# Patient Record
Sex: FEMALE | Race: WHITE | NOT HISPANIC OR LATINO | Employment: OTHER | URBAN - METROPOLITAN AREA
[De-identification: names, ages, dates, MRNs, and addresses within clinical notes are randomized per-mention and may not be internally consistent; named-entity substitution may affect disease eponyms.]

---

## 2017-03-23 ENCOUNTER — ALLSCRIPTS OFFICE VISIT (OUTPATIENT)
Dept: OTHER | Facility: OTHER | Age: 82
End: 2017-03-23

## 2017-04-04 ENCOUNTER — GENERIC CONVERSION - ENCOUNTER (OUTPATIENT)
Dept: OTHER | Facility: OTHER | Age: 82
End: 2017-04-04

## 2017-05-18 ENCOUNTER — ALLSCRIPTS OFFICE VISIT (OUTPATIENT)
Dept: OTHER | Facility: OTHER | Age: 82
End: 2017-05-18

## 2017-05-18 DIAGNOSIS — I50.32 CHRONIC DIASTOLIC HEART FAILURE (HCC): ICD-10-CM

## 2017-06-26 ENCOUNTER — ALLSCRIPTS OFFICE VISIT (OUTPATIENT)
Dept: OTHER | Facility: OTHER | Age: 82
End: 2017-06-26

## 2017-10-17 ENCOUNTER — GENERIC CONVERSION - ENCOUNTER (OUTPATIENT)
Dept: OTHER | Facility: OTHER | Age: 82
End: 2017-10-17

## 2017-11-13 ENCOUNTER — ALLSCRIPTS OFFICE VISIT (OUTPATIENT)
Dept: OTHER | Facility: OTHER | Age: 82
End: 2017-11-13

## 2017-11-15 NOTE — PROGRESS NOTES
Assessment  Assessed    1  Permanent atrial fibrillation (427 31) (I48 2)   2  Pacemaker Permanent Placement Dual-Chamber    Plan  Pacemaker Permanent Placement Dual-Chamber    · Follow-up visit in 6 months Evaluation and Treatment  Follow-up  Status: Complete Done: [id number]   Ordered; For: Pacemaker Permanent Placement Dual-Chamber; Ordered By: Igor Aleman Performed:  Order Comments: WAIT LIST Due: [id number]; Last Updated By: Faye Rosa; 11/13/2017 10:40:26 AM  Permanent atrial fibrillation    · EKG/ECG- POC; Status:Complete;   Done: 37XVQ5389 10:23AM   Perform: In Office; Last Updated Pepe Zarate; 11/13/2017 10:23:42 AM;Ordered;atrial fibrillation; Ordered By:Marielle Garcia; Discussion/Summary  Cardiology Discussion Summary Free Text Note Form [facility]:   1  Paroxysmal Atrial FIbrillation: She is in controlled atrial fibrillation  Tolerating anticoagulation  Check labs  s/p PPM: Stable  Continue regular interrogations  Edema/Chronic Diastolic CHF: On lasix 24JQ Daily  Her cr  is stable 1 8 - 2  Volume status is stable  Counseling Documentation With Imm: The patient was counseled regarding diagnostic results,-- instructions for management,-- risk factor reductions,-- impressions  total time of encounter was 25 minutes-- and-- 15 minutes was spent counseling  Chief Complaint  Chief Complaint Free Text Note Form: Six month follow up with EKG  Patient states she had sharp pain in side of chest under left breast a few days ago and it lasting a approx  3-4 mins  No symptoms since  History of Present Illness  Cardiology HPI Free Text Note Form [facility]: Followup for AF  episode of left sided chest discomfort that lasted for a few minutes  No recurrences  Otherwise doing okay  Atrial Fibrillation (Follow-Up): The patient presents with permanent atrial fibrillation  The treatment strategy for this patient is rate control  She states her atrial fibrillation has been stable since the last visit  Symptoms: denies palpitations,-- denies chest pain,-- stable exercise intolerance,-- stable dyspnea on exertion-- and-- denies dizziness  Associated symptoms include no syncope,-- no focal neurologic deficit,-- no tendency for easy bleeding-- and-- no tendency for easy bruising  Monitoring: The patient has regular PT/INR checks  Medications: the patient is adherent with her medication regimen  -- She denies medication side effects  Review of Systems  Cardiology Female ROS:    Cardiac: rhythm problems  Skin: No complaints of nonhealing sores or skin rash  Genitourinary: No complaints of recurrent urinary tract infections, frequent urination at night, difficult urination, blood in urine, kidney stones, loss of bladder control, kidney problems, denies any birth control or hormone replacement, is not post menopausal, not currently pregnant  Psychological: No complaints of feeling depressed, anxiety, panic attacks, or difficulty concentrating  General: No complaints of trouble sleeping, lack of energy, fatigue, appetite changes, weight changes, fever, frequent infections, or night sweats  Respiratory: shortness of breath  HEENT: No complaints of serious problems, hearing problems, nose problems, throat problems, or snoring  Gastrointestinal: No complaints of liver problems, nausea, vomiting, heartburn, constipation, bloody stools, diarrhea, problems swallowing, adbominal pain, or rectal bleeding  Hematologic: No complaints of bleeding disorders, anemia, blood clots, or excessive brusing  Neurological: No complaints of numbness, tingling, dizziness, weakness, seizures, headaches, syncope or fainting, AM fatigue, daytime sleepiness, no witnessed apnea episodes  Musculoskeletal: No complaints of arthritis, back pain, or painfull swelling  ROS Reviewed:   ROS reviewed  Active Problems  Problems    1  Abdominal pain (789 00) (R10 9)   2  Acute myocardial infarction (410 90) (I21 9)   3   Anemia (285 9) (D64 9)   4  Atypical chest pain (786 59) (R07 89)   5  AV block (426 10) (I44 30)   6  Chronic diastolic congestive heart failure (428 32,428 0) (I50 32)   7  Coronary artery disease (414 00) (I25 10)   8  Gait disturbance (781 2) (R26 9)   9  Hyperlipidemia (272 4) (E78 5)   10  Hypertension (401 9) (I10)   11  Macrocytosis (289 89) (D75 89)   12  Muscle weakness (728 87) (M62 81)   13  Osteoarthritis (715 90) (M19 90)   14  Osteoporosis (733 00) (M81 0)   15  Pacemaker Permanent Placement Dual-Chamber   16  Pericardial effusion (423 9) (I31 3)   17  Permanent atrial fibrillation (427 31) (I48 2)   18  Supraventricular tachycardia (427 89) (I47 1)   19  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)   20  Vitamin B12 deficiency (266 2) (E53 8)    Past Medical History  Problems    1  History of Choledocholithiasis (574 50) (K80 50)   2  History of Cholelithiasis with acute cholecystitis (574 00) (K80 00)   3  History of Contusion of skin with intact surface (924 9) (T14 8XXA)   4  History of nausea and vomiting (V12 79) (Z87 898)   5  History of vaginitis (V13 29) (Z87 42)   6  History of Open Wound Of The Lower Extremity (894 0)   7  History of Skin Cancer (V10 83)   8  History of Subacromial or subdeltoid bursitis, unspecified laterality   9  History of Umbilical hernia (910 4) (K42 9)  Active Problems And Past Medical History Reviewed: The active problems and past medical history were reviewed and updated today  Surgical History  Problems    1  History of Cataract Surgery   2  History of Cholecystectomy Laparoscopic   3  History of Creation Of Pericardial Window   4  History of Mohs Micrographic Surgery Nose   5  Pacemaker Permanent Placement Dual-Chamber   6  History of Pacemaker Placement   7  History of Umbilical Hernia Repair  Surgical History Reviewed: The surgical history was reviewed and updated today  Family History  Family History Reviewed: The family history was reviewed and updated today  Social History  Problems    · Being A Social Drinker   · Exercising Regularly   · Former smoker (O87 32) (U40 966)   · Marital History -    · Occupation: Retired  Social History Reviewed: The social history was reviewed and updated today  Current Meds   1  Acetaminophen 325 MG Oral Tablet; Therapy: (Recorded:18May2017) to Recorded   2  Artificial Tears 0 1-0 3 % Ophthalmic Solution; Therapy: (Recorded:18May2017) to Recorded   3  Calazime Skin Protectant External Paste; Therapy: (Recorded:18May2017) to Recorded   4  Calcium TABS; Therapy: (Recorded:02Jan2014) to Recorded   5  CeraVe CREA; Therapy: (Recorded:18May2017) to Recorded   6  Furosemide 80 MG Oral Tablet; TAKE 1 TABLET DAILY AS DIRECTED; Therapy: (Recorded:27May2014) to  Requested for: 49CMG1670 Recorded   7  Loperamide HCl - 2 MG Oral Capsule; Therapy: (Recorded:18May2017) to Recorded   8  Metoprolol Succinate ER 50 MG Oral Tablet Extended Release 24 Hour; TAKE 1 TABLET DAILY; Therapy: 80CMN2064 to (Kimberly Pemberton)  Requested for: 94UOZ4241 Recorded   9  Pantoprazole Sodium 40 MG Oral Tablet Delayed Release; TAKE 1 TABLET DAILY; Therapy: 03UUJ3513 to (Evaluate:28Mar2016) Recorded   10  Potassium Chloride Jade ER 20 MEQ Oral Tablet Extended Release; TAKE 1 TABLET  EVERY 12 HOURS; Therapy: 31DFK1241 to (Evaluate:23Dec2014)  Requested for: 66TYL9520 Recorded   11  Saline Nasal Mist SOLN;  Therapy: (Recorded:18May2017) to Recorded   12  Triple Antibiotic OINT; Therapy: (Recorded:18May2017) to Recorded   13  Vitamin B-12 500 MCG Oral Tablet; give 1 tab every 2 days; Therapy: 57VPO6342 to Recorded   14  Vitamin D TABS; Therapy: (Recorded:02Jan2014) to Recorded   15  Warfarin Sodium 2 5 MG Oral Tablet; TAKE AS DIRECTED; Therapy: 27EQY8446 to (Evaluate:64Lfg4902) Recorded  Medication List Reviewed: The medication list was reviewed and updated today  Allergies  Medication    1  Hycodan SYRP   2   Demerol SOLN    Vitals  Vital Signs Recorded: 82AHD8562 10:12AM   Heart Rate 59   Systolic 992   Diastolic 52   Height 5 ft 4 in   Weight 134 lb    BMI Calculated 23   BSA Calculated 1 65       Physical Exam   Constitutional  General appearance: No acute distress, well appearing and well nourished  Eyes  Conjunctiva and Sclera examination: Conjunctiva pink, sclera anicteric  Ears, Nose, Mouth, and Throat - Oropharynx: Clear, nares are clear, mucous membranes are moist   Neck  Neck and thyroid: Normal, supple, trachea midline, no thyromegaly  Pulmonary  Respiratory effort: No increased work of breathing or signs of respiratory distress  Auscultation of lungs: Clear to auscultation, no rales, no rhonchi, no wheezing, good air movement  Cardiovascular  Auscultation of heart: Normal rate and rhythm, normal S1 and S2, no murmurs  Carotid pulses: Normal, 2+ bilaterally  Peripheral vascular exam: Normal pulses throughout, no tenderness, erythema or swelling  Pedal pulses: Normal, 2+ bilaterally  Examination of extremities for edema and/or varicosities: Normal    Abdomen  Abdomen: Non-tender and no distention  Liver and spleen: No hepatomegaly or splenomegaly  Musculoskeletal Gait and station: Normal gait  -- Digits and nails: Normal without clubbing or cyanosis  -- Inspection/palpation of joints, bones, and muscles: Normal, ROM normal    Skin - Skin and subcutaneous tissue: Normal without rashes or lesions  Skin is warm and well perfused, normal turgor  Neurologic - Cranial nerves: II - XII intact  -- Speech: Normal    Psychiatric - Orientation to person, place, and time: Normal -- Mood and affect: Normal       Results/Data  ECG Report: Atrial Fibrillation with Ventricular Pacing        Future Appointments    Date/Time Provider Specialty Site   11/29/2017 10:00 AM Cardiology, Device Remote  [address]       Signatures   Electronically signed by : JONY Dyer ; Nov 14 2017  9:32AM EST (Author)

## 2017-11-29 ENCOUNTER — ALLSCRIPTS OFFICE VISIT (OUTPATIENT)
Dept: OTHER | Facility: OTHER | Age: 82
End: 2017-11-29

## 2018-01-02 ENCOUNTER — GENERIC CONVERSION - ENCOUNTER (OUTPATIENT)
Dept: OTHER | Facility: OTHER | Age: 83
End: 2018-01-02

## 2018-01-02 LAB — INR PPP: 2.7 (ref 0.86–1.16)

## 2018-01-11 NOTE — PROGRESS NOTES
REPORT NAME: Patient Visit Summary Report   VISIT DATE: 9/8/2016  VISIT TIME: 11:49 AM EDT  PATIENT NAME: Sulma Villalobos RECORD NUMBER: [id number]  SOCIAL SECURITY NUMBER:   YOB: 1923  AGE: 80  REFERRING PHYSICIAN: Jessica Hendrickson  SUPERVISING CLINICIAN: [address] CARE PROFESSIONAL: Ana Fabian  PATIENT HOME ADDRESS: [address] [place] [address] [address] [facility], [place] [address] PHONE: [phone]  DIAGNOSIS 1: Unspecified atrial fibrillation / I48 91  DIAGNOSIS 2:   DIAGNOSIS 3:   DIAGNOSIS 4:   INR RANGE: 2 - 3  INR GOAL: 2 5  TREATMENT START DATE:   TREATMENT END DATE:   NEXT VISIT: 9/22/2016  [facility]    VISIT RESULTS   ENCOUNTER NUMBER:   TEST LOCATION: Other - see notes  TEST TYPE: Outside Lab (Venipuncture)  VISIT TYPE: Phone Consult  CURRENT INR: 2 2 PROTIME: 24 5  SPECIMEN COL AND RPT DATE: 9/8/2016 11:49  AM EDT    VITAL SIGNS  PULSE:  B/P:  WEIGHT:  HEIGHT:  TEMP:     CURRENT ANTICOAGULANT DOSING SCHEDULE  DOSE SIZE: 5mg    ANTICOAGULANT TYPE: WARFARIN  DOSING REGIMEN  Sun       Mon       Tues      Wed       Thurs     Fri       Sat  Total/Wk  2 5       2 5       5         2 5       5         2 5       5         25    PATIENT MEDICATION INSTRUCTION: Yes  PATIENT NUTRITIONAL COUNSELING: No  PATIENT BRUISING INSTRUCTION: No      LAST EDUCATION DATE:       PREVIOUS VISIT INFORMATION  VISITDATE   INR Goal  INR   Sun     Mon     Tues    Wed     Thurs   Fri  Sat     Total/wk  9/8/2016    2 5       2 2   2 5     2 5     5       2 5     5       2 5  5       25  9/1/2016    2 5       2 2   2 5     2 5     5       2 5     5       2 5  5       25  8/24/2016   2 5       3 3   2 5     2 5     5       HOLD    5       2 5  5       22 5  8/10/2016   2 5       1 7   5       2 5     5       2 5     5       2 5  5       27 5    ADDITIONAL PREVIOUS VISIT INFORMATION  VISITDATE   PRIMARY RX               DOSE      CrCl  9/8/2016    WARFARIN                 5mg 9/1/2016    WARFARIN                 5mg                 8/24/2016   WARFARIN                 5mg                 8/10/2016   WARFARIN                 5mg                     OTHER CURRENT MEDICATIONS: WARFARIN      PROGRESS NOTES:     PATIENT INSTRUCTIONS: 9/8/16, tc to [address] [place] [address] [place] at [facility], verified  patient is taking warfarin as 5 mg t/th/sat, 2 5 mg all other days of the  week  will continue current dose  next pt/ inr due 2 weeks, 9/22/16  will fax to [facility]  [place]  TEST LOCATION: Other - see notes    Electronically signed by:  Queen Alirio Marie on 9/8/2016 at 11:49 AM EDT

## 2018-01-12 VITALS
SYSTOLIC BLOOD PRESSURE: 102 MMHG | BODY MASS INDEX: 22.88 KG/M2 | HEIGHT: 64 IN | WEIGHT: 134 LBS | DIASTOLIC BLOOD PRESSURE: 52 MMHG | HEART RATE: 59 BPM

## 2018-01-12 NOTE — PROGRESS NOTES
REPORT NAME: Progress Notes Report  VISIT DATE: 4/4/2017  VISIT TIME: 2:33 PM EDT  PATIENT NAME: Tonya Rolle   MEDICAL RECORD NUMBER: [id number]  YOB: 1923  AGE: 80  REFERRING PHYSICIAN: Millie Osorio  SUPERVISING CLINICIAN: [address] CARE PROVIDER: Elis Fabian  PATIENT HOME ADDRESS: [address] [place] [address] [address] [facility], [place], [address] [place] [address] PHONE: [phone]  SOCIAL SECURITY NUMBER:   DIAGNOSIS 1: Unspecified atrial fibrillation / I48 91  DIAGNOSIS 2:   INR RANGE: 2 - 3  INR GOAL: 2 5  TREATMENT START DATE:   TREATMENT END DATE:   NEXT VISIT: 4/11/2017  [facility]  VISIT RESULTS  ENCOUNTER NUMBER:   TEST LOCATION: Other - see notes  TEST TYPE: Outside Lab (Venipuncture)  VISIT TYPE: Phone Consult  CURRENT INR: 1 9 PROTIME: 20 2  SPECIMEN COL AND RPT DATE: 4/4/2017 2:33 PM  EDT  VITAL SIGNS  PULSE:  BP: / WEIGHT:  HEIGHT:  TEMP:   CURRENT ANTICOAGULANT DOSING SCHEDULE  DOSE SIZE: 5mg    ANTICOAGULANT TYPE: WARFARIN  DOSING REGIMEN  Sun       Mon       Tues      Wed       Thurs     Fri       Sat  Total/Wk  2 5       2 5       5         2 5       5         2 5       5         25  PATIENT MEDICATION INSTRUCTION: Yes  PATIENT NUTRITIONAL COUNSELING: No  PATIENT BRUISING INSTRUCTION: No  LAST EDUCATION DATE:   PREVIOUS VISIT INFORMATION  VISITDATE  INRGoal INR   Sun    Mon    Tues   Wed    Thurs  Fri    Sat  Total/wk  4/4/2017    2 5     1 9   2 5    2 5    5      2 5    5      2 5    5  25  3/31/2017   2 5     1 9   2 5    2 5    0      0      0      2 5    5  12 5  3/27/2017   2 5     3     2 5    1 25   2 5    2 5    2 5    0      0  11 25  3/22/2017   2 5     3 2   2 5    0      0      HOLD   2 5    2 5    2 5  10  ADDITIONAL PREVIOUS VISIT INFORMATION  VISITDATE   PRIMARY RX               DOSE      CrCl  4/4/2017    WARFARIN                 5mg  3/31/2017   WARFARIN                 5mg  3/27/2017   WARFARIN                 5mg  3/22/2017   WARFARIN 5mg  OTHER CURRENT MEDICATIONS:  WARFARIN  PROGRESS NOTES:   PATIENT INSTRUCTIONS: 4/4/17, tc to [facility], resume previous dose of  warfarin, 5 mg t/th/sat, 2 5 mg all other days of the week  next pt/ inr due in one week, 4/11  [place]  TEST LOCATION: Other - see notes, , ,   INBOUND LAB DATA:  Lab       Lab Value Col Date                 Rpt Date                 Lab  Reference Range  Electronically signed by:  Malgorzata Marie on 4/4/2017 2:33 PM EDT

## 2018-01-13 VITALS
RESPIRATION RATE: 16 BRPM | WEIGHT: 136 LBS | DIASTOLIC BLOOD PRESSURE: 60 MMHG | HEART RATE: 66 BPM | SYSTOLIC BLOOD PRESSURE: 112 MMHG | BODY MASS INDEX: 23.22 KG/M2 | HEIGHT: 64 IN

## 2018-01-13 NOTE — PROGRESS NOTES
REPORT NAME: Progress Notes Report  VISIT DATE: 10/17/2017  VISIT TIME: 11:37 AM EDT  PATIENT NAME: Keisha Burton   MEDICAL RECORD NUMBER: [id number]  YOB: 1923  AGE: 80  REFERRING PHYSICIAN: Naman Bee  SUPERVISING CLINICIAN: [address] CARE PROVIDER: Diane Fabian  PATIENT HOME ADDRESS: [address] [place] [address] [address] [facility], [address] [place] [address]  PATIENT HOME PHONE: [phone]  SOCIAL SECURITY NUMBER:   DIAGNOSIS 1: Unspecified atrial fibrillation / I48 91  DIAGNOSIS 2:   INR RANGE: 2 - 3  INR GOAL: 2 5  TREATMENT START DATE:   TREATMENT END DATE:   NEXT VISIT: 10/31/2017  [facility]  VISIT RESULTS  ENCOUNTER NUMBER:   TEST LOCATION: Other - see notes  TEST TYPE: Outside Lab (Venipuncture)  VISIT TYPE: Phone Consult  CURRENT INR: 2 PROTIME: 21 4  SPECIMEN COL AND RPT DATE: 10/17/2017 11:37  AM EDT  VITAL SIGNS  PULSE:  BP: / WEIGHT:  HEIGHT:  TEMP:   CURRENT ANTICOAGULANT DOSING SCHEDULE  DOSE SIZE: 5mg    ANTICOAGULANT TYPE: WARFARIN  DOSING REGIMEN  Sun       Mon       Tues      Wed       Thurs     Fri       Sat  Total/Wk  5         2 5       5         2 5       5         2 5       5         27 5  PATIENT MEDICATION INSTRUCTION: Yes  PATIENT NUTRITIONAL COUNSELING: No  PATIENT BRUISING INSTRUCTION: No  LAST EDUCATION DATE:   PREVIOUS VISIT INFORMATION  VISITDATE  INRGoal INR   Sun    Mon Tues Wed Thurs  Fri    Sat  Total/wk  10/17/2017  2 5     2     5      2 5    5      2 5    5      2 5    5  27 5  10/3/2017   2 5     1 8   5      2 5    5      2 5    5      2 5    5  27 5  9/5/2017    2 5     2     2 5    2 5    5      2 5    5      2 5    5  25  8/8/2017    2 5     2 1   2 5    2 5    5      2 5    5      2 5    5  25  ADDITIONAL PREVIOUS VISIT INFORMATION  VISITDATE   PRIMARY RX               DOSE      CrCl  10/17/2017  WARFARIN                 5mg  10/3/2017   WARFARIN                 5mg  9/5/2017    WARFARIN                 5mg  8/8/2017    WARFARIN 5mg  OTHER CURRENT MEDICATIONS:  WARFARIN  PROGRESS NOTES:   PATIENT INSTRUCTIONS: 10/17/17, continue current dose, of warfarin 2 5 mg  m/w/f, 5 mg other days of the week  next pt/ inr due 2 weeks, 10/31  will fax to [facility]  ---- Additional Instructions entered on 10/26/2017 10:49 AM EDT by Iris Marie :  11/26/17, TC FROM [facility], PT PASSED  BLOOD INURINE YESTAREDAY AND TODAY  COMPLETED PREDNISONE TAPERING DOSE  YESTERDAY  STAT INR TODAY  TEST LOCATION: Other - see notes, , ,   INBOUND LAB DATA:  Lab       Lab Value Col Date                 Rpt Date                 Lab  Reference Range  Electronically signed by:  Rohan Marie on 10/26/2017 10:49 AM EDT

## 2018-01-16 NOTE — CONSULTS
Chief Complaint  Six month follow up with EKG  Patient states she had sharp pain in side of chest under left breast "a few days ago" and it lasting a approx  3-4 mins  No symptoms since  History of Present Illness  Followup for AF    One episode of left sided chest discomfort that lasted for a few minutes  No recurrences  Otherwise doing okay  The patient presents with permanent atrial fibrillation  The treatment strategy for this patient is rate control  She states her atrial fibrillation has been stable since the last visit  Symptoms: denies palpitations, denies chest pain, stable exercise intolerance, stable dyspnea on exertion and denies dizziness  Associated symptoms include no syncope, no focal neurologic deficit, no tendency for easy bleeding and no tendency for easy bruising  Monitoring: The patient has regular PT/INR checks  Medications: the patient is adherent with her medication regimen  She denies medication side effects  Review of Systems      Cardiac: rhythm problems  Skin: No complaints of nonhealing sores or skin rash  Genitourinary: No complaints of recurrent urinary tract infections, frequent urination at night, difficult urination, blood in urine, kidney stones, loss of bladder control, kidney problems, denies any birth control or hormone replacement, is not post menopausal, not currently pregnant  Psychological: No complaints of feeling depressed, anxiety, panic attacks, or difficulty concentrating  General: No complaints of trouble sleeping, lack of energy, fatigue, appetite changes, weight changes, fever, frequent infections, or night sweats  Respiratory: shortness of breath  HEENT: No complaints of serious problems, hearing problems, nose problems, throat problems, or snoring  Gastrointestinal: No complaints of liver problems, nausea, vomiting, heartburn, constipation, bloody stools, diarrhea, problems swallowing, adbominal pain, or rectal bleeding  Hematologic: No complaints of bleeding disorders, anemia, blood clots, or excessive brusing  Neurological: No complaints of numbness, tingling, dizziness, weakness, seizures, headaches, syncope or fainting, AM fatigue, daytime sleepiness, no witnessed apnea episodes  Musculoskeletal: No complaints of arthritis, back pain, or painfull swelling  ROS reviewed  Active Problems    1  Abdominal pain (789 00) (R10 9)   2  Acute myocardial infarction (410 90) (I21 9)   3  Anemia (285 9) (D64 9)   4  Atypical chest pain (786 59) (R07 89)   5  AV block (426 10) (I44 30)   6  Chronic diastolic congestive heart failure (428 32,428 0) (I50 32)   7  Coronary artery disease (414 00) (I25 10)   8  Gait disturbance (781 2) (R26 9)   9  Hyperlipidemia (272 4) (E78 5)   10  Hypertension (401 9) (I10)   11  Macrocytosis (289 89) (D75 89)   12  Muscle weakness (728 87) (M62 81)   13  Osteoarthritis (715 90) (M19 90)   14  Osteoporosis (733 00) (M81 0)   15  Pacemaker Permanent Placement Dual-Chamber   16  Pericardial effusion (423 9) (I31 3)   17  Permanent atrial fibrillation (427 31) (I48 2)   18  Supraventricular tachycardia (427 89) (I47 1)   19  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)   20  Vitamin B12 deficiency (266 2) (E53 8)    Past Medical History    · History of Choledocholithiasis (574 50) (K80 50)   · History of Cholelithiasis with acute cholecystitis (574 00) (K80 00)   · History of Contusion of skin with intact surface (924 9) (T14 8XXA)   · History of nausea and vomiting (V12 79) (X52 741)   · History of vaginitis (V13 29) (Z87 42)   · History of Open Wound Of The Lower Extremity (894 0)   · History of Skin Cancer (V10 83)   · History of Subacromial or subdeltoid bursitis, unspecified laterality   · History of Umbilical hernia (013 2) (K42 9)    The active problems and past medical history were reviewed and updated today        Surgical History    · History of Cataract Surgery   · History of Cholecystectomy Laparoscopic   · History of Creation Of Pericardial Window   · History of Mohs Micrographic Surgery Nose   · Pacemaker Permanent Placement Dual-Chamber   · History of Pacemaker Placement   · History of Umbilical Hernia Repair    The surgical history was reviewed and updated today  Family History    The family history was reviewed and updated today  Social History    · Being A Social Drinker   · Exercising Regularly   · Former smoker (F09 78) (Y45 383)   · Marital History -    · Occupation: Retired  The social history was reviewed and updated today  Current Meds   1  Acetaminophen 325 MG Oral Tablet; Therapy: (Recorded:18May2017) to Recorded   2  Artificial Tears 0 1-0 3 % Ophthalmic Solution; Therapy: (Recorded:18May2017) to Recorded   3  Calazime Skin Protectant External Paste; Therapy: (Recorded:18May2017) to Recorded   4  Calcium TABS; Therapy: (Recorded:02Jan2014) to Recorded   5  CeraVe CREA; Therapy: (Recorded:18May2017) to Recorded   6  Furosemide 80 MG Oral Tablet; TAKE 1 TABLET DAILY AS DIRECTED; Therapy: (Recorded:27May2014) to  Requested for: 40IAX6008 Recorded   7  Loperamide HCl - 2 MG Oral Capsule; Therapy: (Recorded:18May2017) to Recorded   8  Metoprolol Succinate ER 50 MG Oral Tablet Extended Release 24 Hour; TAKE 1 TABLET   DAILY; Therapy: 14VUG9063 to (Melisa Willams)  Requested for: 56YYW4731 Recorded   9  Pantoprazole Sodium 40 MG Oral Tablet Delayed Release; TAKE 1 TABLET DAILY; Therapy: 23UFP8058 to (Evaluate:28Mar2016) Recorded   10  Potassium Chloride Jade ER 20 MEQ Oral Tablet Extended Release; TAKE 1 TABLET    EVERY 12 HOURS; Therapy: 69BEM5711 to (Evaluate:27Cbk7932)  Requested for: 13JWG5406 Recorded   11  Saline Nasal Mist SOLN;    Therapy: (Recorded:18May2017) to Recorded   12  Triple Antibiotic OINT; Therapy: (Recorded:18May2017) to Recorded   13  Vitamin B-12 500 MCG Oral Tablet; give 1 tab every 2 days;     Therapy: 22BJD3685 to Recorded   14  Vitamin D TABS; Therapy: (Recorded:02Jan2014) to Recorded   15  Warfarin Sodium 2 5 MG Oral Tablet; TAKE AS DIRECTED; Therapy: 33HUZ4436 to (Evaluate:82Uoz0201) Recorded    The medication list was reviewed and updated today  Allergies    1  Hycodan SYRP   2  Demerol SOLN    Vitals   Recorded: 16THX1400 10:12AM   Heart Rate 59   Systolic 093   Diastolic 52   Height 5 ft 4 in   Weight 134 lb    BMI Calculated 23   BSA Calculated 1 65     Physical Exam    Constitutional   General appearance: No acute distress, well appearing and well nourished  Eyes   Conjunctiva and Sclera examination: Conjunctiva pink, sclera anicteric  Ears, Nose, Mouth, and Throat - Oropharynx: Clear, nares are clear, mucous membranes are moist    Neck   Neck and thyroid: Normal, supple, trachea midline, no thyromegaly  Pulmonary   Respiratory effort: No increased work of breathing or signs of respiratory distress  Auscultation of lungs: Clear to auscultation, no rales, no rhonchi, no wheezing, good air movement  Cardiovascular   Auscultation of heart: Normal rate and rhythm, normal S1 and S2, no murmurs  Carotid pulses: Normal, 2+ bilaterally  Peripheral vascular exam: Normal pulses throughout, no tenderness, erythema or swelling  Pedal pulses: Normal, 2+ bilaterally  Examination of extremities for edema and/or varicosities: Normal     Abdomen   Abdomen: Non-tender and no distention  Liver and spleen: No hepatomegaly or splenomegaly  Musculoskeletal Gait and station: Normal gait  Digits and nails: Normal without clubbing or cyanosis  Inspection/palpation of joints, bones, and muscles: Normal, ROM normal     Skin - Skin and subcutaneous tissue: Normal without rashes or lesions  Skin is warm and well perfused, normal turgor  Neurologic - Cranial nerves: II - XII intact   Speech: Normal     Psychiatric - Orientation to person, place, and time: Normal  Mood and affect: Normal  Results/Data  Atrial Fibrillation with Ventricular Pacing  Assessment    1  Permanent atrial fibrillation (427 31) (I48 2)   2  Pacemaker Permanent Placement Dual-Chamber    Plan  Pacemaker Permanent Placement Dual-Chamber    · Follow-up visit in 6 months Evaluation and Treatment  Follow-up  Status: Complete   Done: 28CPT7270   Ordered; For: Pacemaker Permanent Placement Dual-Chamber; Ordered By: Ronald Denise Performed:  Order Comments: WAIT LIST Due: 39TNH9246; Last Updated By: Kai Mercer; 11/13/2017 10:40:26 AM  Permanent atrial fibrillation    · EKG/ECG- POC; Status:Complete;   Done: 79ELH2038 10:23AM   Perform: In Office; Last Updated Karine Scott; 11/13/2017 10:23:42 AM;Ordered; For:Permanent atrial fibrillation; Ordered By:Nory Garcia; Discussion/Summary    1  Paroxysmal Atrial FIbrillation: She is in controlled atrial fibrillation  Tolerating anticoagulation  Check labs  2  s/p PPM: Stable  Continue regular interrogations  3  Edema/Chronic Diastolic CHF: On lasix 37DW Daily  Her cr  is stable 1 8 - 2  Volume status is stable  The patient was counseled regarding diagnostic results, instructions for management, risk factor reductions, impressions  total time of encounter was 25 minutes and 15 minutes was spent counseling        Future Appointments    Signatures   Electronically signed by : JONY Gupta ; Nov 14 2017  9:32AM EST                       (Author)

## 2018-01-19 ENCOUNTER — ANTICOAG VISIT (OUTPATIENT)
Dept: CARDIOLOGY CLINIC | Facility: CLINIC | Age: 83
End: 2018-01-19

## 2018-01-23 NOTE — PROGRESS NOTES
REPORT NAME: Progress Notes Report  VISIT DATE: 1/2/2018  VISIT TIME: 2:42 PM EST  PATIENT NAME: Aung Mckeon   MEDICAL RECORD NUMBER: [id number]  YOB: 1923  AGE: 80  REFERRING PHYSICIAN: Pineda Goddard  SUPERVISING CLINICIAN: [address] CARE PROVIDER: Sona Fabian  PATIENT HOME ADDRESS: [address] [place] [address] [address], [address] [place] [address], [address] [place] [address]  PATIENT HOME PHONE: [phone]  SOCIAL SECURITY NUMBER:   DIAGNOSIS 1: Unspecified atrial fibrillation / I48 91  DIAGNOSIS 2:   INR RANGE: 2 - 3  INR GOAL: 2 5  TREATMENT START DATE:   TREATMENT END DATE:   NEXT VISIT: 1/30/2018  [facility]  VISIT RESULTS  ENCOUNTER NUMBER:   TEST LOCATION: Other - see notes  TEST TYPE: Outside Lab (Venipuncture)  VISIT TYPE: Phone Consult  CURRENT INR: 2 7 PROTIME: 29 4  SPECIMEN COL AND RPT DATE: 1/2/2018 2:42 PM  EST  VITAL SIGNS  PULSE:  BP: / WEIGHT:  HEIGHT:  TEMP:   CURRENT ANTICOAGULANT DOSING SCHEDULE  DOSE SIZE: 5mg    ANTICOAGULANT TYPE: WARFARIN  DOSING REGIMEN  Sun       Mon Tues Wed Thurs Fri       Sat  Total/Wk  5         5         2 5       5         2 5       5         2 5       27 5  PATIENT MEDICATION INSTRUCTION: Yes  PATIENT NUTRITIONAL COUNSELING: No  PATIENT BRUISING INSTRUCTION: No  LAST EDUCATION DATE:   PREVIOUS VISIT INFORMATION  VISITDATE  INRGoal INR   Sun    Mon Tues Wed Thurs  Fri    Sat  Total/wk  1/2/2018    2 5     2 7   5      5      2 5    5      2 5    5      2 5  27 5  12/11/2017  2 5     2 4   5      5      2 5    5      2 5    5      2 5  27 5  11/27/2017  2 5     2     5      5      2 5    5      2 5    5      2 5  27 5  11/20/2017  2 5     1 9   5      5      2 5    5      2 5    5      2 5  27 5  ADDITIONAL PREVIOUS VISIT INFORMATION  VISITDATE   PRIMARY RX               DOSE      CrCl  1/2/2018    WARFARIN                 5mg  12/11/2017  WARFARIN                 5mg  11/27/2017  WARFARIN                 5mg  11/20/2017 WARFARIN                 5mg  OTHER CURRENT MEDICATIONS:  WARFARIN  PROGRESS NOTES:   PATIENT INSTRUCTIONS: 1/2/18,tc to [facility] Ramirez, cont current dose, next  inr due 4 weeks, 1/30/18  will fax to Deb Ramirez  hever  ---- Additional Instructions entered on 1/8/2018 9:10 AM EST by Senthil Marie :  1/8/18, tc from [facility], pt has cold symptoms, was  offered prednisone by her md , but she refused   will cont inr 1/30 as  planned  hever  TEST LOCATION: Other - see notes, , ,   INBOUND LAB DATA:  Lab       Lab Value Col Date                 Rpt Date                 Lab  Reference Range  Electronically signed by:  Senthil Marie on 1/8/2018 9:10 AM EST

## 2018-01-30 ENCOUNTER — ANTICOAG VISIT (OUTPATIENT)
Dept: CARDIOLOGY CLINIC | Facility: CLINIC | Age: 83
End: 2018-01-30

## 2018-01-30 LAB — INR PPP: 2.5 (ref 0.86–1.16)

## 2018-02-27 ENCOUNTER — ANTICOAG VISIT (OUTPATIENT)
Dept: CARDIOLOGY CLINIC | Facility: CLINIC | Age: 83
End: 2018-02-27

## 2018-02-27 ENCOUNTER — TELEPHONE (OUTPATIENT)
Dept: CARDIOLOGY CLINIC | Facility: CLINIC | Age: 83
End: 2018-02-27

## 2018-02-27 LAB — INR PPP: 7.4 (ref 0.86–1.16)

## 2018-02-27 NOTE — TELEPHONE ENCOUNTER
Phone call from raad at [facility] living  [phone]  mavis nurse  Reporting inr today, 7 4  Patient has been taking mucinex for cols symptoms  Denies any bleeding  Will hold warfarin x 2 days, monitor for any bleeding  Call office or go to er if bleeding occurs  Eat foods high in vitamin k today  inr in 2 days, 3/1  Please advise if you agree, or if you would want any other changes

## 2018-03-01 ENCOUNTER — ANTICOAG VISIT (OUTPATIENT)
Dept: CARDIOLOGY CLINIC | Facility: CLINIC | Age: 83
End: 2018-03-01

## 2018-03-01 ENCOUNTER — TELEPHONE (OUTPATIENT)
Dept: CARDIOLOGY CLINIC | Facility: CLINIC | Age: 83
End: 2018-03-01

## 2018-03-01 LAB — INR PPP: 4.9 (ref 0.86–1.16)

## 2018-03-01 NOTE — TELEPHONE ENCOUNTER
Received pt/ inr results from 3/1/18, inr improved, 4 9 today  Called to [address] raad [address]  She reports patient was started on ceftin for URI today  Denies any bleeding  Nurse reports patient did vomit today  instructed nurse to hold warfarin thurs/friday/saturday and Sunday  inr due monday 3/5/18  instructed to monitor for any bleeding  Go to er for evaluation if bleeding  She will notify pcp or resident md concerning vomiting  Nurse reports she was seen by doctor yesterday  Will review with dr Tavia Galan  Do you want any changes to above? Please advise

## 2018-03-05 ENCOUNTER — ANTICOAG VISIT (OUTPATIENT)
Dept: CARDIOLOGY CLINIC | Facility: CLINIC | Age: 83
End: 2018-03-05

## 2018-03-05 LAB — INR PPP: 1.8 (ref 0.86–1.16)

## 2018-03-07 NOTE — PROGRESS NOTES
"  Discussion/Summary  Normal device function     Episode of NSVT  episode of RVR  Results/Data  Cardiac Device Remote [id number] 06:21AM Juan Manuel [employer]     Test Name Result Flag Reference   MISCELLANEOUS COMMENT (Report)     LATITUDE TRANSMISSION: BATTERY VOLTAGE ADEQUATE (4 YR)   98% (>40%/AVB)  ALL LEAD PARAMETERS WITHIN NORMAL LIMITS  3 NEW NonSustV EVENTS WITH 2 AVAILABLE EGMS SHOWING PROBABLE NSVT VS RVR (14 @ 183 BPM, 6 @ 203 BPM)  PT IS ON METOPROLOL AND WARFARIN  EF 60% (ECHO 06/2014)  TASKED TO DR WERNER FOR REVIEW  NORMAL DEVICE FUNCTION  RG   Cardiac Electrophysiology Report      [id number]  pdf   DEVICE TYPE Pacemaker       Cardiac Electrophysiology Report [id number] 06:21AM Juan Manuel Perdomo     Test Name Result Flag Reference   Cardiac Electrophysiology Report      [id number] pdf     Signatures   Electronically signed by : Nicanor Catalan, ; Nov 29 2017 12:05PM EST                       (Author)    Electronically signed by : JONY Martinez ; Dec  2 2017  9:06PM EST                       (Author)    "

## 2018-03-07 NOTE — PROGRESS NOTES
"  Discussion/Summary  Normal device function      Results/Data  Cardiac Device Remote [id number] 05:20AM Amberly Bull     Test Name Result Flag Reference   MISCELLANEOUS COMMENT (Report)     LATITUDE TRANSMISSION: BATTERY VOLTAGE ADEQUATE (4 5 YRS)  -92% (>40% VVIR @ 60 PPM)  ALL AVAILABLE LEAD PARAMETERS WITHIN NORMAL LIMITS  1 NSVT EPISODE FOR 8 BEATS, AVG CL~270MS  EF-60% (ECHO 6/16/14)  PT ON WARFARIN & METOPROLOL  NORMAL DEVICE FUNCTION  GV   Cardiac Electrophysiology Report      [id number]  pdf   DEVICE TYPE Pacemaker       Cardiac Electrophysiology Report [id number] 05:20AM Amberly Bull     Test Name Result Flag Reference   Cardiac Electrophysiology Report      [id number]  pdf     Signatures   Electronically signed by : Benjamin Salinas RN; Mar 29 2017  2:20PM EST                       (Author)    Electronically signed by : Nina Sanders DO;  Apr 1 2017  5:18PM EST                       (Author)    "

## 2018-03-07 NOTE — PROGRESS NOTES
"  Discussion/Summary  Normal device function     Low R waves, chronic  brief NSVT  Results/Data  Cardiac Device Remote [id number] 05:22AM Louisa Villar     Test Name Result Flag Reference   MISCELLANEOUS COMMENT (Report)     LATITUDE TRANSMISSION: [id number] VOLTAGE ADEQUATE (4 5 YRS)  -92% (>40% VVIR @ 60 PPM)  CHRONIC LOW R WV SENSING AMPLITUDE BUT STABLE  ALL OTHER AVAILABLE LEAD PARAMETERS APPEAR WITHIN NORMAL LIMITS & STABLE  1 NEW NSVT EPISODE FOR 6 BEATS, AVG CL~350 MS  EF-60% (ECHO 6/16/14)  PT ON WARFARIN & METOPROLOL  APPROPRIATELY FUNCTIONING ICD  EB   Cardiac Electrophysiology Report      [id number] [facility] 6 21 17 pdf   DEVICE TYPE Pacemaker       Cardiac Electrophysiology Report [id number] 05:22AM Louisa Villar     Test Name Result Flag Reference   Cardiac Electrophysiology Report      [id number]  pdf     Signatures   Electronically signed by : Pernell Ackerman, ; Jun 27 2017 12:29PM EST                       (Author)    Electronically signed by : JONY Watkins ; Jul 1 2017  5:57AM EST                       (Author)    "

## 2018-03-07 NOTE — PROGRESS NOTES
"  Discussion/Summary  Normal device function     Permanent A fib  on coumadin  Results/Data  Cardiac Device In Clinic [id number] 03:49PM Love Paiz     Test Name Result Flag Reference   MISCELLANEOUS COMMENT      DEVICE INTERROGATED IN THE [facility] OFFICE: BATTERY VOLTAGE ADEQUATE (3 YRS)  AP <1%  98% (>40% - DDDR/60)  MULTIPLE AHR EPISODES (AFIB BURDEN 100% X > 1 YR) - PROG VVIR  NO VHR EPISODES  PACEMAKER FUNCTIONING APPROPRIATELY  CP   Cardiac Electrophysiology Report      [id number]  pdf   DEVICE TYPE Pacemaker       Cardiac Electrophysiology Report [id number] 03:49PM Love Paiz     Test Name Result Flag Reference   Cardiac Electrophysiology Report      [id number]  pdf     Signatures   Electronically signed by : Ginger Fletcher, ; Dec 22 2016  3:36PM EST                       (Author)    Electronically signed by : Severiano Santee, M D ; Dec 25 2016  9:46AM EST                       (Author)    "

## 2018-03-07 NOTE — PROGRESS NOTES
"  Discussion/Summary  Normal device function      Results/Data  Cardiac Device Remote [id number] 05:21AM Erin Santana     Test Name Result Flag Reference   MISCELLANEOUS COMMENT (Report)     LATITUDE TRANSMISSION: BATTERY VOLTAGE ADEQUATE (3 YRS); AP = 0%,  = 98% (>40%/ RYTHMIQ OFF - DDDR 60); 100% AT/AF BURDEN; (1) NSVT EPISODES WITH 5 BEAT /AVG  MS DURATION; EF = 60% (2014 ECHO); PT TAKES WARFARIN, METOPROLOL; ALL AVAILABLE LEAD PARAMETERS APPEARS WITHIN NORMAL LIMITS/STABLE; NORMAL DEVICE FUNCTION  eb   Cardiac Electrophysiology Report      [id number]  latitude  9 26 16 pdf   DEVICE TYPE Pacemaker       Cardiac Electrophysiology Report [id number] 05:21AM Erin Santana     Test Name Result Flag Reference   Cardiac Electrophysiology Report      [id number]  pdf     Signatures   Electronically signed by : Rupa Herron, ; Sep 26 2016 10:00AM EST                       (Author)    Electronically signed by : Anitha Colunga DO; Sep 26 2016 12:54PM EST                       (Author)    "

## 2018-03-07 NOTE — PROGRESS NOTES
"  Discussion/Summary  Normal device function      Results/Data  Results   Cardiac Device Remote [id number] 05:21AM Razia Ege     Test Name Result Flag Reference   MISCELLANEOUS COMMENT (Report)     LATITUDE TRANSMISSION: BATTERY VOLTAGE ADEQUATE  ( 3 5 YRS)  AP 0%  97%  ALL AVAILABLE LEAD PARAMETERS WITHIN NORMAL LIMITS  1 VHR EPISODE DETECTED 4 BEATS @ 313ms  ATR EPISODES DETECTED  PATIENT IS CURRENTLY IN AFIB  PATIENT IS ON METOPROLOL AND WARFARIN  NORMAL DEVICE FUNCTION  ---LADD   Cardiac Electrophysiology Report      [id number]  pdf   DEVICE TYPE Pacemaker       Cardiac Electrophysiology Report [id number] 05:21AM Razia Ege     Test Name Result Flag Reference   Cardiac Electrophysiology Report      [id number]  pdf     Signatures   Electronically signed by : Maddy Venegas, ; Jun 23 2016  8:49AM EST                       (Author)    Electronically signed by : Harris Jim DO; Jun 23 2016  6:22PM EST                       (Author)    "

## 2018-03-07 NOTE — PROGRESS NOTES
"  Discussion/Summary  Normal device function      Results/Data  Results   Cardiac Device Remote [id number] 05:21AM Galilea Medrano     Test Name Result Flag Reference   MISCELLANEOUS COMMENT      LATITUDE TRANSMISSION: BATTERY STATUS "OK"   96% AP 0%  2 NSVT NOTED, LONGEST 9 BEATS ~ 3 SECS  % OF TIME IN AT/AF  PT ON WARFARIN & METOPROLOL  EF 60% (2014)  ALL AVAILABLE LEAD PARAMETERS WITHIN NORMAL LIMITS  NORMAL DEVICE FUNCTION  NC   Cardiac Electrophysiology Report      [id number]  pdf   DEVICE TYPE Pacemaker       Cardiac Electrophysiology Report [id number] 05:21AM Galilea Medrano     Test Name Result Flag Reference   Cardiac Electrophysiology Report      ey[id number]  pdf     Signatures   Electronically signed by : Casey Gamboa, ; Mar 21 2016  1:05PM EST                       (Author)    Electronically signed by : Leon Cm DO; Mar 22 2016  9:49AM EST                       (Author)    "

## 2018-03-08 ENCOUNTER — ANTICOAG VISIT (OUTPATIENT)
Dept: CARDIOLOGY CLINIC | Facility: CLINIC | Age: 83
End: 2018-03-08

## 2018-03-08 LAB — INR PPP: 1.4 (ref 0.86–1.16)

## 2018-03-12 ENCOUNTER — ANTICOAG VISIT (OUTPATIENT)
Dept: CARDIOLOGY CLINIC | Facility: CLINIC | Age: 83
End: 2018-03-12

## 2018-03-12 LAB — INR PPP: 2.1 (ref 0.86–1.16)

## 2018-03-14 ENCOUNTER — CLINICAL SUPPORT (OUTPATIENT)
Dept: CARDIOLOGY CLINIC | Facility: CLINIC | Age: 83
End: 2018-03-14
Payer: MEDICARE

## 2018-03-14 DIAGNOSIS — I48.21 PERMANENT ATRIAL FIBRILLATION (HCC): ICD-10-CM

## 2018-03-14 DIAGNOSIS — Z95.0 CARDIAC PACEMAKER: ICD-10-CM

## 2018-03-14 DIAGNOSIS — I44.2 AV BLOCK, COMPLETE (HCC): Primary | ICD-10-CM

## 2018-03-14 PROCEDURE — 93279 PRGRMG DEV EVAL PM/LDLS PM: CPT | Performed by: INTERNAL MEDICINE

## 2018-03-14 NOTE — PROGRESS NOTES
PPM INTERROGATED IN THE [place] OFFICE: BATTERY VOLTAGE ADEQUATE (3 5 YRS)  -92% (>40% AVB/VVIR @ 60 PPM)  ALL LEAD PARAMETERS TEST WITHIN NORMAL LIMITS & STABLE  1 NEW VHR EPISODE FOR PROBABLE NSVT vs RVR @ 4 BEATS, AVG CL~350MS  EF-60% (ECHO 6/16/14)  CHRONIC AF - PT ON WARFARIN, METOPROLOL  NO PROGRAMMING CHANGES MADE TO DEVICE PARAMETERS   NORMAL DEVICE FUNCTION   eb

## 2018-03-16 ENCOUNTER — ANTICOAG VISIT (OUTPATIENT)
Dept: CARDIOLOGY CLINIC | Facility: CLINIC | Age: 83
End: 2018-03-16

## 2018-03-16 LAB — INR PPP: 3.4 (ref 0.86–1.16)

## 2018-03-21 LAB — INR PPP: 3.1 (ref 0.86–1.16)

## 2018-03-22 ENCOUNTER — ANTICOAG VISIT (OUTPATIENT)
Dept: CARDIOLOGY CLINIC | Facility: CLINIC | Age: 83
End: 2018-03-22

## 2018-03-28 LAB — INR PPP: 2.5 (ref 0.86–1.16)

## 2018-03-29 ENCOUNTER — ANTICOAG VISIT (OUTPATIENT)
Dept: CARDIOLOGY CLINIC | Facility: CLINIC | Age: 83
End: 2018-03-29

## 2018-04-09 ENCOUNTER — ANTICOAG VISIT (OUTPATIENT)
Dept: CARDIOLOGY CLINIC | Facility: CLINIC | Age: 83
End: 2018-04-09

## 2018-04-09 LAB — INR PPP: 1.6 (ref 0.86–1.16)

## 2018-04-16 ENCOUNTER — ANTICOAG VISIT (OUTPATIENT)
Dept: CARDIOLOGY CLINIC | Facility: CLINIC | Age: 83
End: 2018-04-16

## 2018-04-16 LAB — INR PPP: 1.6 (ref 0.86–1.16)

## 2018-04-23 ENCOUNTER — ANTICOAG VISIT (OUTPATIENT)
Dept: CARDIOLOGY CLINIC | Facility: CLINIC | Age: 83
End: 2018-04-23

## 2018-04-23 LAB — INR PPP: 2.2 (ref 0.86–1.16)

## 2018-05-02 ENCOUNTER — ANTICOAG VISIT (OUTPATIENT)
Dept: CARDIOLOGY CLINIC | Facility: CLINIC | Age: 83
End: 2018-05-02

## 2018-05-02 LAB — INR PPP: 1.8 (ref 0.86–1.16)

## 2018-05-09 ENCOUNTER — ANTICOAG VISIT (OUTPATIENT)
Dept: CARDIOLOGY CLINIC | Facility: CLINIC | Age: 83
End: 2018-05-09

## 2018-05-09 LAB — INR PPP: 2.3 (ref 0.86–1.16)

## 2018-05-21 ENCOUNTER — ANTICOAG VISIT (OUTPATIENT)
Dept: CARDIOLOGY CLINIC | Facility: CLINIC | Age: 83
End: 2018-05-21

## 2018-05-21 LAB — INR PPP: 2.6 (ref 0.86–1.17)

## 2018-06-11 LAB — INR PPP: 2.7 (ref 0.86–1.17)

## 2018-06-12 ENCOUNTER — ANTICOAG VISIT (OUTPATIENT)
Dept: CARDIOLOGY CLINIC | Facility: CLINIC | Age: 83
End: 2018-06-12

## 2018-06-14 ENCOUNTER — OFFICE VISIT (OUTPATIENT)
Dept: CARDIOLOGY CLINIC | Facility: CLINIC | Age: 83
End: 2018-06-14
Payer: MEDICARE

## 2018-06-14 VITALS
HEIGHT: 60 IN | BODY MASS INDEX: 25.91 KG/M2 | WEIGHT: 132 LBS | HEART RATE: 72 BPM | SYSTOLIC BLOOD PRESSURE: 114 MMHG | DIASTOLIC BLOOD PRESSURE: 50 MMHG

## 2018-06-14 DIAGNOSIS — I48.91 ATRIAL FIBRILLATION, UNSPECIFIED TYPE (HCC): Primary | ICD-10-CM

## 2018-06-14 PROCEDURE — 99214 OFFICE O/P EST MOD 30 MIN: CPT | Performed by: INTERNAL MEDICINE

## 2018-06-14 PROCEDURE — 93000 ELECTROCARDIOGRAM COMPLETE: CPT | Performed by: INTERNAL MEDICINE

## 2018-06-14 RX ORDER — METOPROLOL SUCCINATE 50 MG/1
1 TABLET, EXTENDED RELEASE ORAL DAILY
Status: ON HOLD | COMMUNITY
Start: 2012-01-30 | End: 2022-05-13 | Stop reason: SDUPTHER

## 2018-06-14 RX ORDER — CALAMINE, MENTHOL, WHITE PETROLATUM, ZINC OXIDE .5; .2; 69; 19.5 G/100G; G/100G; G/100G; G/100G
PASTE TOPICAL
COMMUNITY

## 2018-06-14 RX ORDER — PANTOPRAZOLE SODIUM 40 MG/1
1 TABLET, DELAYED RELEASE ORAL DAILY
COMMUNITY
Start: 2015-09-30

## 2018-06-14 RX ORDER — WARFARIN SODIUM 2.5 MG/1
2.5 TABLET ORAL
Status: ON HOLD | COMMUNITY
Start: 2014-05-27 | End: 2022-05-16 | Stop reason: SDUPTHER

## 2018-06-14 RX ORDER — FUROSEMIDE 80 MG
40 TABLET ORAL DAILY
COMMUNITY
End: 2020-01-23 | Stop reason: ALTCHOICE

## 2018-06-14 RX ORDER — LOPERAMIDE HYDROCHLORIDE 2 MG/1
2 CAPSULE ORAL 3 TIMES DAILY PRN
COMMUNITY

## 2018-06-14 RX ORDER — ACETAMINOPHEN 325 MG/1
TABLET ORAL EVERY 6 HOURS PRN
COMMUNITY

## 2018-06-14 RX ORDER — CERAMIDES 1,3,6-II
CREAM (GRAM) TOPICAL
COMMUNITY

## 2018-06-14 RX ORDER — POTASSIUM CHLORIDE 20 MEQ/1
1 TABLET, EXTENDED RELEASE ORAL EVERY 12 HOURS
COMMUNITY
Start: 2014-02-12

## 2018-06-14 RX ORDER — FERROUS SULFATE 325(65) MG
325 TABLET ORAL
COMMUNITY

## 2018-06-14 RX ORDER — CHOLECALCIFEROL (VITAMIN D3) 125 MCG
CAPSULE ORAL
COMMUNITY
Start: 2016-03-23

## 2018-06-14 NOTE — PROGRESS NOTES
Cardiology Follow Up    Mayra Erazo  2/28/1923  [id number]  HEART & VASCULAR  [facility] Kenia Samano  [address] [place] [address]    1  Atrial fibrillation, unspecified type ([facility] [address] )  POCT ECG       Interval History: Followup for AF    Doing well  No chest pain, no dyspnea, and no palpitations  No past medical history on file  Social History     Social History    Marital status:      Spouse name: N/A    Number of children: N/A    Years of education: N/A     Occupational History    Not on file  Social History Main Topics    Smoking status: Not on file    Smokeless tobacco: Not on file    Alcohol use Not on file    Drug use: Unknown    Sexual activity: Not on file     Other Topics Concern    Not on file     Social History Narrative    No narrative on file      No family history on file  No past surgical history on file      Current Outpatient Prescriptions:     acetaminophen (TYLENOL) 325 mg tablet, Take by mouth, Disp: , Rfl:     cyanocobalamin (VITAMIN B-12) 500 mcg tablet, Take by mouth, Disp: , Rfl:     Dextran 70-Hypromellose (ARTIFICIAL TEARS) 0 1-0 3 % SOLN, Apply to eye, Disp: , Rfl:     Emollient (CERAVE) CREA, Apply topically, Disp: , Rfl:     ferrous sulfate 325 (65 Fe) mg tablet, Take 325 mg by mouth daily with breakfast, Disp: , Rfl:     furosemide (LASIX) 80 mg tablet, Take 1 tablet by mouth daily, Disp: , Rfl:     guaiFENesin (ROBITUSSIN) 100 MG/5ML oral liquid, Take 200 mg by mouth 3 (three) times a day as needed for cough, Disp: , Rfl:     loperamide (IMODIUM) 2 mg capsule, Take by mouth, Disp: , Rfl:     metoprolol succinate (TOPROL-XL) 50 mg 24 hr tablet, Take 1 tablet by mouth daily, Disp: , Rfl:     Zcysh-Gique-Hntwpnc-Pramoxine (TRIPLE ANTIBIOTIC PLUS) 1 % OINT, Apply topically, Disp: , Rfl:     pantoprazole (PROTONIX) 40 mg tablet, Take 1 tablet by mouth daily, Disp: , Rfl:     potassium chloride (K-DUR,KLOR-CON) 20 mEq tablet, Take 1 tablet by mouth every 12 (twelve) hours, Disp: , Rfl:     Saline 0 65 % SOLN, into each nostril, Disp: , Rfl:     Skin Protectants, Misc  (CALAZIME SKIN PROTECTANT) PSTE, Apply topically, Disp: , Rfl:     warfarin (COUMADIN) 2 5 mg tablet, Take by mouth, Disp: , Rfl:   Allergies   Allergen Reactions    Meperidine        Labs:     Chemistry        Component Value Date/Time     04/26/2014 0636    K 3 5 04/26/2014 0636     (H) 04/26/2014 0636    CO2 25 04/26/2014 0636    BUN 42 (H) 04/26/2014 0636    CREATININE 1 04 04/26/2014 0636        Component Value Date/Time    CALCIUM 8 4 04/26/2014 0636    ALKPHOS 103 04/21/2014 0510    AST 61 (H) 04/21/2014 0510    ALT 40 04/21/2014 0510    BILITOT 0 78 04/21/2014 0510            Lab Results   Component Value Date    CHOL 192 11/15/2013     Lab Results   Component Value Date    HDL 70 11/15/2013     Lab Results   Component Value Date    LDLCALC 108 11/15/2013     Lab Results   Component Value Date    TRIG 69 11/15/2013     No components found for: CHOLHDL    Imaging: No results found  EKG: Atrial Fibrillation with Ventricular Pacing  Review of Systems   Constitution: Negative  HENT: Negative  Eyes: Negative  Cardiovascular: Negative  Respiratory: Negative  Endocrine: Negative  Hematologic/Lymphatic: Negative  Skin: Negative  Musculoskeletal: Negative  Gastrointestinal: Negative  Genitourinary: Negative  Neurological: Negative  Psychiatric/Behavioral: Negative  Allergic/Immunologic: Negative  Vitals:    06/14/18 0930   BP: 114/50   Pulse: 72           Physical Exam   Constitutional: She is oriented to person, place, and time  No distress  Eyes: No scleral icterus  Neck: No JVD present  Cardiovascular: Normal rate and regular rhythm  No murmur heard  Pulmonary/Chest: Effort normal and breath sounds normal  No respiratory distress  She has no wheezes   She has no rales  Abdominal: Soft  Bowel sounds are normal  She exhibits no distension  There is no tenderness  There is no rebound  Musculoskeletal: She exhibits no edema  Neurological: She is alert and oriented to person, place, and time  Skin: Skin is warm and dry  She is not diaphoretic  Discussion/Summary:    Persistent AF: Continue Metoprolol and anticoagulation with coumadin    Continue pacemaker checks  Continue Furosemide for LE edema  The patient was counseled regarding diagnostic results, instructions for management, risk factor reductions, impressions  total time of encounter was 25 minutes and 15 minutes was spent counseling

## 2018-06-15 ENCOUNTER — IN-CLINIC DEVICE VISIT (OUTPATIENT)
Dept: CARDIOLOGY CLINIC | Facility: CLINIC | Age: 83
End: 2018-06-15
Payer: MEDICARE

## 2018-06-15 DIAGNOSIS — I44.2 CHB (COMPLETE HEART BLOCK) (HCC): Primary | ICD-10-CM

## 2018-06-15 DIAGNOSIS — Z95.0 PRESENCE OF PERMANENT CARDIAC PACEMAKER: ICD-10-CM

## 2018-06-15 PROCEDURE — 93296 REM INTERROG EVL PM/IDS: CPT | Performed by: INTERNAL MEDICINE

## 2018-06-15 PROCEDURE — 93294 REM INTERROG EVL PM/LDLS PM: CPT | Performed by: INTERNAL MEDICINE

## 2018-06-19 NOTE — PROGRESS NOTES
BSC DUAL PPM (VVIR)  [facility] TRANSMISSION:  BATTERY VOLTAGE ADEQUATE (4 YR)     98% (>40%/CHB)   ALL LEAD PARAMETERS WITHIN NORMAL LIMITS   NO SIGNIFICANT HIGH RATE EPISODES   NORMAL DEVICE FUNCTION   RG

## 2018-07-09 LAB — INR PPP: 2.8 (ref 0.86–1.17)

## 2018-07-10 ENCOUNTER — ANTICOAG VISIT (OUTPATIENT)
Dept: CARDIOLOGY CLINIC | Facility: CLINIC | Age: 83
End: 2018-07-10

## 2018-08-06 ENCOUNTER — ANTICOAG VISIT (OUTPATIENT)
Dept: CARDIOLOGY CLINIC | Facility: CLINIC | Age: 83
End: 2018-08-06

## 2018-08-06 LAB — INR PPP: 2.5 (ref 0.86–1.17)

## 2018-09-04 ENCOUNTER — ANTICOAG VISIT (OUTPATIENT)
Dept: CARDIOLOGY CLINIC | Facility: CLINIC | Age: 83
End: 2018-09-04

## 2018-09-04 LAB — INR PPP: 2.3 (ref 0.86–1.17)

## 2018-09-17 ENCOUNTER — REMOTE DEVICE CLINIC VISIT (OUTPATIENT)
Dept: CARDIOLOGY CLINIC | Facility: CLINIC | Age: 83
End: 2018-09-17
Payer: MEDICARE

## 2018-09-17 DIAGNOSIS — I44.2 CHB (COMPLETE HEART BLOCK) (HCC): Primary | ICD-10-CM

## 2018-09-17 DIAGNOSIS — Z95.0 CARDIAC PACEMAKER IN SITU: ICD-10-CM

## 2018-09-17 PROCEDURE — 93294 REM INTERROG EVL PM/LDLS PM: CPT | Performed by: INTERNAL MEDICINE

## 2018-09-17 PROCEDURE — 93296 REM INTERROG EVL PM/IDS: CPT | Performed by: INTERNAL MEDICINE

## 2018-09-17 NOTE — PROGRESS NOTES
Results for orders placed or performed in visit on 09/17/18   Cardiac EP device report    Narrative    BSC DUAL PPM (VVIR)  LATITUDE TRANSMISSION: BATTERY STATUS "OK"   99%  ALL AVAILABLE LEAD PARAMETERS WITHIN NORMAL LIMITS  NO SIGNIFICANT HIGH RATE EPISODES  NORMAL DEVICE FUNCTION   NC

## 2018-10-02 ENCOUNTER — ANTICOAG VISIT (OUTPATIENT)
Dept: CARDIOLOGY CLINIC | Facility: CLINIC | Age: 83
End: 2018-10-02

## 2018-10-02 LAB — INR PPP: 2.8 (ref 0.86–1.17)

## 2018-10-30 ENCOUNTER — ANTICOAG VISIT (OUTPATIENT)
Dept: CARDIOLOGY CLINIC | Facility: CLINIC | Age: 83
End: 2018-10-30

## 2018-10-30 LAB — INR PPP: 3.5 (ref 0.86–1.17)

## 2018-11-06 ENCOUNTER — ANTICOAG VISIT (OUTPATIENT)
Dept: CARDIOLOGY CLINIC | Facility: CLINIC | Age: 83
End: 2018-11-06

## 2018-11-06 LAB — INR PPP: 2.3 (ref 0.86–1.17)

## 2018-11-06 NOTE — PROGRESS NOTES
10/30/18, , tc to raad at [facility], hold x 1 day, then decrease dose, 5 mg mon/th/sat, 2 5 mg other days of the week  inr due 1 week  Faxed to Deb Ramirez at [phone]  hever   11/6/18, above description was copied today  11/6/18, tc to pt   Will contact Deb Ramirez  Called to Deb Ramirez, left m,essager  To glenys current dose, inr due 2 weeks, faxed to Deb Ramirez   [place]

## 2018-11-20 ENCOUNTER — ANTICOAG VISIT (OUTPATIENT)
Dept: CARDIOLOGY CLINIC | Facility: CLINIC | Age: 83
End: 2018-11-20

## 2018-11-20 LAB — INR PPP: 2.4 (ref 0.86–1.17)

## 2018-11-20 NOTE — PROGRESS NOTES
11/20/18, tc to Deb Ramirez, raad  Will continue current dose, inr due 3 weeks  Faxed to Deb Ramirez

## 2018-12-11 ENCOUNTER — ANTICOAG VISIT (OUTPATIENT)
Dept: CARDIOLOGY CLINIC | Facility: CLINIC | Age: 83
End: 2018-12-11

## 2018-12-11 LAB — INR PPP: 2 (ref 0.86–1.17)

## 2018-12-19 ENCOUNTER — REMOTE DEVICE CLINIC VISIT (OUTPATIENT)
Dept: CARDIOLOGY CLINIC | Facility: CLINIC | Age: 83
End: 2018-12-19
Payer: MEDICARE

## 2018-12-19 DIAGNOSIS — I44.30 AVB (ATRIOVENTRICULAR BLOCK): ICD-10-CM

## 2018-12-19 DIAGNOSIS — I48.19 PERSISTENT ATRIAL FIBRILLATION (HCC): Primary | ICD-10-CM

## 2018-12-19 DIAGNOSIS — Z95.0 PRESENCE OF CARDIAC PACEMAKER: ICD-10-CM

## 2018-12-19 PROCEDURE — 93296 REM INTERROG EVL PM/IDS: CPT | Performed by: INTERNAL MEDICINE

## 2018-12-19 PROCEDURE — 93294 REM INTERROG EVL PM/LDLS PM: CPT | Performed by: INTERNAL MEDICINE

## 2018-12-19 NOTE — PROGRESS NOTES
Results for orders placed or performed in visit on 12/19/18   Cardiac EP device report    Narrative    BSC DUAL PPM (VVIR)  LATITUDE TRANSMISSION:  BATTERY VOLTAGE ADEQUATE (3 5 YRS)  : 92% (>40% DEPENDENT)  ALL AVAILABLE LEAD PARAMETERS WITHIN NORMAL LIMITS   1 NONSUSTV EPISODE W/ EGRAM SHOWING NSVT 6 BEATS @ 170 BPM  NO OTHER SIGNIFICANT HIGH RATE EPISODES  PT TAKES WARFARIN, METOPROLOL SUCC  EF: 60% (ECHO 06/16/14)  PACEMAKER FUNCTIONING APPROPRIATELY    [address] [place] [address]

## 2019-01-04 ENCOUNTER — TELEPHONE (OUTPATIENT)
Dept: CARDIOLOGY CLINIC | Facility: CLINIC | Age: 84
End: 2019-01-04

## 2019-01-04 NOTE — TELEPHONE ENCOUNTER
reviewed message  I called to [address], spoke with [facility] nurse, June, [phone],  reviewed message from joya rico  Nurse June replied, patient was not referring to warfarin  patient was referring to laxis  Nurse reports patient was seen by resident nurse PA, on 12/28  At that time PA noted that last bun/cr were 36/2 1  Based on lab, she decreased lasix to 60 mg daily    Also ordered daily weight and b/p monitoring as well as BMP to be done 1/24  Per nurse, June, patient was concerned that her weight was up and was requesting ov with dr Cheyanne Douglass  Called to , Shania Justin, left message with her to call our  office as to review above and  schedule ov with dr Cheyanne Douglass as per  request    Will notify dr Cheyanne Douglass of call

## 2019-01-04 NOTE — TELEPHONE ENCOUNTER
Pt resides at [facility] and stated Warfarin dose was recently (within the last 7 days) cut in half by resident physician Dr Nina BOLTON due to Kidney function  Pt stated she wanted to make sure that Dr Jamshid Wilson was aware and get his advise, ("make sure he knows what is going on at [address] [place] [address] when dose was changed (within past 7 days) pt weighed 127lbs now pt weighs 130lbs,      5 mg Mon, Thurs, Sats  2 5 mg all other days         Please advise

## 2019-01-08 ENCOUNTER — ANTICOAG VISIT (OUTPATIENT)
Dept: CARDIOLOGY CLINIC | Facility: CLINIC | Age: 84
End: 2019-01-08

## 2019-01-08 LAB — INR PPP: 2.2 (ref 0.86–1.17)

## 2019-01-08 NOTE — PROGRESS NOTES
1/8/19, tc to Deb Ramirez, will continue current dose, inr due 4 weeks  Will fax to [facility] nurse   hever

## 2019-01-24 ENCOUNTER — OFFICE VISIT (OUTPATIENT)
Dept: CARDIOLOGY CLINIC | Facility: CLINIC | Age: 84
End: 2019-01-24
Payer: MEDICARE

## 2019-01-24 VITALS
SYSTOLIC BLOOD PRESSURE: 118 MMHG | WEIGHT: 133 LBS | BODY MASS INDEX: 26.11 KG/M2 | HEART RATE: 60 BPM | DIASTOLIC BLOOD PRESSURE: 64 MMHG | HEIGHT: 60 IN

## 2019-01-24 DIAGNOSIS — I48.0 PAROXYSMAL ATRIAL FIBRILLATION (HCC): Primary | ICD-10-CM

## 2019-01-24 PROCEDURE — 99214 OFFICE O/P EST MOD 30 MIN: CPT | Performed by: INTERNAL MEDICINE

## 2019-01-24 PROCEDURE — 93000 ELECTROCARDIOGRAM COMPLETE: CPT | Performed by: INTERNAL MEDICINE

## 2019-01-24 NOTE — PROGRESS NOTES
Cardiology Follow Up    Britni Huerta  2/28/1923  [address] [facility] [phone]    1  Paroxysmal atrial fibrillation (HCC)  POCT ECG       Interval History: Followup for afib and pacemaker implant    Has some fatigue  Lasix is 60mg daily  Some LE edema that is stable  No chest pain  No past medical history on file  Social History     Social History    Marital status:      Spouse name: N/A    Number of children: N/A    Years of education: N/A     Occupational History    Not on file  Social History Main Topics    Smoking status: Not on file    Smokeless tobacco: Not on file    Alcohol use Not on file    Drug use: Unknown    Sexual activity: Not on file     Other Topics Concern    Not on file     Social History Narrative    No narrative on file      No family history on file  No past surgical history on file      Current Outpatient Prescriptions:     acetaminophen (TYLENOL) 325 mg tablet, Take by mouth every 6 (six) hours as needed  , Disp: , Rfl:     cyanocobalamin (VITAMIN B-12) 500 mcg tablet, Take by mouth, Disp: , Rfl:     Dextran 70-Hypromellose (ARTIFICIAL TEARS) 0 1-0 3 % SOLN, Apply to eye, Disp: , Rfl:     ferrous sulfate 325 (65 Fe) mg tablet, Take 325 mg by mouth daily with breakfast, Disp: , Rfl:     furosemide (LASIX) 80 mg tablet, Take 40 mg by mouth daily  , Disp: , Rfl:     guaiFENesin (ROBITUSSIN) 100 MG/5ML oral liquid, Take 200 mg by mouth 3 (three) times a day as needed for cough, Disp: , Rfl:     loperamide (IMODIUM) 2 mg capsule, Take by mouth, Disp: , Rfl:     metoprolol succinate (TOPROL-XL) 50 mg 24 hr tablet, Take 1 tablet by mouth daily, Disp: , Rfl:     Nyixr-Tgnqo-Nllgspo-Pramoxine (TRIPLE ANTIBIOTIC PLUS) 1 % OINT, Apply topically, Disp: , Rfl:     pantoprazole (PROTONIX) 40 mg tablet, Take 1 tablet by mouth daily, Disp: , Rfl:     potassium chloride (K-DUR,KLOR-CON) 20 mEq tablet, Take 1 tablet by mouth every 12 (twelve) hours, Disp: , Rfl:     Saline 0 65 % SOLN, into each nostril, Disp: , Rfl:     Skin Protectants, Misc  (CALAZIME SKIN PROTECTANT) PSTE, Apply topically, Disp: , Rfl:     warfarin (COUMADIN) 2 5 mg tablet, Take by mouth, Disp: , Rfl:     Emollient (CERAVE) CREA, Apply topically, Disp: , Rfl:   Allergies   Allergen Reactions    Meperidine      Demerol     Xarelto [Rivaroxaban]      Internal bleeding         Labs:     Chemistry        Component Value Date/Time     04/26/2014 0636    K 3 5 04/26/2014 0636     (H) 04/26/2014 0636    CO2 25 04/26/2014 0636    BUN 42 (H) 04/26/2014 0636    CREATININE 1 04 04/26/2014 0636        Component Value Date/Time    CALCIUM 8 4 04/26/2014 0636    ALKPHOS 103 04/21/2014 0510    AST 61 (H) 04/21/2014 0510    ALT 40 04/21/2014 0510    BILITOT 0 78 04/21/2014 0510            Lab Results   Component Value Date    CHOL 192 11/15/2013     Lab Results   Component Value Date    HDL 70 11/15/2013     Lab Results   Component Value Date    LDLCALC 108 11/15/2013     Lab Results   Component Value Date    TRIG 69 11/15/2013     No results found for: CHOLHDL    Imaging: No results found  EKG: Atrial Fibrillation with ventricular pacing  Review of Systems   Constitution: Positive for malaise/fatigue  HENT: Negative  Eyes: Negative  Cardiovascular: Positive for leg swelling  Respiratory: Negative  Endocrine: Negative  Hematologic/Lymphatic: Negative  Skin: Negative  Musculoskeletal: Negative  Gastrointestinal: Negative  Genitourinary: Negative  Neurological: Negative  Psychiatric/Behavioral: Negative  Allergic/Immunologic: Negative  Vitals:    01/24/19 1408   BP: 118/64   Pulse: 60           Physical Exam   Constitutional: She is oriented to person, place, and time  No distress     HENT:   Mouth/Throat: No oropharyngeal exudate  Eyes: No scleral icterus  Neck: No JVD present  Cardiovascular: Normal rate  An irregularly irregular rhythm present  Pulmonary/Chest: Effort normal and breath sounds normal  No respiratory distress  She has no wheezes  She has no rales  Abdominal: Soft  Bowel sounds are normal  She exhibits no distension  There is no tenderness  There is no rebound  Musculoskeletal: She exhibits edema  Neurological: She is alert and oriented to person, place, and time  Skin: Skin is warm and dry  She is not diaphoretic  Psychiatric: She has a normal mood and affect  Her behavior is normal        Discussion/Summary:    Persistent AF: Continue Metoprolol and anticoagulation with coumadin     Continue pacemaker checks       Continue Furosemide for LE edema  The patient was counseled regarding diagnostic results, instructions for management, risk factor reductions, impressions  total time of encounter was 25 minutes and 15 minutes was spent counseling

## 2019-02-05 ENCOUNTER — ANTICOAG VISIT (OUTPATIENT)
Dept: CARDIOLOGY CLINIC | Facility: CLINIC | Age: 84
End: 2019-02-05

## 2019-02-05 LAB — INR PPP: 2.4 (ref 0.86–1.17)

## 2019-02-05 NOTE — PROGRESS NOTES
2/5/19, tc to [address] [facility], June  Continue current dose, inr due 4 weeks  Faxed same to [employer]   hever

## 2019-03-04 ENCOUNTER — TELEPHONE (OUTPATIENT)
Dept: CARDIOLOGY CLINIC | Facility: CLINIC | Age: 84
End: 2019-03-04

## 2019-03-04 NOTE — TELEPHONE ENCOUNTER
Pt called to cancel and reschedule her appt for her Device check due to no transportation  Message was given to  to return call when she arrives

## 2019-03-05 ENCOUNTER — ANTICOAG VISIT (OUTPATIENT)
Dept: CARDIOLOGY CLINIC | Facility: CLINIC | Age: 84
End: 2019-03-05

## 2019-03-05 LAB — INR PPP: 2.4 (ref 0.86–1.17)

## 2019-03-05 NOTE — PROGRESS NOTES
3/5/19, tc to [address] [facility], spoke with Edilson Naranjo nurse, will continue current dose, inr due 4 weeks  Faxed to alphonso  Reminded to call if any changes in health, medications, diet or bleeding   hever

## 2019-03-27 ENCOUNTER — IN-CLINIC DEVICE VISIT (OUTPATIENT)
Dept: CARDIOLOGY CLINIC | Facility: CLINIC | Age: 84
End: 2019-03-27
Payer: MEDICARE

## 2019-03-27 DIAGNOSIS — I48.20 CHRONIC ATRIAL FIBRILLATION (HCC): Primary | ICD-10-CM

## 2019-03-27 DIAGNOSIS — I44.2 AV BLOCK, COMPLETE (HCC): ICD-10-CM

## 2019-03-27 DIAGNOSIS — Z95.0 CARDIAC PACEMAKER: ICD-10-CM

## 2019-03-27 PROCEDURE — 93279 PRGRMG DEV EVAL PM/LDLS PM: CPT | Performed by: INTERNAL MEDICINE

## 2019-03-27 NOTE — PROGRESS NOTES
Results for orders placed or performed in visit on 03/27/19   Cardiac EP device report    Narrative    [facility]UAL [facility] (VVIR)  DEVICE INTERROGATED IN THE [facility] OFFICE:BATTERY VOLTAGE ADEQUATE (3 YRS)   - 99% (>40%/DEPENDENT)  ALL LEAD PARAMETERS WITHIN NORMAL LIMITS  ALL OTHER TESTING WITHIN NORMAL LIMITS  1 NEW VHR EPISODE FOR PROBABLE NSVT vs RVR @ 11 BEATS, AVG CL~330MS  EF-60% (ECHO 6/16/14)  CHRONIC AF - PT ON WARFARIN, METOPROLOL  NO PROGRAMMING CHANGES MADE TO DEVICE PARAMETERS  PACEMAKER FUNCTIONING APPROPRIATELY     EB

## 2019-04-02 ENCOUNTER — ANTICOAG VISIT (OUTPATIENT)
Dept: CARDIOLOGY CLINIC | Facility: CLINIC | Age: 84
End: 2019-04-02

## 2019-04-02 LAB — INR PPP: 2 (ref 0.86–1.17)

## 2019-04-30 ENCOUNTER — ANTICOAG VISIT (OUTPATIENT)
Dept: CARDIOLOGY CLINIC | Facility: CLINIC | Age: 84
End: 2019-04-30

## 2019-04-30 LAB — INR PPP: 1.7 (ref 0.86–1.17)

## 2019-05-07 ENCOUNTER — ANTICOAG VISIT (OUTPATIENT)
Dept: CARDIOLOGY CLINIC | Facility: CLINIC | Age: 84
End: 2019-05-07

## 2019-05-07 LAB — INR PPP: 2.3 (ref 0.86–1.17)

## 2019-05-21 ENCOUNTER — ANTICOAG VISIT (OUTPATIENT)
Dept: CARDIOLOGY CLINIC | Facility: CLINIC | Age: 84
End: 2019-05-21

## 2019-05-21 LAB — INR PPP: 2.6 (ref 0.86–1.17)

## 2019-06-10 ENCOUNTER — ANTICOAG VISIT (OUTPATIENT)
Dept: CARDIOLOGY CLINIC | Facility: CLINIC | Age: 84
End: 2019-06-10

## 2019-06-10 LAB — INR PPP: 3.1 (ref 0.86–1.17)

## 2019-06-17 ENCOUNTER — ANTICOAG VISIT (OUTPATIENT)
Dept: CARDIOLOGY CLINIC | Facility: CLINIC | Age: 84
End: 2019-06-17

## 2019-06-17 LAB — INR PPP: 2.3 (ref 0.86–1.17)

## 2019-07-01 ENCOUNTER — ANTICOAG VISIT (OUTPATIENT)
Dept: CARDIOLOGY CLINIC | Facility: CLINIC | Age: 84
End: 2019-07-01

## 2019-07-01 LAB — INR PPP: 2.6 (ref 0.84–1.19)

## 2019-07-01 NOTE — PROGRESS NOTES
7/1/19, tc to Deb Ramirez, left message on nurse line, will fax instructions to continue current dose, inr due 3 weeks  Faxed to Deb kathleen

## 2019-07-03 ENCOUNTER — REMOTE DEVICE CLINIC VISIT (OUTPATIENT)
Dept: CARDIOLOGY CLINIC | Facility: CLINIC | Age: 84
End: 2019-07-03
Payer: MEDICARE

## 2019-07-03 DIAGNOSIS — Z95.0 CARDIAC PACEMAKER: Primary | ICD-10-CM

## 2019-07-03 PROCEDURE — 93296 REM INTERROG EVL PM/IDS: CPT | Performed by: INTERNAL MEDICINE

## 2019-07-03 PROCEDURE — 93294 REM INTERROG EVL PM/LDLS PM: CPT | Performed by: INTERNAL MEDICINE

## 2019-07-03 NOTE — PROGRESS NOTES
Results for orders placed or performed in visit on 07/03/19   Cardiac EP device report    Narrative    BSC DUAL PPM (VVIR)  LATITUDE TRANSMISSION: BATTERY VOLTAGE ADEQUATE (3 YRS)   - 100% (>40%/DEPENDENT)  ALL AVAILABLE LEAD PARAMETERS WITHIN NORMAL LIMITS  1 NEW VHR EPISODE FOR NSVT vs RVR @ 6 BEATS, AVG CL~340MS  EF-60% (ECHO 6/16/14)  CHRONIC AF - PT ON WARFARIN, METOPROLOL  TASK TO DR WERNER  NORMAL DEVICE FUNCTION     EB

## 2019-07-15 LAB — INR PPP: 2.4 (ref 0.84–1.19)

## 2019-07-22 ENCOUNTER — ANTICOAG VISIT (OUTPATIENT)
Dept: CARDIOLOGY CLINIC | Facility: CLINIC | Age: 84
End: 2019-07-22

## 2019-07-22 NOTE — PROGRESS NOTES
7/22/19, tc to [address] Ramirez at [facility], spoke with frank, continue current dose, inr due 4 weeks  Nurse to call if any questions or concerns or bleeding  faxed same to [address] [facility]

## 2019-07-23 ENCOUNTER — OFFICE VISIT (OUTPATIENT)
Dept: CARDIOLOGY CLINIC | Facility: CLINIC | Age: 84
End: 2019-07-23
Payer: MEDICARE

## 2019-07-23 VITALS
BODY MASS INDEX: 25.68 KG/M2 | SYSTOLIC BLOOD PRESSURE: 114 MMHG | HEART RATE: 60 BPM | DIASTOLIC BLOOD PRESSURE: 60 MMHG | HEIGHT: 60 IN | WEIGHT: 130.8 LBS

## 2019-07-23 DIAGNOSIS — I48.20 CHRONIC ATRIAL FIBRILLATION (HCC): Primary | ICD-10-CM

## 2019-07-23 PROCEDURE — 99214 OFFICE O/P EST MOD 30 MIN: CPT | Performed by: INTERNAL MEDICINE

## 2019-07-23 RX ORDER — FLUTICASONE PROPIONATE 50 MCG
1 SPRAY, SUSPENSION (ML) NASAL DAILY
COMMUNITY

## 2019-07-23 RX ORDER — WARFARIN SODIUM 5 MG/1
5 TABLET ORAL
COMMUNITY
Start: 2019-06-01

## 2019-07-23 NOTE — PROGRESS NOTES
Cardiology Follow Up    Jessica Marquez  2/28/1923  [address] [facility] [address] [phone]    1  Chronic atrial fibrillation (HCC)  POCT ECG       Interval History: Followup AF    Doing well  Minor dyspnea  No chest pain  No past medical history on file  Social History     Socioeconomic History    Marital status:      Spouse name: Not on file    Number of children: Not on file    Years of education: Not on file    Highest education level: Not on file   Occupational History    Not on file   Social Needs    Financial resource strain: Not on file    Food insecurity:     Worry: Not on file     Inability: Not on file    Transportation needs:     Medical: Not on file     Non-medical: Not on file   Tobacco Use    Smoking status: Not on file   Substance and Sexual Activity    Alcohol use: Not on file    Drug use: Not on file    Sexual activity: Not on file   Lifestyle    Physical activity:     Days per week: Not on file     Minutes per session: Not on file    Stress: Not on file   Relationships    Social connections:     Talks on phone: Not on file     Gets together: Not on file     Attends [employer] service: Not on file     Active member of club or organization: Not on file     Attends meetings of clubs or organizations: Not on file     Relationship status: Not on file    Intimate partner violence:     Fear of current or ex partner: Not on file     Emotionally abused: Not on file     Physically abused: Not on file     Forced sexual activity: Not on file   Other Topics Concern    Not on file   Social History Narrative    Not on file      No family history on file  No past surgical history on file      Current Outpatient Medications:     acetaminophen (TYLENOL) 325 mg tablet, Take by mouth every 6 (six) hours as needed  , Disp: , Rfl:     cyanocobalamin (VITAMIN B-12) 500 mcg tablet, Take by mouth, Disp: , Rfl:     Dextran 70-Hypromellose (ARTIFICIAL TEARS) 0 1-0 3 % SOLN, Apply to eye, Disp: , Rfl:     ferrous sulfate 325 (65 Fe) mg tablet, Take 325 mg by mouth daily with breakfast, Disp: , Rfl:     fluticasone (FLONASE) 50 mcg/act nasal spray, 1 spray into each nostril daily, Disp: , Rfl:     furosemide (LASIX) 80 mg tablet, Take 40 mg by mouth daily  , Disp: , Rfl:     guaiFENesin (ROBITUSSIN) 100 MG/5ML oral liquid, Take 200 mg by mouth 3 (three) times a day as needed for cough, Disp: , Rfl:     loperamide (IMODIUM) 2 mg capsule, Take by mouth, Disp: , Rfl:     metoprolol succinate (TOPROL-XL) 50 mg 24 hr tablet, Take 1 tablet by mouth daily, Disp: , Rfl:     Omdhb-Epfyf-Yglkxuk-Pramoxine (TRIPLE ANTIBIOTIC PLUS) 1 % OINT, Apply topically, Disp: , Rfl:     pantoprazole (PROTONIX) 40 mg tablet, Take 1 tablet by mouth daily, Disp: , Rfl:     potassium chloride (K-DUR,KLOR-CON) 20 mEq tablet, Take 1 tablet by mouth every 12 (twelve) hours, Disp: , Rfl:     Saline 0 65 % SOLN, into each nostril, Disp: , Rfl:     warfarin (COUMADIN) 2 5 mg tablet, Take by mouth, Disp: , Rfl:     warfarin (COUMADIN) 5 mg tablet, , Disp: , Rfl:     Emollient (CERAVE) CREA, Apply topically, Disp: , Rfl:     Skin Protectants, Misc   (CALAZIME SKIN PROTECTANT) PSTE, Apply topically, Disp: , Rfl:   Allergies   Allergen Reactions    Meperidine      Demerol     Xarelto [Rivaroxaban]      Internal bleeding         Labs:     Chemistry        Component Value Date/Time     04/26/2014 0636    K 3 5 04/26/2014 0636     (H) 04/26/2014 0636    CO2 25 04/26/2014 0636    BUN 42 (H) 04/26/2014 0636    CREATININE 1 04 04/26/2014 0636        Component Value Date/Time    CALCIUM 8 4 04/26/2014 0636    ALKPHOS 103 04/21/2014 0510    AST 61 (H) 04/21/2014 0510    ALT 40 04/21/2014 0510    BILITOT 0 78 04/21/2014 0510            Lab Results   Component Value Date    CHOL 192 11/15/2013 Lab Results   Component Value Date    HDL 70 11/15/2013     Lab Results   Component Value Date    LDLCALC 108 11/15/2013     Lab Results   Component Value Date    TRIG 69 11/15/2013     No results found for: CHOLHDL    Imaging: No results found       Review of Systems   Constitution: Negative  HENT: Negative  Eyes: Negative  Cardiovascular: Positive for dyspnea on exertion  Respiratory: Positive for shortness of breath  Endocrine: Negative  Hematologic/Lymphatic: Negative  Skin: Negative  Musculoskeletal: Negative  Gastrointestinal: Negative  Genitourinary: Negative  Neurological: Negative  Psychiatric/Behavioral: Negative  Allergic/Immunologic: Negative  Vitals:    07/23/19 1144   BP: 114/60   Pulse: 60           Physical Exam   Constitutional: She is oriented to person, place, and time  No distress  HENT:   Mouth/Throat: No oropharyngeal exudate  Neck: No JVD present  Cardiovascular: Normal rate and regular rhythm  Pulmonary/Chest: Effort normal and breath sounds normal  No stridor  No respiratory distress  She has no wheezes  Abdominal: Soft  Bowel sounds are normal  She exhibits no distension  There is no tenderness  There is no guarding  Musculoskeletal: She exhibits no edema  Neurological: She is alert and oriented to person, place, and time  Skin: Skin is warm and dry  She is not diaphoretic  Psychiatric: She has a normal mood and affect  Her behavior is normal        Discussion/Summary:    Persistent AF: Continue Metoprolol and anticoagulation with coumadin  Her last pacemaker check was fine  She is predominately Ventricular paced  INRs r stable       Continue pacemaker checks       Continue Furosemide for LE edema  The patient was counseled regarding diagnostic results, instructions for management, risk factor reductions, impressions  total time of encounter was 25 minutes and 15 minutes was spent counseling

## 2019-08-19 ENCOUNTER — ANTICOAG VISIT (OUTPATIENT)
Dept: CARDIOLOGY CLINIC | Facility: CLINIC | Age: 84
End: 2019-08-19

## 2019-08-19 LAB — INR PPP: 2.4 (ref 0.84–1.19)

## 2019-08-19 NOTE — PROGRESS NOTES
8/19/19, tc to [address] [facility], spoke with nurse,laisha, will continue current dose, inr due 4 weeks   Faxed to  Shelby Ramirez

## 2019-09-16 ENCOUNTER — ANTICOAG VISIT (OUTPATIENT)
Dept: CARDIOLOGY CLINIC | Facility: CLINIC | Age: 84
End: 2019-09-16

## 2019-09-16 LAB — INR PPP: 2 (ref 0.84–1.19)

## 2019-09-16 NOTE — PROGRESS NOTES
9/16/19, tc to [address] [facility], spoke with chandan, will fax to [facility] nurse, continue current dose, inr due 4 weeks  If any questions,nurse to call office  hever

## 2019-10-07 ENCOUNTER — REMOTE DEVICE CLINIC VISIT (OUTPATIENT)
Dept: CARDIOLOGY CLINIC | Facility: CLINIC | Age: 84
End: 2019-10-07
Payer: MEDICARE

## 2019-10-07 DIAGNOSIS — Z95.0 PRESENCE OF CARDIAC PACEMAKER: Primary | ICD-10-CM

## 2019-10-07 PROCEDURE — 93296 REM INTERROG EVL PM/IDS: CPT | Performed by: INTERNAL MEDICINE

## 2019-10-07 PROCEDURE — 93294 REM INTERROG EVL PM/LDLS PM: CPT | Performed by: INTERNAL MEDICINE

## 2019-10-07 NOTE — PROGRESS NOTES
Results for orders placed or performed in visit on 10/07/19   Cardiac EP device report    Narrative    BSC DUAL PPM (VVIR)  LATITUDE TRANSMISSION: BATTERY VOLTAGE ADEQUATE (2 5 YRS)  : 100% (>40%~DEPENDENT)  ALL AVAILABLE LEAD PARAMETERS WITHIN NORMAL LIMITS  NO SIGNIFICANT HIGH RATE EPISODES  PACEMAKER FUNCTIONING APPROPRIATELY    [facility]

## 2019-10-11 ENCOUNTER — ANTICOAG VISIT (OUTPATIENT)
Dept: CARDIOLOGY CLINIC | Facility: CLINIC | Age: 84
End: 2019-10-11

## 2019-10-11 LAB — INR PPP: 2.1 (ref 0.84–1.19)

## 2019-10-11 NOTE — PROGRESS NOTES
Phone call from raad at [facility], pt was started on keflex x 7 days for cellulitis  Started 2 days ago  Faxed to continue current dose, inr  due lizeth kathleen

## 2019-10-15 ENCOUNTER — ANTICOAG VISIT (OUTPATIENT)
Dept: CARDIOLOGY CLINIC | Facility: CLINIC | Age: 84
End: 2019-10-15

## 2019-10-15 LAB — INR PPP: 2 (ref 0.84–1.19)

## 2019-10-15 NOTE — PROGRESS NOTES
10/15/19, tc to [address], will continue current dose, inr due 1 week  Faxed same to [facility] [facility]

## 2019-10-21 LAB — INR PPP: 2.1 (ref 0.84–1.19)

## 2019-10-22 ENCOUNTER — ANTICOAG VISIT (OUTPATIENT)
Dept: CARDIOLOGY CLINIC | Facility: CLINIC | Age: 84
End: 2019-10-22

## 2019-10-22 NOTE — PROGRESS NOTES
10/22/19, tc to [address] [facility], spoke with raad Faria  Pt taking keflex x 5 days, last dose, 10/23  For cellulitis  Will continue current dose, inr due 1 week  Faxed to Deb kathleen

## 2019-10-28 ENCOUNTER — ANTICOAG VISIT (OUTPATIENT)
Dept: CARDIOLOGY CLINIC | Facility: CLINIC | Age: 84
End: 2019-10-28

## 2019-10-28 LAB — INR PPP: 2.6 (ref 0.84–1.19)

## 2019-10-28 NOTE — PROGRESS NOTES
10/28/19, tc to [facility] nurse, keflex completed  Will continue current dose, inr due 1 week  Faxed to Deb kathleen

## 2019-11-04 ENCOUNTER — ANTICOAG VISIT (OUTPATIENT)
Dept: CARDIOLOGY CLINIC | Facility: CLINIC | Age: 84
End: 2019-11-04

## 2019-11-04 LAB — INR PPP: 2.4 (ref 0.84–1.19)

## 2019-11-04 NOTE — PROGRESS NOTES
11/4/19, tc to [address] [facility], spoke with nurse, Nichole Jain, will fax to continue current dose, inr due 2 weeks   hever

## 2019-11-18 ENCOUNTER — ANTICOAG VISIT (OUTPATIENT)
Dept: CARDIOLOGY CLINIC | Facility: CLINIC | Age: 84
End: 2019-11-18

## 2019-11-18 LAB — INR PPP: 2.5 (ref 0.84–1.19)

## 2019-11-18 NOTE — PROGRESS NOTES
11/18/19, tc to [address] [facility], spoke with [facility] nurse, will continue current dose, inr due 3 weeks  Faxed same to nurse   hever

## 2019-12-09 ENCOUNTER — ANTICOAG VISIT (OUTPATIENT)
Dept: CARDIOLOGY CLINIC | Facility: CLINIC | Age: 84
End: 2019-12-09

## 2019-12-09 LAB — INR PPP: 2.5 (ref 0.84–1.19)

## 2019-12-09 NOTE — PROGRESS NOTES
12/9/19, tc to mavis shankar nurse, denies any changes in health, medication or bleeding  Will continue current dose, inr due 4 weeks  Faxed same to nurse   hever

## 2020-01-06 ENCOUNTER — ANTICOAG VISIT (OUTPATIENT)
Dept: CARDIOLOGY CLINIC | Facility: CLINIC | Age: 85
End: 2020-01-06

## 2020-01-06 LAB — INR PPP: 2.3 (ref 0.84–1.19)

## 2020-01-06 NOTE — PROGRESS NOTES
1/6/20, tc to [address] [facility], spoke with nurse, Chasidy Solis, will continue current dose, inr due 4 weeks  Faxed to Angelina Walker nurse   hever

## 2020-01-09 ENCOUNTER — REMOTE DEVICE CLINIC VISIT (OUTPATIENT)
Dept: CARDIOLOGY CLINIC | Facility: CLINIC | Age: 85
End: 2020-01-09
Payer: MEDICARE

## 2020-01-09 DIAGNOSIS — Z95.0 CARDIAC PACEMAKER IN SITU: Primary | ICD-10-CM

## 2020-01-09 PROCEDURE — 93294 REM INTERROG EVL PM/LDLS PM: CPT | Performed by: INTERNAL MEDICINE

## 2020-01-09 PROCEDURE — 93296 REM INTERROG EVL PM/IDS: CPT | Performed by: INTERNAL MEDICINE

## 2020-01-09 NOTE — PROGRESS NOTES
Results for orders placed or performed in visit on 01/09/20   Cardiac EP device report    Narrative    BSC DUAL PPM (VVIR)/ NOT MRI CONDITIONAL  LATITUDE TRANSMISSION: BATTERY VOLTAGE ADEQUATE (2 5 YRS)  -100% (DEPENDENT)  ALL AVAILABLE LEAD PARAMETERS WITHIN NORMAL LIMITS  2 NEW NSVT EPISODES- 13 BEATS, AVG CL~355MS & 4 BEATS, AVG CL~325MS  EF-60% (ECHO 6/16/14)  PT ON WARFARIN & METOPROLOL  TASKED DR Umair Johnson  NORMAL DEVICE FUNCTION   GV

## 2020-01-23 ENCOUNTER — OFFICE VISIT (OUTPATIENT)
Dept: CARDIOLOGY CLINIC | Facility: CLINIC | Age: 85
End: 2020-01-23
Payer: MEDICARE

## 2020-01-23 VITALS
WEIGHT: 136 LBS | SYSTOLIC BLOOD PRESSURE: 128 MMHG | DIASTOLIC BLOOD PRESSURE: 60 MMHG | HEIGHT: 60 IN | HEART RATE: 62 BPM | BODY MASS INDEX: 26.7 KG/M2

## 2020-01-23 DIAGNOSIS — I48.20 CHRONIC ATRIAL FIBRILLATION (HCC): Primary | ICD-10-CM

## 2020-01-23 DIAGNOSIS — I50.32 CHRONIC DIASTOLIC CHF (CONGESTIVE HEART FAILURE) (HCC): ICD-10-CM

## 2020-01-23 PROCEDURE — 93000 ELECTROCARDIOGRAM COMPLETE: CPT | Performed by: INTERNAL MEDICINE

## 2020-01-23 PROCEDURE — 99214 OFFICE O/P EST MOD 30 MIN: CPT | Performed by: INTERNAL MEDICINE

## 2020-01-23 RX ORDER — FUROSEMIDE 40 MG/1
40 TABLET ORAL DAILY
COMMUNITY
Start: 2019-12-01 | End: 2020-01-23 | Stop reason: SDUPTHER

## 2020-01-23 RX ORDER — FUROSEMIDE 80 MG
80 TABLET ORAL DAILY
Qty: 90 TABLET | Refills: 3 | Status: SHIPPED | OUTPATIENT
Start: 2020-01-23 | End: 2022-03-25 | Stop reason: ALTCHOICE

## 2020-01-23 NOTE — PROGRESS NOTES
Cardiology Follow Up    Carlita Arce  2/28/1923  [address] [facility] Charito Estevez [facility] [address] [phone]    1  Chronic atrial fibrillation  POCT ECG       Interval History: Followup for atrial fibrillation    She has an increase in LE edema  No chest pain ,dyspnea or palpitations  INRs stable  No past medical history on file  Social History     Socioeconomic History    Marital status:      Spouse name: Not on file    Number of children: Not on file    Years of education: Not on file    Highest education level: Not on file   Occupational History    Not on file   Social Needs    Financial resource strain: Not on file    Food insecurity:     Worry: Not on file     Inability: Not on file    Transportation needs:     Medical: Not on file     Non-medical: Not on file   Tobacco Use    Smoking status: Not on file   Substance and Sexual Activity    Alcohol use: Not on file    Drug use: Not on file    Sexual activity: Not on file   Lifestyle    Physical activity:     Days per week: Not on file     Minutes per session: Not on file    Stress: Not on file   Relationships    Social connections:     Talks on phone: Not on file     Gets together: Not on file     Attends Zoroastrianism service: Not on file     Active member of club or organization: Not on file     Attends meetings of clubs or organizations: Not on file     Relationship status: Not on file    Intimate partner violence:     Fear of current or ex partner: Not on file     Emotionally abused: Not on file     Physically abused: Not on file     Forced sexual activity: Not on file   Other Topics Concern    Not on file   Social History Narrative    Not on file      No family history on file  No past surgical history on file      Current Outpatient Medications:     acetaminophen (TYLENOL) 325 mg tablet, Take by mouth every 6 (six) hours as needed  , Disp: , Rfl:     cyanocobalamin (VITAMIN B-12) 500 mcg tablet, Take by mouth, Disp: , Rfl:     Dextran 70-Hypromellose (ARTIFICIAL TEARS) 0 1-0 3 % SOLN, Apply to eye, Disp: , Rfl:     Emollient (CERAVE) CREA, Apply topically, Disp: , Rfl:     ferrous sulfate 325 (65 Fe) mg tablet, Take 325 mg by mouth daily with breakfast, Disp: , Rfl:     fluticasone (FLONASE) 50 mcg/act nasal spray, 1 spray into each nostril daily, Disp: , Rfl:     furosemide (LASIX) 40 mg tablet, Take 40 mg by mouth daily Give 1 5 tablet orally one time a day, Disp: , Rfl:     loperamide (IMODIUM) 2 mg capsule, Take by mouth, Disp: , Rfl:     metoprolol succinate (TOPROL-XL) 50 mg 24 hr tablet, Take 1 tablet by mouth daily, Disp: , Rfl:     pantoprazole (PROTONIX) 40 mg tablet, Take 1 tablet by mouth daily, Disp: , Rfl:     Saline 0 65 % SOLN, into each nostril, Disp: , Rfl:     Skin Protectants, Misc   (CALAZIME SKIN PROTECTANT) PSTE, Apply topically, Disp: , Rfl:     warfarin (COUMADIN) 2 5 mg tablet, Take by mouth, Disp: , Rfl:     warfarin (COUMADIN) 5 mg tablet, , Disp: , Rfl:     guaiFENesin (ROBITUSSIN) 100 MG/5ML oral liquid, Take 200 mg by mouth 3 (three) times a day as needed for cough, Disp: , Rfl:     Zkqcx-Haxso-Bcuqahb-Pramoxine (TRIPLE ANTIBIOTIC PLUS) 1 % OINT, Apply topically, Disp: , Rfl:     potassium chloride (K-DUR,KLOR-CON) 20 mEq tablet, Take 1 tablet by mouth every 12 (twelve) hours, Disp: , Rfl:   Allergies   Allergen Reactions    Meperidine      Demerol     Xarelto [Rivaroxaban]      Internal bleeding         Labs:     Chemistry        Component Value Date/Time     04/26/2014 0636    K 3 5 04/26/2014 0636     (H) 04/26/2014 0636    CO2 25 04/26/2014 0636    BUN 42 (H) 04/26/2014 0636    CREATININE 1 04 04/26/2014 0636        Component Value Date/Time    CALCIUM 8 4 04/26/2014 0636    ALKPHOS 103 04/21/2014 0510    AST 61 (H) 04/21/2014 0510    ALT 40 04/21/2014 0510 BILITOT 0 78 04/21/2014 0510            Lab Results   Component Value Date    CHOL 192 11/15/2013     Lab Results   Component Value Date    HDL 70 11/15/2013     Lab Results   Component Value Date    LDLCALC 108 11/15/2013     Lab Results   Component Value Date    TRIG 69 11/15/2013     No results found for: CHOLHDL    Imaging: No results found  EKG: Atrial Fibrillation with Ventricular Pacing  Review of Systems   Constitution: Negative  HENT: Negative  Eyes: Negative  Cardiovascular: Positive for leg swelling  Respiratory: Negative  Endocrine: Negative  Hematologic/Lymphatic: Negative  Skin: Negative  Musculoskeletal: Negative  Gastrointestinal: Positive for change in bowel habit  Genitourinary: Negative  Neurological: Negative  Psychiatric/Behavioral: Negative  Allergic/Immunologic: Negative  Vitals:    01/23/20 1057   BP: 128/60   Pulse: 62           Physical Exam   Constitutional: She is oriented to person, place, and time  No distress  HENT:   Mouth/Throat: No oropharyngeal exudate  Eyes: No scleral icterus  Neck: No JVD present  Cardiovascular: Normal rate  An irregularly irregular rhythm present  Pulmonary/Chest: Effort normal and breath sounds normal  No stridor  No respiratory distress  She has no wheezes  Abdominal: Soft  Bowel sounds are normal  She exhibits no distension  There is no tenderness  There is no guarding  Musculoskeletal: She exhibits edema  Neurological: She is alert and oriented to person, place, and time  Skin: Skin is warm and dry  She is not diaphoretic  Discussion/Summary:    Persistent AF: Continue Metoprolol and anticoagulation with coumadin  Her last pacemaker check was fine  She is predominately Ventricular paced  INRs r stable       Continue pacemaker checks  100% V paced       Continue Furosemide for LE edema  I increased to 80mg daily and check BMP two weeks         The patient was counseled regarding diagnostic results, instructions for management, risk factor reductions, impressions  total time of encounter was 25 minutes and 15 minutes was spent counseling

## 2020-02-03 NOTE — PROGRESS NOTES
2/3/20, tc to pt, inr not kdrawn today, will be done 2/4  Faxed to [facility] nurse, reese current diose, inr due tomorrow   hever

## 2020-02-04 ENCOUNTER — ANTICOAG VISIT (OUTPATIENT)
Dept: CARDIOLOGY CLINIC | Facility: CLINIC | Age: 85
End: 2020-02-04

## 2020-02-04 LAB — INR PPP: 2.2 (ref 0.84–1.19)

## 2020-03-03 ENCOUNTER — ANTICOAG VISIT (OUTPATIENT)
Dept: CARDIOLOGY CLINIC | Facility: CLINIC | Age: 85
End: 2020-03-03

## 2020-03-03 LAB — INR PPP: 2.2 (ref 0.84–1.19)

## 2020-03-03 NOTE — PROGRESS NOTES
3/3/20, tc to alphonso at [facility], will continue current dose, inr due 4 weeks  Faxed to [address] [facility]   hever

## 2020-03-18 ENCOUNTER — IN-CLINIC DEVICE VISIT (OUTPATIENT)
Dept: CARDIOLOGY CLINIC | Facility: CLINIC | Age: 85
End: 2020-03-18
Payer: MEDICARE

## 2020-03-18 DIAGNOSIS — Z95.0 PRESENCE OF CARDIAC PACEMAKER: Primary | ICD-10-CM

## 2020-03-18 PROCEDURE — 93279 PRGRMG DEV EVAL PM/LDLS PM: CPT | Performed by: INTERNAL MEDICINE

## 2020-03-18 NOTE — PROGRESS NOTES
Results for orders placed or performed in visit on 03/18/20   Cardiac EP device report    Narrative    BSC DUAL PPM (VVIR)/ NOT MRI CONDITIONAL  [facility]: BATTERY VOLTAGE ADEQUATE (2 YRS)  : 100% (>40%~CHB)  ALL LEAD PARAMETERS WITHIN NORMAL LIMITS  1 NONSUSTV EPISODE W/ EGRAM SHOWING 8 BEATS @ 166 BPM   PT TAKES WARFARIN, METOPROLOL SUCC  EF: 60% (ECHO 6/16/14)  NO PROGRAMMING CHANGES MADE TO DEVICE PARAMETERS  PACEMAKER FUNCTIONING APPROPRIATELY    [address] [place] [address] Street

## 2020-03-31 ENCOUNTER — ANTICOAG VISIT (OUTPATIENT)
Dept: CARDIOLOGY CLINIC | Facility: CLINIC | Age: 85
End: 2020-03-31

## 2020-03-31 LAB — INR PPP: 1.6 (ref 0.84–1.19)

## 2020-03-31 NOTE — PROGRESS NOTES
3/31/20, tc to [address], will increase 5 mg t/th/sat/sun, 2 5 mg other days of the week, inr due 1 week  Faxed to nurse

## 2020-04-07 ENCOUNTER — ANTICOAG VISIT (OUTPATIENT)
Dept: CARDIOLOGY CLINIC | Facility: CLINIC | Age: 85
End: 2020-04-07

## 2020-04-07 LAB — INR PPP: 2.3 (ref 0.84–1.19)

## 2020-04-15 ENCOUNTER — ANTICOAG VISIT (OUTPATIENT)
Dept: CARDIOLOGY CLINIC | Facility: CLINIC | Age: 85
End: 2020-04-15

## 2020-04-15 LAB — INR PPP: 2.2 (ref 0.84–1.19)

## 2020-04-28 ENCOUNTER — ANTICOAG VISIT (OUTPATIENT)
Dept: CARDIOLOGY CLINIC | Facility: CLINIC | Age: 85
End: 2020-04-28

## 2020-04-28 LAB — INR PPP: 2.5 (ref 0.84–1.19)

## 2020-05-12 ENCOUNTER — ANTICOAG VISIT (OUTPATIENT)
Dept: CARDIOLOGY CLINIC | Facility: CLINIC | Age: 85
End: 2020-05-12

## 2020-05-12 LAB — INR PPP: 2.8 (ref 0.84–1.19)

## 2020-05-26 ENCOUNTER — ANTICOAG VISIT (OUTPATIENT)
Dept: CARDIOLOGY CLINIC | Facility: CLINIC | Age: 85
End: 2020-05-26

## 2020-05-26 LAB — INR PPP: 2 (ref 0.84–1.19)

## 2020-06-16 ENCOUNTER — ANTICOAG VISIT (OUTPATIENT)
Dept: CARDIOLOGY CLINIC | Facility: CLINIC | Age: 85
End: 2020-06-16

## 2020-06-16 LAB — INR PPP: 2.2 (ref 0.84–1.19)

## 2020-06-30 ENCOUNTER — REMOTE DEVICE CLINIC VISIT (OUTPATIENT)
Dept: CARDIOLOGY CLINIC | Facility: CLINIC | Age: 85
End: 2020-06-30
Payer: MEDICARE

## 2020-06-30 DIAGNOSIS — Z95.0 PRESENCE OF PERMANENT CARDIAC PACEMAKER: Primary | ICD-10-CM

## 2020-06-30 PROCEDURE — 93294 REM INTERROG EVL PM/LDLS PM: CPT | Performed by: INTERNAL MEDICINE

## 2020-06-30 PROCEDURE — 93296 REM INTERROG EVL PM/IDS: CPT | Performed by: INTERNAL MEDICINE

## 2020-07-14 ENCOUNTER — ANTICOAG VISIT (OUTPATIENT)
Dept: CARDIOLOGY CLINIC | Facility: CLINIC | Age: 85
End: 2020-07-14

## 2020-07-14 LAB — INR PPP: 1.7 (ref 0.84–1.19)

## 2020-07-14 NOTE — PROGRESS NOTES
Called LM and faxed to [facility]  5 mg today they normal dosing recheck in 2 weeks

## 2020-07-28 ENCOUNTER — ANTICOAG VISIT (OUTPATIENT)
Dept: CARDIOLOGY CLINIC | Facility: CLINIC | Age: 85
End: 2020-07-28

## 2020-07-28 LAB — INR PPP: 1.9 (ref 0.84–1.19)

## 2020-07-28 NOTE — PROGRESS NOTES
Tc to [facility] nurse, will increase dose, 2 5 mg sun/t/th, 5 mg other days of the week, inr due 1 week   Faxed same to nurse

## 2020-08-05 ENCOUNTER — ANTICOAG VISIT (OUTPATIENT)
Dept: CARDIOLOGY CLINIC | Facility: CLINIC | Age: 85
End: 2020-08-05

## 2020-08-05 LAB — INR PPP: 2.1 (ref 0.84–1.19)

## 2020-08-05 NOTE — PROGRESS NOTES
Tc to [address] at [employer], lm nurses line, will fax instruction to continue current dose, inr due 2 weeks 
no

## 2020-09-02 ENCOUNTER — TELEPHONE (OUTPATIENT)
Dept: CARDIOLOGY CLINIC | Facility: CLINIC | Age: 85
End: 2020-09-02

## 2020-09-02 NOTE — TELEPHONE ENCOUNTER
Tc to [address] [facility], spoke with nurse, Hernandez Landis  Reminded pt is over due for pt/inr testing, they will have pt/inr done tomorrow

## 2020-09-03 ENCOUNTER — ANTICOAG VISIT (OUTPATIENT)
Dept: CARDIOLOGY CLINIC | Facility: CLINIC | Age: 85
End: 2020-09-03

## 2020-09-03 LAB — INR PPP: 2.5 (ref 0.84–1.19)

## 2020-09-03 NOTE — PROGRESS NOTES
Tc to naima valdes, spoke with [facility] nurse, will fax to continue current dose, inr due 4 weeks

## 2020-10-01 ENCOUNTER — ANTICOAG VISIT (OUTPATIENT)
Dept: CARDIOLOGY CLINIC | Facility: CLINIC | Age: 85
End: 2020-10-01

## 2020-10-01 LAB — INR PPP: 2.3 (ref 0.84–1.19)

## 2020-10-29 ENCOUNTER — ANTICOAG VISIT (OUTPATIENT)
Dept: CARDIOLOGY CLINIC | Facility: CLINIC | Age: 85
End: 2020-10-29

## 2020-10-29 LAB — INR PPP: 2.1 (ref 0.84–1.19)

## 2020-11-18 LAB — INR PPP: 2.4 (ref 0.84–1.19)

## 2020-11-25 ENCOUNTER — ANTICOAG VISIT (OUTPATIENT)
Dept: CARDIOLOGY CLINIC | Facility: CLINIC | Age: 85
End: 2020-11-25

## 2020-11-30 ENCOUNTER — REMOTE DEVICE CLINIC VISIT (OUTPATIENT)
Dept: CARDIOLOGY CLINIC | Facility: CLINIC | Age: 85
End: 2020-11-30
Payer: MEDICARE

## 2020-11-30 DIAGNOSIS — Z95.0 PRESENCE OF PERMANENT CARDIAC PACEMAKER: Primary | ICD-10-CM

## 2020-11-30 PROCEDURE — 93294 REM INTERROG EVL PM/LDLS PM: CPT | Performed by: INTERNAL MEDICINE

## 2020-11-30 PROCEDURE — 93296 REM INTERROG EVL PM/IDS: CPT | Performed by: INTERNAL MEDICINE

## 2020-12-23 ENCOUNTER — ANTICOAG VISIT (OUTPATIENT)
Dept: CARDIOLOGY CLINIC | Facility: CLINIC | Age: 85
End: 2020-12-23

## 2020-12-23 LAB — INR PPP: 2.1 (ref 0.84–1.19)

## 2021-01-20 ENCOUNTER — ANTICOAG VISIT (OUTPATIENT)
Dept: CARDIOLOGY CLINIC | Facility: CLINIC | Age: 86
End: 2021-01-20

## 2021-01-20 LAB — INR PPP: 2.1 (ref 0.84–1.19)

## 2021-01-20 NOTE — PROGRESS NOTES
Tc to [facility] to [facility] nurse, left message, will fax to continue current dose, inr due 4 weeks

## 2021-02-17 ENCOUNTER — ANTICOAG VISIT (OUTPATIENT)
Dept: CARDIOLOGY CLINIC | Facility: CLINIC | Age: 86
End: 2021-02-17

## 2021-02-17 LAB — INR PPP: 2.5 (ref 0.84–1.19)

## 2021-02-17 NOTE — PROGRESS NOTES
Tc to [facility], left message for [facility] nurse answering machine, will fax to continue current dose, inr due 4 weeks  Faxed same

## 2021-03-15 ENCOUNTER — REMOTE DEVICE CLINIC VISIT (OUTPATIENT)
Dept: CARDIOLOGY CLINIC | Facility: CLINIC | Age: 86
End: 2021-03-15
Payer: MEDICARE

## 2021-03-15 DIAGNOSIS — Z95.0 CARDIAC PACEMAKER IN SITU: Primary | ICD-10-CM

## 2021-03-15 PROCEDURE — 93296 REM INTERROG EVL PM/IDS: CPT | Performed by: INTERNAL MEDICINE

## 2021-03-15 PROCEDURE — 93294 REM INTERROG EVL PM/LDLS PM: CPT | Performed by: INTERNAL MEDICINE

## 2021-03-16 NOTE — PROGRESS NOTES
Results for orders placed or performed in visit on 03/15/21   Cardiac EP device report    Narrative    BSC DUAL PPM (VVIR)/ NOT MRI CONDITIONAL  LATITUDE TRANSMISSION: BATTERY VOLTAGE NEARING IZZY 10 MONS  WILL SCHEDULE MONTHLY BATTERY CHECKS   100%  ALL AVAILABLE LEAD PARAMETERS WITHIN NORMAL LIMITS  2 VHRS NOTED; AVAIL EGRAMS PRESENT AS NSVT  PT ON METO SUCC & WARFARIN  EF 60% (2014)  NORMAL DEVICE FUNCTION  NC     Current Outpatient Medications   Medication Sig Dispense Refill    acetaminophen (TYLENOL) 325 mg tablet Take by mouth every 6 (six) hours as needed        cyanocobalamin (VITAMIN B-12) 500 mcg tablet Take by mouth      Dextran 70-Hypromellose (ARTIFICIAL TEARS) 0 1-0 3 % SOLN Apply to eye      Emollient (CERAVE) CREA Apply topically      ferrous sulfate 325 (65 Fe) mg tablet Take 325 mg by mouth daily with breakfast      fluticasone (FLONASE) 50 mcg/act nasal spray 1 spray into each nostril daily      furosemide (LASIX) 80 mg tablet Take 1 tablet (80 mg total) by mouth daily Give 1 5 tablet orally one time a day 90 tablet 3    guaiFENesin (ROBITUSSIN) 100 MG/5ML oral liquid Take 200 mg by mouth 3 (three) times a day as needed for cough      loperamide (IMODIUM) 2 mg capsule Take by mouth      metoprolol succinate (TOPROL-XL) 50 mg 24 hr tablet Take 1 tablet by mouth daily      Jptlz-Cjqjs-Urgujll-Pramoxine (TRIPLE ANTIBIOTIC PLUS) 1 % OINT Apply topically      pantoprazole (PROTONIX) 40 mg tablet Take 1 tablet by mouth daily      potassium chloride (K-DUR,KLOR-CON) 20 mEq tablet Take 1 tablet by mouth every 12 (twelve) hours      Saline 0 65 % SOLN into each nostril      Skin Protectants, Misc  (CALAZIME SKIN PROTECTANT) PSTE Apply topically      warfarin (COUMADIN) 2 5 mg tablet Take by mouth      warfarin (COUMADIN) 5 mg tablet        No current facility-administered medications for this visit

## 2021-03-17 ENCOUNTER — ANTICOAG VISIT (OUTPATIENT)
Dept: CARDIOLOGY CLINIC | Facility: CLINIC | Age: 86
End: 2021-03-17

## 2021-03-17 LAB — INR PPP: 2.5 (ref 0.84–1.19)

## 2021-03-17 NOTE — PROGRESS NOTES
Tc to [facility] nurse, denies any medication changes or diet changes, denies bleeding  Will continue current dose, inr due 4 weeks  Faxed same to nurse

## 2021-04-13 ENCOUNTER — ANTICOAG VISIT (OUTPATIENT)
Dept: CARDIOLOGY CLINIC | Facility: CLINIC | Age: 86
End: 2021-04-13

## 2021-04-13 LAB — INR PPP: 2.1 (ref 0.84–1.19)

## 2021-05-11 ENCOUNTER — ANTICOAG VISIT (OUTPATIENT)
Dept: CARDIOLOGY CLINIC | Facility: CLINIC | Age: 86
End: 2021-05-11

## 2021-05-11 LAB — INR PPP: 2.5 (ref 0.84–1.19)

## 2021-05-11 NOTE — PROGRESS NOTES
PC to nurse at [facility]  Patient INR is 2 5    Advised to continue same dose of warfarin and have another INR in one month   migue

## 2021-06-10 LAB — INR PPP: 2.3 (ref 0.84–1.19)

## 2021-06-11 ENCOUNTER — ANTICOAG VISIT (OUTPATIENT)
Dept: CARDIOLOGY CLINIC | Facility: CLINIC | Age: 86
End: 2021-06-11

## 2021-06-11 NOTE — PROGRESS NOTES
Tc to [address], spoke with Allyson Armstrong, will fax to continue current dose, inr due 4 weeks  Faxed same to [address] [facility]

## 2021-06-15 ENCOUNTER — REMOTE DEVICE CLINIC VISIT (OUTPATIENT)
Dept: CARDIOLOGY CLINIC | Facility: CLINIC | Age: 86
End: 2021-06-15
Payer: MEDICARE

## 2021-06-15 DIAGNOSIS — Z95.0 CARDIAC PACEMAKER IN SITU: Primary | ICD-10-CM

## 2021-06-15 PROCEDURE — 93296 REM INTERROG EVL PM/IDS: CPT | Performed by: INTERNAL MEDICINE

## 2021-06-15 PROCEDURE — 93294 REM INTERROG EVL PM/LDLS PM: CPT | Performed by: INTERNAL MEDICINE

## 2021-06-15 NOTE — PROGRESS NOTES
Results for orders placed or performed in visit on 06/15/21   Cardiac EP device report    Narrative    BSC DUAL PPM (VVIR)/ NOT MRI CONDITIONAL  LATITUDE TRANSMISSION: BATTERY VOLTAGE NEARING IZZY (~ 7 MOS)  WILL SCHEDULE MONTHLY BATTERY CHECKS   100% (VVI6 60)  ALL AVAILABLE LEAD PARAMETERS WITHIN NORMAL LIMITS  NO NEW SIGNIFICANT HIGH RATE EPISODES  HX: CHRONIC AF & PT ON WARFARIN, METOPROLOL SUCC  NORMAL DEVICE FUNCTION     ES

## 2021-07-08 LAB — INR PPP: 2.4 (ref 0.84–1.19)

## 2021-07-12 ENCOUNTER — ANTICOAG VISIT (OUTPATIENT)
Dept: CARDIOLOGY CLINIC | Facility: CLINIC | Age: 86
End: 2021-07-12

## 2021-07-12 NOTE — PROGRESS NOTES
Tc to [place] at [facility] living  Faxed to continue current dose, inr due 4 weeks

## 2021-07-16 ENCOUNTER — REMOTE DEVICE CLINIC VISIT (OUTPATIENT)
Dept: CARDIOLOGY CLINIC | Facility: CLINIC | Age: 86
End: 2021-07-16

## 2021-07-16 ENCOUNTER — TELEPHONE (OUTPATIENT)
Dept: CARDIOLOGY CLINIC | Facility: CLINIC | Age: 86
End: 2021-07-16

## 2021-07-16 DIAGNOSIS — Z95.0 CARDIAC PACEMAKER IN SITU: Primary | ICD-10-CM

## 2021-07-16 PROCEDURE — RECHECK: Performed by: INTERNAL MEDICINE

## 2021-07-16 NOTE — PROGRESS NOTES
Results for orders placed or performed in visit on 07/16/21   Cardiac EP device report    Narrative    BSC DUAL PPM (VVIR)/ NOT MRI CONDITIONAL  LATITUDE TRANSMISSION TO CHECK BATTERY STATUS- NB: BATTERY VOLTAGE NEARING IZZY (5 MOS)  WILL SCHEDULE MONTHLY BATTERY CHECKS  -100% (DEPENDENT/CHB)  ALL AVAILABLE LEAD PARAMETERS WITHIN NORMAL LIMITS  NO NEW SIGNIFICANT HIGH RATE EPISODES  NORMAL DEVICE FUNCTION   GV

## 2021-07-16 NOTE — TELEPHONE ENCOUNTER
Pt called with concerns re: nearing end of battery life and PM     Pt states she is fatigued and SOB w/ exertion which is new for her  She states she feels this is related to her PM function  Pt does have appt w/ Dr Vora Rising on Monday currently - wanted to make device aware as she has check scheduled for today

## 2021-07-19 ENCOUNTER — OFFICE VISIT (OUTPATIENT)
Dept: CARDIOLOGY CLINIC | Facility: CLINIC | Age: 86
End: 2021-07-19
Payer: MEDICARE

## 2021-07-19 VITALS
BODY MASS INDEX: 24.74 KG/M2 | WEIGHT: 126 LBS | HEART RATE: 60 BPM | HEIGHT: 60 IN | DIASTOLIC BLOOD PRESSURE: 62 MMHG | SYSTOLIC BLOOD PRESSURE: 124 MMHG

## 2021-07-19 DIAGNOSIS — I50.32 CHRONIC DIASTOLIC CHF (CONGESTIVE HEART FAILURE) (HCC): ICD-10-CM

## 2021-07-19 DIAGNOSIS — I48.20 CHRONIC ATRIAL FIBRILLATION (HCC): Primary | ICD-10-CM

## 2021-07-19 PROCEDURE — 99214 OFFICE O/P EST MOD 30 MIN: CPT | Performed by: INTERNAL MEDICINE

## 2021-07-19 NOTE — PROGRESS NOTES
Cardiology Follow Up    Riccardo Lindsay  2/28/1923  [address] [facility] Rizwan Mccollum [facility] [address] [phone]    1  Chronic atrial fibrillation (HCC)  POCT ECG       Interval History: Followup for afib    Doing well  No chest pain or dyspnea  INRs stable  Battery low on ppm  On monthly checks now  Medical Problems         No past medical history on file  Social History     Socioeconomic History    Marital status:      Spouse name: Not on file    Number of children: Not on file    Years of education: Not on file    Highest education level: Not on file   Occupational History    Not on file   Tobacco Use    Smoking status: Not on file   Substance and Sexual Activity    Alcohol use: Not on file    Drug use: Not on file    Sexual activity: Not on file   Other Topics Concern    Not on file   Social History Narrative    Not on file     Social Determinants of Health     Financial Resource Strain:     Difficulty of Paying Living Expenses:    Food Insecurity:     Worried About Running Out of Food in the Last Year:     [address] in the Last Year:    Transportation Needs:     Lack of Transportation (Medical):  Lack of Transportation (Non-Medical):    Physical Activity:     Days of Exercise per Week:     Minutes of Exercise per Session:    Stress:     Feeling of Stress :    Social Connections:     Frequency of Communication with Friends and Family:     Frequency of Social Gatherings with Friends and Family:     Attends Mu-ism Services:     Active Member of Clubs or Organizations:     Attends Club or Organization Meetings:     Marital Status:    Intimate Partner Violence:     Fear of Current or Ex-Partner:     Emotionally Abused:     Physically Abused:     Sexually Abused:       No family history on file  No past surgical history on file      Current Outpatient Medications:     acetaminophen (TYLENOL) 325 mg tablet, Take by mouth every 6 (six) hours as needed  , Disp: , Rfl:     cyanocobalamin (VITAMIN B-12) 500 mcg tablet, Take by mouth, Disp: , Rfl:     Dextran 70-Hypromellose (ARTIFICIAL TEARS) 0 1-0 3 % SOLN, Apply to eye, Disp: , Rfl:     Emollient (CERAVE) CREA, Apply topically, Disp: , Rfl:     ferrous sulfate 325 (65 Fe) mg tablet, Take 325 mg by mouth daily with breakfast, Disp: , Rfl:     fluticasone (FLONASE) 50 mcg/act nasal spray, 1 spray into each nostril daily, Disp: , Rfl:     furosemide (LASIX) 80 mg tablet, Take 1 tablet (80 mg total) by mouth daily Give 1 5 tablet orally one time a day, Disp: 90 tablet, Rfl: 3    guaiFENesin (ROBITUSSIN) 100 MG/5ML oral liquid, Take 200 mg by mouth 3 (three) times a day as needed for cough, Disp: , Rfl:     loperamide (IMODIUM) 2 mg capsule, Take by mouth, Disp: , Rfl:     metoprolol succinate (TOPROL-XL) 50 mg 24 hr tablet, Take 1 tablet by mouth daily, Disp: , Rfl:     Vhitk-Tguao-Jspyhif-Pramoxine (TRIPLE ANTIBIOTIC PLUS) 1 % OINT, Apply topically, Disp: , Rfl:     pantoprazole (PROTONIX) 40 mg tablet, Take 1 tablet by mouth daily, Disp: , Rfl:     potassium chloride (K-DUR,KLOR-CON) 20 mEq tablet, Take 1 tablet by mouth every 12 (twelve) hours, Disp: , Rfl:     Saline 0 65 % SOLN, into each nostril, Disp: , Rfl:     Skin Protectants, Misc   (CALAZIME SKIN PROTECTANT) PSTE, Apply topically, Disp: , Rfl:     warfarin (COUMADIN) 2 5 mg tablet, Take by mouth, Disp: , Rfl:     warfarin (COUMADIN) 5 mg tablet, , Disp: , Rfl:   Allergies   Allergen Reactions    Meperidine      Demerol     Xarelto [Rivaroxaban]      Internal bleeding         Labs:     Chemistry        Component Value Date/Time     04/26/2014 0636    K 3 5 04/26/2014 0636     (H) 04/26/2014 0636    CO2 25 04/26/2014 0636    BUN 42 (H) 04/26/2014 0636    CREATININE 1 04 04/26/2014 0636        Component Value Date/Time    CALCIUM 8 4 04/26/2014 0636    ALKPHOS 103 04/21/2014 0510    AST 61 (H) 04/21/2014 0510    ALT 40 04/21/2014 0510    BILITOT 0 78 04/21/2014 0510            Lab Results   Component Value Date    CHOL 192 11/15/2013     Lab Results   Component Value Date    HDL 70 11/15/2013     Lab Results   Component Value Date    LDLCALC 108 11/15/2013     Lab Results   Component Value Date    TRIG 69 11/15/2013     No results found for: CHOLHDL    Imaging: Cardiac EP device report    Result Date: 7/16/2021  Narrative: BSC DUAL PPM (VVIR)/ NOT MRI CONDITIONAL LATITUDE TRANSMISSION TO CHECK BATTERY STATUS- NB: BATTERY VOLTAGE NEARING IZZY (5 MOS)  WILL SCHEDULE MONTHLY BATTERY CHECKS  -100% (DEPENDENT/CHB)  ALL AVAILABLE LEAD PARAMETERS WITHIN NORMAL LIMITS  NO NEW SIGNIFICANT HIGH RATE EPISODES  NORMAL DEVICE FUNCTION  GV       EKG: Atrial Fibrillation with V pacing     Review of Systems   Constitutional: Negative  HENT: Negative  Eyes: Negative  Cardiovascular: Negative  Respiratory: Negative  Endocrine: Negative  Hematologic/Lymphatic: Negative  Skin: Negative  Musculoskeletal: Negative  Gastrointestinal: Negative  Genitourinary: Negative  Neurological: Negative  Psychiatric/Behavioral: Negative  Allergic/Immunologic: Negative  Vitals:    07/19/21 1002   BP: 124/62   Pulse: 60           Physical Exam  Vitals and nursing note reviewed  Constitutional:       Appearance: Normal appearance  HENT:      Head: Normocephalic  Nose: Nose normal       Mouth/Throat:      Mouth: Mucous membranes are moist    Eyes:      General: No scleral icterus  Conjunctiva/sclera: Conjunctivae normal    Cardiovascular:      Rate and Rhythm: Normal rate and regular rhythm  Heart sounds: No murmur heard  No gallop  Pulmonary:      Effort: Pulmonary effort is normal  No respiratory distress  Breath sounds: Normal breath sounds  No wheezing or rales     Abdominal:      General: Abdomen is flat  Bowel sounds are normal  There is no distension  Palpations: Abdomen is soft  Tenderness: There is no abdominal tenderness  There is no guarding  Musculoskeletal:      Cervical back: Normal range of motion and neck supple  Right lower leg: No edema  Left lower leg: No edema  Skin:     General: Skin is warm and dry  Neurological:      General: No focal deficit present  Mental Status: She is alert and oriented to person, place, and time  Psychiatric:         Mood and Affect: Mood normal          Behavior: Behavior normal          Discussion/Summary:    Persistent AF: Continue Metoprolol and anticoagulation with coumadin  Her last pacemaker check was fine  She is predominately Ventricular paced  INRs r stable  Continue ppm checks monthly re: lower battery  And gen change       Continue pacemaker checks  100% V paced  Chronic Diastolic CHF: Continue lasix as ordered  Check bmp and cbc       The patient was counseled regarding diagnostic results, instructions for management, risk factor reductions, impressions  total time of encounter was 25 minutes and 15 minutes was spent counseling

## 2021-07-20 PROCEDURE — 93000 ELECTROCARDIOGRAM COMPLETE: CPT | Performed by: INTERNAL MEDICINE

## 2021-08-05 ENCOUNTER — ANTICOAG VISIT (OUTPATIENT)
Dept: CARDIOLOGY CLINIC | Facility: CLINIC | Age: 86
End: 2021-08-05

## 2021-08-05 LAB — INR PPP: 2.3 (ref 0.84–1.19)

## 2021-08-05 NOTE — PROGRESS NOTES
Tc to [address], spoke with Francene Hamman, will fax to continue current dose , ir due 4 weeks  Faxed to [facility] [facility]

## 2021-08-16 ENCOUNTER — REMOTE DEVICE CLINIC VISIT (OUTPATIENT)
Dept: CARDIOLOGY CLINIC | Facility: CLINIC | Age: 86
End: 2021-08-16

## 2021-08-16 DIAGNOSIS — Z95.0 CARDIAC PACEMAKER IN SITU: Primary | ICD-10-CM

## 2021-08-16 PROCEDURE — RECHECK: Performed by: INTERNAL MEDICINE

## 2021-08-16 NOTE — PROGRESS NOTES
Results for orders placed or performed in visit on 08/16/21   Cardiac EP device report    Narrative    BSC DUAL PPM (VVIR)/ NOT MRI CONDITIONAL  LATITUDE TRANSMISSION TO CHECK BATTERY STATUS- NB: BATTERY VOLTAGE NEARING IZZY (5 MOS)  WILL SCHEDULE MONTHLY BATTERY CHECKS  -100% (DEPENDENT/CHB)  ALL AVAILABLE LEAD PARAMETERS WITHIN NORMAL LIMITS  1 NEW NSVT EPISODE FOR 5 BEATS (1 BEAT UNDERSENSED), AVG CL~330MS  EF-60% (ECHO 2014)  PT ON WARFARIN & METOPROLOL  NORMAL DEVICE FUNCTION   GV

## 2021-09-04 LAB — INR PPP: 2.2 (ref 0.84–1.19)

## 2021-09-08 ENCOUNTER — ANTICOAG VISIT (OUTPATIENT)
Dept: CARDIOLOGY CLINIC | Facility: CLINIC | Age: 86
End: 2021-09-08

## 2021-09-08 NOTE — PROGRESS NOTES
Tc to [facility], spoke with [facility] nurse, Francene Hamman, will continue current dose, inr due 4 weeks  Faxed to [address] [facility]

## 2021-09-15 ENCOUNTER — REMOTE DEVICE CLINIC VISIT (OUTPATIENT)
Dept: CARDIOLOGY CLINIC | Facility: CLINIC | Age: 86
End: 2021-09-15
Payer: MEDICARE

## 2021-09-15 DIAGNOSIS — Z95.0 CARDIAC PACEMAKER IN SITU: Primary | ICD-10-CM

## 2021-09-15 PROCEDURE — 93294 REM INTERROG EVL PM/LDLS PM: CPT | Performed by: INTERNAL MEDICINE

## 2021-09-15 PROCEDURE — 93296 REM INTERROG EVL PM/IDS: CPT | Performed by: INTERNAL MEDICINE

## 2021-09-15 NOTE — PROGRESS NOTES
Results for orders placed or performed in visit on 09/15/21   Cardiac EP device report    Narrative    BSC DUAL PPM (VVIR)/ NOT MRI CONDITIONAL  LATITUDE TRANSMISSION: BATTERY VOLTAGE NEARING IZZY-5 MONS  WILL SCHEDULE MONTHLY BATTERY CHECKS  AP 0%  100%  1 BRIEF NSVT NOTED  PT ON METO SUCC & EF 60% (2014)  NORMAL DEVICE FUNCTION  NC         Current Outpatient Medications:     acetaminophen (TYLENOL) 325 mg tablet, Take by mouth every 6 (six) hours as needed  , Disp: , Rfl:     cyanocobalamin (VITAMIN B-12) 500 mcg tablet, Take by mouth, Disp: , Rfl:     Dextran 70-Hypromellose (ARTIFICIAL TEARS) 0 1-0 3 % SOLN, Apply to eye, Disp: , Rfl:     Emollient (CERAVE) CREA, Apply topically, Disp: , Rfl:     ferrous sulfate 325 (65 Fe) mg tablet, Take 325 mg by mouth daily with breakfast, Disp: , Rfl:     fluticasone (FLONASE) 50 mcg/act nasal spray, 1 spray into each nostril daily, Disp: , Rfl:     furosemide (LASIX) 80 mg tablet, Take 1 tablet (80 mg total) by mouth daily Give 1 5 tablet orally one time a day (Patient taking differently: Take 80 mg by mouth daily Give 1 tablet orally one time a day), Disp: 90 tablet, Rfl: 3    guaiFENesin (ROBITUSSIN) 100 MG/5ML oral liquid, Take 200 mg by mouth 3 (three) times a day as needed for cough (Patient not taking: Reported on 7/19/2021), Disp: , Rfl:     loperamide (IMODIUM) 2 mg capsule, Take by mouth, Disp: , Rfl:     metoprolol succinate (TOPROL-XL) 50 mg 24 hr tablet, Take 1 tablet by mouth daily, Disp: , Rfl:     Wxaki-Igrba-Zvhaytf-Pramoxine (TRIPLE ANTIBIOTIC PLUS) 1 % OINT, Apply topically, Disp: , Rfl:     pantoprazole (PROTONIX) 40 mg tablet, Take 1 tablet by mouth daily, Disp: , Rfl:     potassium chloride (K-DUR,KLOR-CON) 20 mEq tablet, Take 1 tablet by mouth every 12 (twelve) hours, Disp: , Rfl:     Saline 0 65 % SOLN, into each nostril, Disp: , Rfl:     Skin Protectants, Misc   (CALAZIME SKIN PROTECTANT) PSTE, Apply topically, Disp: , Rfl:    warfarin (COUMADIN) 2 5 mg tablet, Take by mouth, Disp: , Rfl:     warfarin (COUMADIN) 5 mg tablet, , Disp: , Rfl:

## 2021-09-30 LAB — INR PPP: 3.9 (ref 0.84–1.19)

## 2021-10-01 ENCOUNTER — ANTICOAG VISIT (OUTPATIENT)
Dept: CARDIOLOGY CLINIC | Facility: CLINIC | Age: 86
End: 2021-10-01

## 2021-10-04 ENCOUNTER — ANTICOAG VISIT (OUTPATIENT)
Dept: CARDIOLOGY CLINIC | Facility: CLINIC | Age: 86
End: 2021-10-04

## 2021-10-04 LAB — INR PPP: 2.2 (ref 0.84–1.19)

## 2021-10-07 ENCOUNTER — ANTICOAG VISIT (OUTPATIENT)
Dept: CARDIOLOGY CLINIC | Facility: CLINIC | Age: 86
End: 2021-10-07

## 2021-10-07 LAB — INR PPP: 1.4 (ref 0.84–1.19)

## 2021-10-11 ENCOUNTER — ANTICOAG VISIT (OUTPATIENT)
Dept: CARDIOLOGY CLINIC | Facility: CLINIC | Age: 86
End: 2021-10-11

## 2021-10-11 ENCOUNTER — TELEPHONE (OUTPATIENT)
Dept: CARDIOLOGY CLINIC | Facility: CLINIC | Age: 86
End: 2021-10-11

## 2021-10-11 LAB — INR PPP: 1.3 (ref 0.84–1.19)

## 2021-10-14 ENCOUNTER — ANTICOAG VISIT (OUTPATIENT)
Dept: CARDIOLOGY CLINIC | Facility: CLINIC | Age: 86
End: 2021-10-14

## 2021-10-14 LAB — INR PPP: 1.8 (ref 0.84–1.19)

## 2021-10-25 LAB — INR PPP: 2.1 (ref 0.84–1.19)

## 2021-10-26 ENCOUNTER — ANTICOAG VISIT (OUTPATIENT)
Dept: CARDIOLOGY CLINIC | Facility: CLINIC | Age: 86
End: 2021-10-26

## 2021-11-09 ENCOUNTER — ANTICOAG VISIT (OUTPATIENT)
Dept: CARDIOLOGY CLINIC | Facility: CLINIC | Age: 86
End: 2021-11-09

## 2021-11-09 LAB — INR PPP: 2 (ref 0.84–1.19)

## 2021-11-30 ENCOUNTER — ANTICOAG VISIT (OUTPATIENT)
Dept: CARDIOLOGY CLINIC | Facility: CLINIC | Age: 86
End: 2021-11-30

## 2021-11-30 LAB — INR PPP: 2.8 (ref 0.84–1.19)

## 2021-12-14 ENCOUNTER — ANTICOAG VISIT (OUTPATIENT)
Dept: CARDIOLOGY CLINIC | Facility: CLINIC | Age: 86
End: 2021-12-14

## 2021-12-14 LAB — INR PPP: 1.1 (ref 0.84–1.19)

## 2021-12-15 ENCOUNTER — REMOTE DEVICE CLINIC VISIT (OUTPATIENT)
Dept: CARDIOLOGY CLINIC | Facility: CLINIC | Age: 86
End: 2021-12-15

## 2021-12-15 DIAGNOSIS — Z95.0 CARDIAC PACEMAKER IN SITU: Primary | ICD-10-CM

## 2021-12-15 PROCEDURE — RECHECK: Performed by: INTERNAL MEDICINE

## 2021-12-21 ENCOUNTER — ANTICOAG VISIT (OUTPATIENT)
Dept: CARDIOLOGY CLINIC | Facility: CLINIC | Age: 86
End: 2021-12-21

## 2021-12-21 LAB — INR PPP: 3 (ref 0.84–1.19)

## 2021-12-28 ENCOUNTER — ANTICOAG VISIT (OUTPATIENT)
Dept: CARDIOLOGY CLINIC | Facility: CLINIC | Age: 86
End: 2021-12-28

## 2021-12-28 LAB — INR PPP: 3.6 (ref 0.84–1.19)

## 2022-01-03 ENCOUNTER — TELEPHONE (OUTPATIENT)
Dept: CARDIOLOGY CLINIC | Facility: CLINIC | Age: 87
End: 2022-01-03

## 2022-01-03 NOTE — TELEPHONE ENCOUNTER
Pt left mgs - states she was told by nursing at [facility] to call re: fluid in legs  Please call pt

## 2022-01-05 NOTE — TELEPHONE ENCOUNTER
Called, spoke to pt  Message relayed as given  Called [facility], spoke to pt's nurse  Message relayed as given  Telephone encounter faxed to Wandy Ruiz @ [phone]

## 2022-01-05 NOTE — TELEPHONE ENCOUNTER
I would have her take a second dose this afternoon  If too late, then just a second dose tomorrow afternoon

## 2022-01-05 NOTE — TELEPHONE ENCOUNTER
Called, spoke to pt  Pt states she has some increased swelling in her legs R > L to her mid-shins  She states she is not SOB and her weights have not been increasing  Pt takes Lasix 80 mg daily  Please advise with any recommendations, thank you

## 2022-01-06 ENCOUNTER — ANTICOAG VISIT (OUTPATIENT)
Dept: CARDIOLOGY CLINIC | Facility: CLINIC | Age: 87
End: 2022-01-06

## 2022-01-06 LAB — INR PPP: 2.4 (ref 0.84–1.19)

## 2022-01-17 ENCOUNTER — REMOTE DEVICE CLINIC VISIT (OUTPATIENT)
Dept: CARDIOLOGY CLINIC | Facility: CLINIC | Age: 87
End: 2022-01-17

## 2022-01-17 DIAGNOSIS — Z95.0 PRESENCE OF CARDIAC PACEMAKER: Primary | ICD-10-CM

## 2022-01-17 PROCEDURE — RECHECK: Performed by: INTERNAL MEDICINE

## 2022-01-17 NOTE — PROGRESS NOTES
Results for orders placed or performed in visit on 01/17/22   Cardiac EP device report    Narrative    BSC DUAL PPM (VVIR)/ NOT MRI CONDITIONAL  NON-BILLABLE LATITUDE TRANSMISSION BATTERY VOLTAGE NEARING IZZY (<3 MOS~MR 90 PPM)  WILL SCHEDULE MONTHLY BATTERY CHECKS  : 99% (>40%~CHB)  ALL AVAILABLE LEAD PARAMETERS WITHIN NORMAL LIMITS  NO SIGNIFICANT HIGH RATE EPISODES  PACEMAKER FUNCTIONING APPROPRIATELY    [address] [place] [address]

## 2022-01-20 ENCOUNTER — ANTICOAG VISIT (OUTPATIENT)
Dept: CARDIOLOGY CLINIC | Facility: CLINIC | Age: 87
End: 2022-01-20

## 2022-01-20 LAB — INR PPP: 2.2 (ref 0.84–1.19)

## 2022-02-09 ENCOUNTER — ANTICOAG VISIT (OUTPATIENT)
Dept: CARDIOLOGY CLINIC | Facility: CLINIC | Age: 87
End: 2022-02-09

## 2022-02-09 LAB — INR PPP: 2.9 (ref 0.84–1.19)

## 2022-02-11 ENCOUNTER — CONSULT (OUTPATIENT)
Dept: CARDIOLOGY CLINIC | Facility: CLINIC | Age: 87
End: 2022-02-11
Payer: MEDICARE

## 2022-02-11 VITALS
HEART RATE: 60 BPM | BODY MASS INDEX: 23.2 KG/M2 | SYSTOLIC BLOOD PRESSURE: 108 MMHG | WEIGHT: 118.2 LBS | HEIGHT: 60 IN | DIASTOLIC BLOOD PRESSURE: 58 MMHG

## 2022-02-11 DIAGNOSIS — I48.20 CHRONIC ATRIAL FIBRILLATION (HCC): Primary | ICD-10-CM

## 2022-02-11 PROCEDURE — 93000 ELECTROCARDIOGRAM COMPLETE: CPT | Performed by: INTERNAL MEDICINE

## 2022-02-11 PROCEDURE — 99204 OFFICE O/P NEW MOD 45 MIN: CPT | Performed by: INTERNAL MEDICINE

## 2022-02-11 RX ORDER — SACCHAROMYCES BOULARDII 250 MG
250 CAPSULE ORAL 2 TIMES DAILY
COMMUNITY

## 2022-02-11 NOTE — PATIENT INSTRUCTIONS
Our office will be in touch with you to schedule the generator change  Please do not eat or drink after midnight on the day of the procedure  Pacemaker Generator Change   WHAT YOU NEED TO KNOW:   What do I need to know about a pacemaker generator change? Your healthcare provider will replace your generator before the battery runs out  The generator may be replaced earlier if it stops working correctly  How do I prepare for a pacemaker generator change? · Your healthcare provider will tell you how to prepare for the procedure  He or she may tell you not to eat or drink anything after midnight on the day of your procedure  Arrange for someone to drive you home and stay with you after the procedure  · Tell your provider about all your current medicines  He or she will tell you if you need to stop any medicine for the procedure, and when to stop  He or she will tell you which medicines to take or not take on the day of the procedure  · Make arrangements at home and work, if needed  You will not be able to lift anything heavy for several days after the procedure  Someone may need to help you around the house during this time  Ask your provider when you can return to work after the procedure  You may need to arrange for time off  What will happen during a pacemaker generator change? · You may be given IV sedation to make you feel calm and relaxed during the procedure  You may also be given local anesthesia to numb the procedure area  With local anesthesia, you may still feel pressure or pushing, but you should not feel pain  · Your healthcare provider will make an incision in your chest  He or she will remove the old generator  He or she will unplug the leads from the generator and inspect them for damage  Your provider will connect the leads to the new generator  He or she will insert the new generator through your incision      · Your incision will be closed with stitches, medical glue, or medical tape  It will be covered with a bandage  What should I expect after a pacemaker generator change? Healthcare providers will monitor your heartbeat  They will also check your pacemaker with a machine to make sure it is working correctly  You may have bruising or pain near your incision  These should get better in a few days  What are the risks of a pacemaker generator change? You may bleed more than expected or get an infection  The leads may move and damage your veins, nerves, wall of your heart, or lungs  You may need to have another procedure to correct the damage or to replace the generator or leads  CARE AGREEMENT:   You have the right to help plan your care  Learn about your health condition and how it may be treated  Discuss treatment options with your healthcare providers to decide what care you want to receive  You always have the right to refuse treatment  The above information is an  only  It is not intended as medical advice for individual conditions or treatments  Talk to your doctor, nurse or pharmacist before following any medical regimen to see if it is safe and effective for you  © Copyright [employer] 2021 Information is for End User's use only and may not be sold, redistributed or otherwise used for commercial purposes   All illustrations and images included in CareNotes® are the copyrighted property of A D A M , Inc  or [address] [place] [address] [employer]

## 2022-02-11 NOTE — PROGRESS NOTES
EPS Consultation/New Patient Evaluation - Isabel Eisenmenger 80 y o  female MRN: [id number]       Referring:Dr Kaushik Mitchell    CC/HPI:   It was a pleasure to see Isabel Eisenmenger in our arrhythmia clinic at [place] [address]  As you know she is a 80 y  o   with chronic diastolic congestive heart failure, GERD, chronic atrial fibrillation on warfarin, complete heart block status post dual-chamber pacemaker placement who presents today to discuss generator change  Per recent device interrogation patient's device has reached RRT on February 6, 2022  She is dependent due to complete heart block  Her device interrogation previously has revealed stable lead parameters  Her mood has been switched to VVIR due to permanent atrial fibrillation  She presents with her son who also provides part of the history  Patient notes having chronic swelling in lower extremities  She otherwise denies any dizziness, lightheadedness, chest pain or palpitations  She does notice heart beating when she exerts herself but she is mostly limited in exertion due to arthritis related to her age  Past Medical History:  No past medical history on file      Medications:      Current Outpatient Medications:     acetaminophen (TYLENOL) 325 mg tablet, Take by mouth every 6 (six) hours as needed  , Disp: , Rfl:     cyanocobalamin (VITAMIN B-12) 500 mcg tablet, Take by mouth, Disp: , Rfl:     Dextran 70-Hypromellose (ARTIFICIAL TEARS) 0 1-0 3 % SOLN, Apply to eye, Disp: , Rfl:     Emollient (CERAVE) CREA, Apply topically, Disp: , Rfl:     ferrous sulfate 325 (65 Fe) mg tablet, Take 325 mg by mouth daily with breakfast, Disp: , Rfl:     furosemide (LASIX) 80 mg tablet, Take 1 tablet (80 mg total) by mouth daily Give 1 5 tablet orally one time a day (Patient taking differently: Take 80 mg by mouth daily Give 1 tablet orally one time a day), Disp: 90 tablet, Rfl: 3    loperamide (IMODIUM) 2 mg capsule, Take by mouth, Disp: , Rfl:     metoprolol succinate (TOPROL-XL) 50 mg 24 hr tablet, Take 1 tablet by mouth daily, Disp: , Rfl:     Sepvo-Zrfdo-Zggnbbc-Pramoxine (TRIPLE ANTIBIOTIC PLUS) 1 % OINT, Apply topically, Disp: , Rfl:     pantoprazole (PROTONIX) 40 mg tablet, Take 1 tablet by mouth daily, Disp: , Rfl:     potassium chloride (K-DUR,KLOR-CON) 20 mEq tablet, Take 1 tablet by mouth every 12 (twelve) hours, Disp: , Rfl:     saccharomyces boulardii (Florastor) 250 mg capsule, Take 250 mg by mouth 2 (two) times a day, Disp: , Rfl:     Saline 0 65 % SOLN, into each nostril, Disp: , Rfl:     Skin Protectants, Misc  (CALAZIME SKIN PROTECTANT) PSTE, Apply topically, Disp: , Rfl:     warfarin (COUMADIN) 2 5 mg tablet, Take by mouth, Disp: , Rfl:     warfarin (COUMADIN) 5 mg tablet, , Disp: , Rfl:     fluticasone (FLONASE) 50 mcg/act nasal spray, 1 spray into each nostril daily (Patient not taking: Reported on 2/11/2022 ), Disp: , Rfl:     guaiFENesin (ROBITUSSIN) 100 MG/5ML oral liquid, Take 200 mg by mouth 3 (three) times a day as needed for cough (Patient not taking: Reported on 2/11/2022 ), Disp: , Rfl:      No family history on file  Social History     Socioeconomic History    Marital status:       Spouse name: Not on file    Number of children: Not on file    Years of education: Not on file    Highest education level: Not on file   Occupational History    Not on file   Tobacco Use    Smoking status: Not on file    Smokeless tobacco: Not on file   Substance and Sexual Activity    Alcohol use: Not on file    Drug use: Not on file    Sexual activity: Not on file   Other Topics Concern    Not on file   Social History Narrative    Not on file     Social Determinants of Health     Financial Resource Strain: Not on file   Food Insecurity: Not on file   Transportation Needs: Not on file   Physical Activity: Not on file   Stress: Not on file   Social Connections: Not on file   Intimate Partner Violence: Not on file Housing Stability: Not on file     Social History     Tobacco Use   Smoking Status Not on file   Smokeless Tobacco Not on file     Social History     Substance and Sexual Activity   Alcohol Use Not on file       Review of Systems   Constitutional: Negative for chills and fever  HENT: Negative  Eyes: Negative for blurred vision and double vision  Cardiovascular: Positive for dyspnea on exertion and leg swelling  Negative for chest pain, near-syncope, orthopnea, palpitations, paroxysmal nocturnal dyspnea and syncope  Respiratory: Negative for cough and sputum production  Endocrine: Negative  Skin: Negative  Negative for rash  Musculoskeletal: Positive for arthritis  Negative for joint pain  Gastrointestinal: Negative for abdominal pain, nausea and vomiting  Genitourinary: Negative  Neurological: Negative  Negative for dizziness and light-headedness  Psychiatric/Behavioral: Negative  The patient is not nervous/anxious  Objective:     Vitals: Blood pressure 108/58, pulse 60, height 5' (1 524 m), weight 53 6 kg (118 lb 3 2 oz)  , Body mass index is 23 08 kg/m²  ,        Physical Exam:    GEN: Britni Huerta appears frail, alert and oriented x 3, pleasant and cooperative   HEENT: pupils equal, round, and reactive to light; extraocular muscles intact  NECK: supple, no carotid bruits   HEART: regular rhythm, normal S1 and S2, no murmurs, clicks, gallops or rubs   LUNGS: clear to auscultation bilaterally; no wheezes, rales, or rhonchi   ABDOMEN: normal bowel sounds, soft, no tenderness, no distention  EXTREMITIES: peripheral pulses normal; no clubbing, cyanosis; +2 edema bilateral  NEURO: no focal findings   SKIN: normal without suspicious lesions on exposed skin      Labs & Results:  Below is the patient's most recent value for Albumin, ALT, AST, BUN, Calcium, Chloride, Cholesterol, CO2, Creatinine, GFR, Glucose, HDL, Hematocrit, Hemoglobin, Hemoglobin A1C, LDL, Magnesium, Phosphorus, Platelets, Potassium, PSA, Sodium, Triglycerides, and WBC  Lab Results   Component Value Date    ALT 40 04/21/2014    AST 61 (H) 04/21/2014    BUN 42 (H) 04/26/2014    CALCIUM 8 4 04/26/2014     (H) 04/26/2014    CHOL 192 11/15/2013    CO2 25 04/26/2014    CREATININE 1 04 04/26/2014    HDL 70 11/15/2013    HCT 25 9 (L) 04/27/2014    HGB 8 4 (L) 04/27/2014    MG 2 0 04/25/2014    PHOS 2 7 04/25/2014     04/27/2014    K 3 5 04/26/2014     04/26/2014    TRIG 69 11/15/2013    WBC 10 05 04/27/2014     Note: for a comprehensive list of the patient's lab results, access the Results Review activity  Cardiac testing:     I personally reviewed the ECG performed in the clinic on 02/11/22  It reveals atrial fibrillation and ventricularly paced rhythm at 60 beats per minute  Echocardiograms:  No results found for this or any previous visit  No results found for this or any previous visit  Catheterizations:   No results found for this or any previous visit  Stress Tests:  No results found for this or any previous visit  Holter monitor -   No results found for this or any previous visit  No results found for this or any previous visit  ASSESSMENT/PLAN:  1  Complete heart block  Status post dual-chamber pacemaker placement  Pacemaker is now at RRT  Discussed generator change in detail including risk and benefits  After answering all the question she opted to proceed with generator change  Our office will be in touch with her son to schedule the procedure  Her device is not MRI conditional therefore will plan for single-chamber pacemaker placement and will cap her atrial lead  If at the time of the procedure device is found to be MRI conditional due to recent approval then will place dual-chamber generator placement  Continue Coumadin    2   Diastolic heart failure  Patient has chronic swelling in lower extremities  Maintained on diuretic with furosemide 80 mg daily  Continue potassium and metoprolol as well    3   Persistent atrial fibrillation  Patient's device mode has been switched to VVIR  Maintained on Coumadin    Follow-up after generator change

## 2022-02-11 NOTE — H&P (VIEW-ONLY)
EPS Consultation/New Patient Evaluation - Allegra Rico 80 y o  female MRN: [id number]       Referring:Dr Winslow Standard    CC/HPI:   It was a pleasure to see Allegra Rico in our arrhythmia clinic at [place] [address]  As you know she is a 80 y  o   with chronic diastolic congestive heart failure, GERD, chronic atrial fibrillation on warfarin, complete heart block status post dual-chamber pacemaker placement who presents today to discuss generator change  Per recent device interrogation patient's device has reached RRT on February 6, 2022  She is dependent due to complete heart block  Her device interrogation previously has revealed stable lead parameters  Her mood has been switched to VVIR due to permanent atrial fibrillation  She presents with her son who also provides part of the history  Patient notes having chronic swelling in lower extremities  She otherwise denies any dizziness, lightheadedness, chest pain or palpitations  She does notice heart beating when she exerts herself but she is mostly limited in exertion due to arthritis related to her age  Past Medical History:  No past medical history on file      Medications:      Current Outpatient Medications:     acetaminophen (TYLENOL) 325 mg tablet, Take by mouth every 6 (six) hours as needed  , Disp: , Rfl:     cyanocobalamin (VITAMIN B-12) 500 mcg tablet, Take by mouth, Disp: , Rfl:     Dextran 70-Hypromellose (ARTIFICIAL TEARS) 0 1-0 3 % SOLN, Apply to eye, Disp: , Rfl:     Emollient (CERAVE) CREA, Apply topically, Disp: , Rfl:     ferrous sulfate 325 (65 Fe) mg tablet, Take 325 mg by mouth daily with breakfast, Disp: , Rfl:     furosemide (LASIX) 80 mg tablet, Take 1 tablet (80 mg total) by mouth daily Give 1 5 tablet orally one time a day (Patient taking differently: Take 80 mg by mouth daily Give 1 tablet orally one time a day), Disp: 90 tablet, Rfl: 3    loperamide (IMODIUM) 2 mg capsule, Take by mouth, Disp: , Rfl:     metoprolol succinate (TOPROL-XL) 50 mg 24 hr tablet, Take 1 tablet by mouth daily, Disp: , Rfl:     Casnq-Itfrk-Weallao-Pramoxine (TRIPLE ANTIBIOTIC PLUS) 1 % OINT, Apply topically, Disp: , Rfl:     pantoprazole (PROTONIX) 40 mg tablet, Take 1 tablet by mouth daily, Disp: , Rfl:     potassium chloride (K-DUR,KLOR-CON) 20 mEq tablet, Take 1 tablet by mouth every 12 (twelve) hours, Disp: , Rfl:     saccharomyces boulardii (Florastor) 250 mg capsule, Take 250 mg by mouth 2 (two) times a day, Disp: , Rfl:     Saline 0 65 % SOLN, into each nostril, Disp: , Rfl:     Skin Protectants, Misc  (CALAZIME SKIN PROTECTANT) PSTE, Apply topically, Disp: , Rfl:     warfarin (COUMADIN) 2 5 mg tablet, Take by mouth, Disp: , Rfl:     warfarin (COUMADIN) 5 mg tablet, , Disp: , Rfl:     fluticasone (FLONASE) 50 mcg/act nasal spray, 1 spray into each nostril daily (Patient not taking: Reported on 2/11/2022 ), Disp: , Rfl:     guaiFENesin (ROBITUSSIN) 100 MG/5ML oral liquid, Take 200 mg by mouth 3 (three) times a day as needed for cough (Patient not taking: Reported on 2/11/2022 ), Disp: , Rfl:      No family history on file  Social History     Socioeconomic History    Marital status:       Spouse name: Not on file    Number of children: Not on file    Years of education: Not on file    Highest education level: Not on file   Occupational History    Not on file   Tobacco Use    Smoking status: Not on file    Smokeless tobacco: Not on file   Substance and Sexual Activity    Alcohol use: Not on file    Drug use: Not on file    Sexual activity: Not on file   Other Topics Concern    Not on file   Social History Narrative    Not on file     Social Determinants of Health     Financial Resource Strain: Not on file   Food Insecurity: Not on file   Transportation Needs: Not on file   Physical Activity: Not on file   Stress: Not on file   Social Connections: Not on file   Intimate Partner Violence: Not on file Housing Stability: Not on file     Social History     Tobacco Use   Smoking Status Not on file   Smokeless Tobacco Not on file     Social History     Substance and Sexual Activity   Alcohol Use Not on file       Review of Systems   Constitutional: Negative for chills and fever  HENT: Negative  Eyes: Negative for blurred vision and double vision  Cardiovascular: Positive for dyspnea on exertion and leg swelling  Negative for chest pain, near-syncope, orthopnea, palpitations, paroxysmal nocturnal dyspnea and syncope  Respiratory: Negative for cough and sputum production  Endocrine: Negative  Skin: Negative  Negative for rash  Musculoskeletal: Positive for arthritis  Negative for joint pain  Gastrointestinal: Negative for abdominal pain, nausea and vomiting  Genitourinary: Negative  Neurological: Negative  Negative for dizziness and light-headedness  Psychiatric/Behavioral: Negative  The patient is not nervous/anxious  Objective:     Vitals: Blood pressure 108/58, pulse 60, height 5' (1 524 m), weight 53 6 kg (118 lb 3 2 oz)  , Body mass index is 23 08 kg/m²  ,        Physical Exam:    GEN: Patience Godinez appears frail, alert and oriented x 3, pleasant and cooperative   HEENT: pupils equal, round, and reactive to light; extraocular muscles intact  NECK: supple, no carotid bruits   HEART: regular rhythm, normal S1 and S2, no murmurs, clicks, gallops or rubs   LUNGS: clear to auscultation bilaterally; no wheezes, rales, or rhonchi   ABDOMEN: normal bowel sounds, soft, no tenderness, no distention  EXTREMITIES: peripheral pulses normal; no clubbing, cyanosis; +2 edema bilateral  NEURO: no focal findings   SKIN: normal without suspicious lesions on exposed skin      Labs & Results:  Below is the patient's most recent value for Albumin, ALT, AST, BUN, Calcium, Chloride, Cholesterol, CO2, Creatinine, GFR, Glucose, HDL, Hematocrit, Hemoglobin, Hemoglobin A1C, LDL, Magnesium, Phosphorus, Platelets, Potassium, PSA, Sodium, Triglycerides, and WBC  Lab Results   Component Value Date    ALT 40 04/21/2014    AST 61 (H) 04/21/2014    BUN 42 (H) 04/26/2014    CALCIUM 8 4 04/26/2014     (H) 04/26/2014    CHOL 192 11/15/2013    CO2 25 04/26/2014    CREATININE 1 04 04/26/2014    HDL 70 11/15/2013    HCT 25 9 (L) 04/27/2014    HGB 8 4 (L) 04/27/2014    MG 2 0 04/25/2014    PHOS 2 7 04/25/2014     04/27/2014    K 3 5 04/26/2014     04/26/2014    TRIG 69 11/15/2013    WBC 10 05 04/27/2014     Note: for a comprehensive list of the patient's lab results, access the Results Review activity  Cardiac testing:     I personally reviewed the ECG performed in the clinic on 02/11/22  It reveals atrial fibrillation and ventricularly paced rhythm at 60 beats per minute  Echocardiograms:  No results found for this or any previous visit  No results found for this or any previous visit  Catheterizations:   No results found for this or any previous visit  Stress Tests:  No results found for this or any previous visit  Holter monitor -   No results found for this or any previous visit  No results found for this or any previous visit  ASSESSMENT/PLAN:  1  Complete heart block  Status post dual-chamber pacemaker placement  Pacemaker is now at RRT  Discussed generator change in detail including risk and benefits  After answering all the question she opted to proceed with generator change  Our office will be in touch with her son to schedule the procedure  Her device is not MRI conditional therefore will plan for single-chamber pacemaker placement and will cap her atrial lead  If at the time of the procedure device is found to be MRI conditional due to recent approval then will place dual-chamber generator placement  Continue Coumadin    2   Diastolic heart failure  Patient has chronic swelling in lower extremities  Maintained on diuretic with furosemide 80 mg daily  Continue potassium and metoprolol as well    3   Persistent atrial fibrillation  Patient's device mode has been switched to VVIR  Maintained on Coumadin    Follow-up after generator change

## 2022-02-14 ENCOUNTER — TELEPHONE (OUTPATIENT)
Dept: CARDIOLOGY CLINIC | Facility: CLINIC | Age: 87
End: 2022-02-14

## 2022-02-14 NOTE — TELEPHONE ENCOUNTER
Pt states:    "If we have something to send her and it has to do with financial information, send it to her daughter   If it has to do with appointments, send it directly to her "    Pt's address:    [address]  [place], [address] [place] [address]

## 2022-02-16 ENCOUNTER — PREP FOR PROCEDURE (OUTPATIENT)
Dept: CARDIOLOGY CLINIC | Facility: CLINIC | Age: 87
End: 2022-02-16

## 2022-02-16 DIAGNOSIS — I48.20 CHRONIC ATRIAL FIBRILLATION (HCC): Primary | ICD-10-CM

## 2022-03-02 ENCOUNTER — TELEPHONE (OUTPATIENT)
Dept: CARDIOLOGY CLINIC | Facility: CLINIC | Age: 87
End: 2022-03-02

## 2022-03-02 ENCOUNTER — ANTICOAG VISIT (OUTPATIENT)
Dept: CARDIOLOGY CLINIC | Facility: CLINIC | Age: 87
End: 2022-03-02

## 2022-03-02 LAB — INR PPP: 2.3 (ref 0.84–1.19)

## 2022-03-02 NOTE — PROGRESS NOTES
Spoke with Romayne Pesa at [facility], advised INR good, will continue same dose, order faxed

## 2022-03-02 NOTE — TELEPHONE ENCOUNTER
Patient scheduled for gen chage on 03/10/22 with Dr Jana Harris  Patient live in at nursing home, instructions faxed, I will call with transportation times

## 2022-03-02 NOTE — TELEPHONE ENCOUNTER
----- Message from Stephane Hannon MD sent at 2/14/2022  4:09 PM EST -----  Regarding: RE: Schedule generator change  Sorry    I have attached patient information to the text message  ----- Message -----  From: Pressley Harada, MA  Sent: 2/14/2022   7:12 AM EST  To: Stephane Hannon MD, Malka Garner  Subject: RE: Schedule generator change                    Good morning  This patient procedure referral doesn't have any patient demographic  Thank  you   ----- Message -----  From: Stephane Hannon MD  Sent: 2/11/2022   5:45 PM EST  To: Pressley Harada, MA, kD Sotelo MA, #  Subject: Schedule generator change                        Adelaida/Mari    Please schedule generator change-single chamber, [employer]  No hold on medications  Routine labsShannon-can you please put in case request?Device team-can you please let me know why his device is not MRI compatible? If it is MRI compatible then I would like to stay with dual-chamber       Thank you

## 2022-03-09 ENCOUNTER — ANESTHESIA EVENT (OUTPATIENT)
Dept: NON INVASIVE DIAGNOSTICS | Facility: HOSPITAL | Age: 87
End: 2022-03-09
Payer: MEDICARE

## 2022-03-10 ENCOUNTER — ANESTHESIA (OUTPATIENT)
Dept: NON INVASIVE DIAGNOSTICS | Facility: HOSPITAL | Age: 87
End: 2022-03-10
Payer: MEDICARE

## 2022-03-10 ENCOUNTER — HOSPITAL ENCOUNTER (OUTPATIENT)
Facility: HOSPITAL | Age: 87
Setting detail: OUTPATIENT SURGERY
Discharge: HOME | End: 2022-03-10
Attending: INTERNAL MEDICINE | Admitting: INTERNAL MEDICINE
Payer: MEDICARE

## 2022-03-10 VITALS
SYSTOLIC BLOOD PRESSURE: 122 MMHG | RESPIRATION RATE: 17 BRPM | WEIGHT: 118.17 LBS | BODY MASS INDEX: 23.2 KG/M2 | HEART RATE: 60 BPM | OXYGEN SATURATION: 96 % | HEIGHT: 60 IN | DIASTOLIC BLOOD PRESSURE: 67 MMHG | TEMPERATURE: 97.4 F

## 2022-03-10 DIAGNOSIS — I50.32 CHRONIC DIASTOLIC CHF (CONGESTIVE HEART FAILURE) (HCC): ICD-10-CM

## 2022-03-10 DIAGNOSIS — Z45.010 PACEMAKER AT END OF BATTERY LIFE: Primary | ICD-10-CM

## 2022-03-10 LAB
ANION GAP SERPL CALCULATED.3IONS-SCNC: 3 MMOL/L (ref 4–13)
ATRIAL RATE: 300 BPM
BUN SERPL-MCNC: 42 MG/DL (ref 5–25)
CALCIUM SERPL-MCNC: 7.8 MG/DL (ref 8.3–10.1)
CHLORIDE SERPL-SCNC: 118 MMOL/L (ref 100–108)
CO2 SERPL-SCNC: 21 MMOL/L (ref 21–32)
CREAT SERPL-MCNC: 1.95 MG/DL (ref 0.6–1.3)
ERYTHROCYTE [DISTWIDTH] IN BLOOD BY AUTOMATED COUNT: 17.5 % (ref 11.6–15.1)
GFR SERPL CREATININE-BSD FRML MDRD: 20 ML/MIN/1.73SQ M
GLUCOSE P FAST SERPL-MCNC: 86 MG/DL (ref 65–99)
GLUCOSE SERPL-MCNC: 86 MG/DL (ref 65–140)
HCT VFR BLD AUTO: 27.8 % (ref 34.8–46.1)
HGB BLD-MCNC: 8.7 G/DL (ref 11.5–15.4)
INR PPP: 1.93 (ref 0.84–1.19)
MCH RBC QN AUTO: 36.3 PG (ref 26.8–34.3)
MCHC RBC AUTO-ENTMCNC: 31.3 G/DL (ref 31.4–37.4)
MCV RBC AUTO: 116 FL (ref 82–98)
PLATELET # BLD AUTO: 190 THOUSANDS/UL (ref 149–390)
PMV BLD AUTO: 10.2 FL (ref 8.9–12.7)
POTASSIUM SERPL-SCNC: 4.5 MMOL/L (ref 3.5–5.3)
PROTHROMBIN TIME: 21.2 SECONDS (ref 11.6–14.5)
QRS AXIS: -89 DEGREES
QRSD INTERVAL: 154 MS
QT INTERVAL: 506 MS
QTC INTERVAL: 506 MS
RBC # BLD AUTO: 2.4 MILLION/UL (ref 3.81–5.12)
SODIUM SERPL-SCNC: 142 MMOL/L (ref 136–145)
T WAVE AXIS: 70 DEGREES
VENTRICULAR RATE: 60 BPM
WBC # BLD AUTO: 4.02 THOUSAND/UL (ref 4.31–10.16)

## 2022-03-10 PROCEDURE — 33227 REMOVE&REPLACE PM GEN SINGL: CPT | Performed by: INTERNAL MEDICINE

## 2022-03-10 PROCEDURE — 93010 ELECTROCARDIOGRAM REPORT: CPT | Performed by: INTERNAL MEDICINE

## 2022-03-10 PROCEDURE — 85610 PROTHROMBIN TIME: CPT | Performed by: PHYSICIAN ASSISTANT

## 2022-03-10 PROCEDURE — 80048 BASIC METABOLIC PNL TOTAL CA: CPT | Performed by: PHYSICIAN ASSISTANT

## 2022-03-10 PROCEDURE — 85027 COMPLETE CBC AUTOMATED: CPT | Performed by: PHYSICIAN ASSISTANT

## 2022-03-10 PROCEDURE — C1786 PMKR, SINGLE, RATE-RESP: HCPCS | Performed by: INTERNAL MEDICINE

## 2022-03-10 PROCEDURE — 93005 ELECTROCARDIOGRAM TRACING: CPT

## 2022-03-10 DEVICE — PACEMAKER
Type: IMPLANTABLE DEVICE | Site: CHEST  WALL | Status: FUNCTIONAL
Brand: ACCOLADE™ MRI SR

## 2022-03-10 DEVICE — ENVELOPE CMRM6122 ABSORB MED MR
Type: IMPLANTABLE DEVICE | Site: CHEST  WALL | Status: FUNCTIONAL
Brand: TYRX™

## 2022-03-10 RX ORDER — PROPOFOL 10 MG/ML
INJECTION, EMULSION INTRAVENOUS CONTINUOUS PRN
Status: DISCONTINUED | OUTPATIENT
Start: 2022-03-10 | End: 2022-03-10

## 2022-03-10 RX ORDER — ACETAMINOPHEN 325 MG/1
650 TABLET ORAL EVERY 4 HOURS PRN
Status: DISCONTINUED | OUTPATIENT
Start: 2022-03-10 | End: 2022-03-10 | Stop reason: HOSPADM

## 2022-03-10 RX ORDER — PROPOFOL 10 MG/ML
INJECTION, EMULSION INTRAVENOUS AS NEEDED
Status: DISCONTINUED | OUTPATIENT
Start: 2022-03-10 | End: 2022-03-10

## 2022-03-10 RX ORDER — LIDOCAINE HYDROCHLORIDE 10 MG/ML
0.5 INJECTION, SOLUTION EPIDURAL; INFILTRATION; INTRACAUDAL; PERINEURAL ONCE AS NEEDED
Status: DISCONTINUED | OUTPATIENT
Start: 2022-03-10 | End: 2022-03-10 | Stop reason: HOSPADM

## 2022-03-10 RX ORDER — SODIUM CHLORIDE, SODIUM LACTATE, POTASSIUM CHLORIDE, CALCIUM CHLORIDE 600; 310; 30; 20 MG/100ML; MG/100ML; MG/100ML; MG/100ML
125 INJECTION, SOLUTION INTRAVENOUS CONTINUOUS
Status: DISCONTINUED | OUTPATIENT
Start: 2022-03-10 | End: 2022-03-10 | Stop reason: HOSPADM

## 2022-03-10 RX ORDER — LIDOCAINE HYDROCHLORIDE 10 MG/ML
INJECTION, SOLUTION EPIDURAL; INFILTRATION; INTRACAUDAL; PERINEURAL AS NEEDED
Status: DISCONTINUED | OUTPATIENT
Start: 2022-03-10 | End: 2022-03-10 | Stop reason: HOSPADM

## 2022-03-10 RX ORDER — CEFAZOLIN SODIUM 2 G/50ML
2000 SOLUTION INTRAVENOUS ONCE
Status: DISCONTINUED | OUTPATIENT
Start: 2022-03-10 | End: 2022-03-10 | Stop reason: HOSPADM

## 2022-03-10 RX ORDER — SODIUM CHLORIDE 9 MG/ML
INJECTION, SOLUTION INTRAVENOUS CONTINUOUS PRN
Status: DISCONTINUED | OUTPATIENT
Start: 2022-03-10 | End: 2022-03-10

## 2022-03-10 RX ORDER — CEFAZOLIN SODIUM 1 G/50ML
SOLUTION INTRAVENOUS AS NEEDED
Status: DISCONTINUED | OUTPATIENT
Start: 2022-03-10 | End: 2022-03-10

## 2022-03-10 RX ADMIN — SODIUM CHLORIDE: 9 INJECTION, SOLUTION INTRAVENOUS at 08:07

## 2022-03-10 RX ADMIN — PROPOFOL 20 MG: 10 INJECTION, EMULSION INTRAVENOUS at 08:21

## 2022-03-10 RX ADMIN — CEFAZOLIN SODIUM 1000 MG: 1 SOLUTION INTRAVENOUS at 08:07

## 2022-03-10 RX ADMIN — PROPOFOL 40 MCG/KG/MIN: 10 INJECTION, EMULSION INTRAVENOUS at 08:07

## 2022-03-10 RX ADMIN — PROPOFOL 30 MG: 10 INJECTION, EMULSION INTRAVENOUS at 08:07

## 2022-03-10 NOTE — ANESTHESIA POSTPROCEDURE EVALUATION
Post-Op Assessment Note    CV Status:  Stable  Pain Score: 0    Pain management: adequate     Mental Status:  Alert and awake   Hydration Status:  Euvolemic   PONV Controlled:  Controlled   Airway Patency:  Patent      Post Op Vitals Reviewed: Yes      Staff: CRNA, Anesthesiologist         No complications documented      BP   94/50   Temp   97 6   Pulse  60   Resp   14   SpO2   99

## 2022-03-10 NOTE — ANESTHESIA PREPROCEDURE EVALUATION
Procedure:  Cardiac pacer generator change- Single Chamber (N/A Chest)    Hx of MI    Pacer with 99%     Echo 2014  EF 60%    Relevant Problems   Other   (+) Chronic diastolic CHF (congestive heart failure) (HCC)        Physical Exam    Airway    Mallampati score: III  TM Distance: >3 FB  Neck ROM: full     Dental   No notable dental hx     Cardiovascular  Rhythm: regular, Rate: normal, Cardiovascular exam normal    Pulmonary  Pulmonary exam normal Breath sounds clear to auscultation,     Other Findings        Anesthesia Plan  ASA Score- 3     Anesthesia Type- IV sedation with anesthesia with ASA Monitors  Additional Monitors:   Airway Plan:     Comment: Discussed risks/benefits, including medication reactions, awareness, aspiration, and serious/life threatening complications  Plan to maintain native airway with IVGA, monitored with EtCO2  Plan Factors-Exercise tolerance (METS): <4 METS  Patient summary reviewed  Patient instructed to abstain from smoking on day of procedure  Patient did not smoke on day of surgery  Induction- intravenous  Postoperative Plan-     Informed Consent- Anesthetic plan and risks discussed with patient  I personally reviewed this patient with the CRNA  Discussed and agreed on the Anesthesia Plan with the CRNA  Anne Jean

## 2022-03-10 NOTE — NURSING NOTE
PT TRANSFERRED TO  [facility] VIA STRETCHER AND MOVED TO BED WITH SMOOTH   [address] SRSG DRY AND INTACT, PT AWAKE AND PAINFREE   PT CARE ASSUMED BY YADY BEAULIEU IN ATTENDANCE AT PT BEDSIDE   [facility] RN

## 2022-03-10 NOTE — DISCHARGE INSTRUCTIONS
Please refer to post pacemaker implantation discharge instructions and restrictions and your pacemaker booklet/temporary card  Keep incision dry for one week  Do not use lotions/powders/creams on incision  Leave outer bandage in place for 1 week - it is water proof, and as long as it is fully adhered to your skin you may shower with it  If it appears as though the bandage is coming off and/or there is any communication to the area of device incision, please then keep the whole area dry for the remaining week  After 1 week, please remove by pulling all edges away from the center of the bandage  Please call the office if you notice redness, swelling, bleeding, or drainage from incision or if you develop fevers  AFTER PACEMAKER CARE:    If you have any questions, please call [phone] to speak with a nurse (8:30am-4pm, or [phone] after hours)  For appointments, please call [phone]  WHAT YOU SHOULD KNOW:   A pacemaker is a small, battery-powered device that is placed under your skin in your upper chest area with wires placed through a vein that lead directly into the heart  It helps regulate your heart rate and prevent your heart from beating too slowly  AFTER YOU LEAVE:     Medicines:     · Pain medicine: You may need medicine to take away or decrease pain  ¨ Learn how to take your medicine  Ask what medicine and how much you should take  Be sure you know how, when, and how often to take it  Usually Over the counter pain medicine is sufficient to control pain (Acetominophen or Ibuprofen) Ask your doctor if you may take these  If this does not control your pain, narcotic pain killers may be prescribed, please call if you need prescription  ¨ Do not wait until the pain is severe before you take your medicine  Tell caregivers if your pain does not decrease  ¨ Pain medicine can make you dizzy or sleepy   Prevent falls by calling someone when you get out of bed or if you need help  Take your medicine as directed  Call your healthcare provider if you think your medicine is not helping or if you have side effects  Tell him if you are allergic to any medicine  Follow up with your cardiologist after your procedure: You will need a follow-up visit approximately 2 weeks after you leave the hospital  Your cardiologist will check your wound and make sure that your pacemaker is working correctly  Follow the instructions to check your pacemaker: Your cardiologist or primary healthcare provider will check your pacemaker and the battery regularly  He will use a computer to check your pacemaker over the telephone or wireless device which will be given to you  Pacemaker batteries usually last 8 to 10 years  The pacemaker unit will be replaced when the battery gets low  This is a simpler procedure than the original one to implant your pacemaker  Wound care:  Keep your incision dry for one week  Do not use lotions/powders/creams on incision  Leave outer bandage in place for 1 week - it is water proof, and as long as it is fully adhered to your skin you may shower with it  If it appears as though the bandage is coming off and/or there is any communication to the area of device incision, please then keep the whole area dry for the remaining week  After 1 week, please remove by pulling all edges away from the center of the bandage  Please call the office if you notice redness, swelling, bleeding, or drainage from incision or if you develop fevers  Activity:   · You may resume your normal activity following a generator change  · Driving: you are able to drive 48 hours post PPM implant   · Sports:  Ask your caregiver when it is okay to play tennis, golf, basketball, or any sport that requires you to lift your arms  Do not play full contact sports, such as football, that could damage your pacemaker   Ask your cardiologist or primary healthcare provider how much and what kinds of physical activity are safe for you  Living with a pacemaker:   · Tell all caregivers you have a pacemaker: This includes surgeons, radiologists, and medical technicians  You may want to wear a medical alert ID bracelet or necklace that states that you have a pacemaker  · Carry your pacemaker ID card: Make sure you receive a pacemaker ID card  Carry it with you at all times  It lists important information about your pacemaker  Show it to airport security if you travel  · Avoid electrical interference:  Avoid welding equipment and other equipment with large magnets or electric fields  These things could interfere with how your pacemaker works  Use your cell phone on the ear opposite from your pacemaker  Do not carry your cell phone in your shirt pocket over your chest      · Some Pacemakers are MRI safe  Ask you doctor if it is safe to proceed with MRI and let the radiologist and staff know you have a pacemaker  · Do not touch the skin around your pacemaker: This can cause damage to the lead wires or move the pacemaker unit from where it should be  Contact your cardiologist or primary healthcare provider if:   · The area around your pacemaker has increasing amount of pain after surgery  The pain should improve over first few days after implantation  · The skin around your stitches has increasing redness, swelling, or has drainage  This may mean that you have an infection  · You have a fever  · You have chills, a cough, and feel weak or achy  These are also signs of infection  · Your feet or ankles are more swollen than your baseline  · Your Heart rate is less than 50 beats per minute     Seek care immediately if:   · Your bandage becomes soaked with blood  · Your pacemaker is swelling rapidly    · Your stitches open up  · You feel your heart suddenly beating very slowly or quickly  · You become too weak or dizzy to stand, or you pass out       · Your arm or leg feels warm, tender, and painful  It may look swollen and red  · You have chest pain that does not go away with rest or medicine  · You feel lightheaded, short of breath, and have chest pain  · You cough up blood  © 2014 [address] is for End User's use only and may not be sold, redistributed or otherwise used for commercial purposes  All illustrations and images included in CareNotes® are the copyrighted property of [employer] , [employer]  or Vern Macdonald  The above information is an  only  It is not intended as medical advice for individual conditions or treatments  Talk to your doctor, nurse or pharmacist before following any medical regimen to see if it is safe and effective for you

## 2022-03-10 NOTE — INTERVAL H&P NOTE
Please see recent consultation with Dr Marcia Lutz for full details  Briefly this patient is a pleasant 59-year-old female with persistent versus permanent atrial fibrillation, complete heart block with dual-chamber [employer] pacemaker in Situ, chronic HFpEF, and GERD  She typically follows with Dr Musa Ford as an outpatient  Through routine device interrogations, she was found to have reached elective replacement indicator 2/6/2022  She was seen by EP to discuss further management, and generator change was recommended  As her device is not compatible and she is persistent versus permanent atrial fibrillation, she will likely be changed to a single-chamber pacemaker with a capped atrial lead  If however, her leads are MRI compatible due to recent approval than a dual-chamber device will be replaced  No significant changes since she was recently seen, physical exam unchanged  Chronic lower extremity edema noted      Vitals:    03/10/22 0706   BP: 125/82   Pulse: 80   Resp: 16   Temp: 98 7 °F (37 1 °C)   SpO2: 100%

## 2022-03-18 ENCOUNTER — ANTICOAG VISIT (OUTPATIENT)
Dept: CARDIOLOGY CLINIC | Facility: CLINIC | Age: 87
End: 2022-03-18

## 2022-03-18 LAB — INR PPP: 1.7 (ref 0.84–1.19)

## 2022-03-18 NOTE — PROGRESS NOTES
Spoke with Kari benson RN, advised INR low, patient did not miss any doses  Will have patient take 5 mg Mon and Fri, 2 5 mg other days   Teddy Colon states next draw date is already scheduled 3/23/22    Instruction order faxed

## 2022-03-23 ENCOUNTER — ANTICOAG VISIT (OUTPATIENT)
Dept: CARDIOLOGY CLINIC | Facility: CLINIC | Age: 87
End: 2022-03-23

## 2022-03-23 ENCOUNTER — IN-CLINIC DEVICE VISIT (OUTPATIENT)
Dept: CARDIOLOGY CLINIC | Facility: CLINIC | Age: 87
End: 2022-03-23

## 2022-03-23 DIAGNOSIS — Z95.0 PRESENCE OF PERMANENT CARDIAC PACEMAKER: Primary | ICD-10-CM

## 2022-03-23 LAB — INR PPP: 2.2 (ref 0.84–1.19)

## 2022-03-23 PROCEDURE — 99024 POSTOP FOLLOW-UP VISIT: CPT | Performed by: INTERNAL MEDICINE

## 2022-03-23 PROCEDURE — 1124F ACP DISCUSS-NO DSCNMKR DOCD: CPT | Performed by: INTERNAL MEDICINE

## 2022-03-23 NOTE — PROGRESS NOTES
Spoke with Gogo Oliva at [facility], advised INR good, will continue same dose 5 mg Mon Fri, 2 5 mg other days    Will recheck in 2 weeks 4/6/22    Physician order faxed

## 2022-03-23 NOTE — PROGRESS NOTES
Results for orders placed or performed in visit on 03/23/22   Cardiac EP device report    Narrative    BSC DUAL PPM (VVIR)/ NOT MRI CONDITIONAL  DEVICE INTERROGATED IN THE [place] OFFICE: WOUND CHECK: INCISION CLEAN AND DRY WITH EDGES APPROXIMATED; WOUND CARE AND RESTRICTIONS REVIEWED WITH PATIENT  BATTERY VOLTAGE ADEQUATE (10 5 YRS)   99% (>40%/VVIR 60/DEPENDENT); ALL LEAD PARAMETERS WITHIN NORMAL LIMITS  NO HIGH RATE EPISODES  HX: CHRONIC AF & PATIENT TAKES WARFARIN, METOPROLOL SUCC  NO PROGRAMMING CHANGES MADE TO DEVICE PARAMETERS  PACEMAKER FUNCTIONING APPROPRIATELY      ES

## 2022-03-25 ENCOUNTER — OFFICE VISIT (OUTPATIENT)
Dept: CARDIOLOGY CLINIC | Facility: CLINIC | Age: 87
End: 2022-03-25
Payer: MEDICARE

## 2022-03-25 VITALS
OXYGEN SATURATION: 99 % | WEIGHT: 129 LBS | HEIGHT: 60 IN | BODY MASS INDEX: 25.32 KG/M2 | DIASTOLIC BLOOD PRESSURE: 60 MMHG | HEART RATE: 60 BPM | SYSTOLIC BLOOD PRESSURE: 110 MMHG

## 2022-03-25 DIAGNOSIS — I50.32 CHRONIC DIASTOLIC CHF (CONGESTIVE HEART FAILURE) (HCC): Primary | ICD-10-CM

## 2022-03-25 PROCEDURE — 99214 OFFICE O/P EST MOD 30 MIN: CPT | Performed by: INTERNAL MEDICINE

## 2022-03-25 RX ORDER — CLOTRIMAZOLE 1 %
CREAM (GRAM) TOPICAL DAILY PRN
COMMUNITY

## 2022-03-25 RX ORDER — POLYETHYLENE GLYCOL 3350 17 G/17G
17 POWDER, FOR SOLUTION ORAL DAILY
COMMUNITY

## 2022-03-25 NOTE — PROGRESS NOTES
Cardiology Follow Up    Lisa Valentine  2/28/1923  [address] [facility] [id number] [phone]    1  Chronic diastolic CHF (congestive heart failure) (HCC)  torsemide 40 MG TABS    Basic metabolic panel       Interval History: Followup chronic diastolic chf and PPM    She had gen change    ++++++ LE edema that is not responding to lasix  + dyspnea on exertion  Medical Problems             Problem List     Chronic diastolic CHF (congestive heart failure) (HCC)    Wt Readings from Last 3 Encounters:   03/25/22 58 5 kg (129 lb)   03/10/22 53 6 kg (118 lb 2 7 oz)   02/11/22 53 6 kg (118 lb 3 2 oz)                 Pacemaker at end of battery life              No past medical history on file  Social History     Socioeconomic History    Marital status:      Spouse name: Not on file    Number of children: Not on file    Years of education: Not on file    Highest education level: Not on file   Occupational History    Not on file   Tobacco Use    Smoking status: Not on file    Smokeless tobacco: Not on file   Substance and Sexual Activity    Alcohol use: Not on file    Drug use: Not on file    Sexual activity: Not on file   Other Topics Concern    Not on file   Social History Narrative    Not on file     Social Determinants of Health     Financial Resource Strain: Not on file   Food Insecurity: Not on file   Transportation Needs: Not on file   Physical Activity: Not on file   Stress: Not on file   Social Connections: Not on file   Intimate Partner Violence: Not on file   Housing Stability: Not on file      No family history on file  Past Surgical History:   Procedure Laterality Date    CARDIAC ELECTROPHYSIOLOGY PROCEDURE N/A 3/10/2022    Procedure: Cardiac pacer generator change- Single Chamber;  Surgeon: Sarah Saldana MD;  Location: [facility];   Service: Cardiology       Current Outpatient Medications:     acetaminophen (TYLENOL) 325 mg tablet, Take by mouth every 6 (six) hours as needed  , Disp: , Rfl:     clotrimazole (LOTRIMIN) 1 % cream, Apply topically daily as needed, Disp: , Rfl:     cyanocobalamin (VITAMIN B-12) 500 mcg tablet, Take by mouth, Disp: , Rfl:     ferrous sulfate 325 (65 Fe) mg tablet, Take 325 mg by mouth daily with breakfast, Disp: , Rfl:     loperamide (IMODIUM) 2 mg capsule, Take 2 mg by mouth 3 (three) times a day as needed  , Disp: , Rfl:     metoprolol succinate (TOPROL-XL) 50 mg 24 hr tablet, Take 1 tablet by mouth daily, Disp: , Rfl:     Hbphc-Gcqkx-Ywlmdzd-Pramoxine (TRIPLE ANTIBIOTIC PLUS) 1 % OINT, Apply topically, Disp: , Rfl:     pantoprazole (PROTONIX) 40 mg tablet, Take 1 tablet by mouth daily, Disp: , Rfl:     polyethylene glycol (MiraLax) 17 GM/SCOOP powder, Take 17 g by mouth daily, Disp: , Rfl:     potassium chloride (K-DUR,KLOR-CON) 20 mEq tablet, Take 1 tablet by mouth every 12 (twelve) hours, Disp: , Rfl:     saccharomyces boulardii (Florastor) 250 mg capsule, Take 250 mg by mouth 2 (two) times a day, Disp: , Rfl:     Saline 0 65 % SOLN, into each nostril, Disp: , Rfl:     Tetrahydroz-Dextran-PEG-Povid (EYE DROPS ADVANCED RELIEF OP), Apply 1 drop to eye daily as needed, Disp: , Rfl:     warfarin (COUMADIN) 2 5 mg tablet, Take 2 5 mg by mouth  , Disp: , Rfl:     warfarin (COUMADIN) 5 mg tablet, Take 5 mg by mouth  , Disp: , Rfl:     Dextran 70-Hypromellose (ARTIFICIAL TEARS) 0 1-0 3 % SOLN, Apply to eye, Disp: , Rfl:     Emollient (CERAVE) CREA, Apply topically, Disp: , Rfl:     fluticasone (FLONASE) 50 mcg/act nasal spray, 1 spray into each nostril daily  , Disp: , Rfl:     guaiFENesin (ROBITUSSIN) 100 MG/5ML oral liquid, Take 200 mg by mouth 3 (three) times a day as needed for cough  , Disp: , Rfl:     Skin Protectants, Misc   (CALAZIME SKIN PROTECTANT) PSTE, Apply topically, Disp: , Rfl:     torsemide 40 MG TABS, Take 40 mg by mouth daily, Disp: 90 tablet, Rfl: 3  Allergies   Allergen Reactions    Meperidine      Demerol     Xarelto [Rivaroxaban]      Internal bleeding         Labs:     Chemistry        Component Value Date/Time     2014 0636    K 4 5 03/10/2022 0651    K 3 5 2014 0636     (H) 03/10/2022 0651     (H) 2014 0636    CO2 21 03/10/2022 0651    CO2 25 2014 0636    BUN 42 (H) 03/10/2022 0651    BUN 42 (H) 2014 0636    CREATININE 1 95 (H) 03/10/2022 0651    CREATININE 1 04 2014 0636        Component Value Date/Time    CALCIUM 7 8 (L) 03/10/2022 0651    CALCIUM 8 4 2014 0636    ALKPHOS 103 2014 0510    AST 61 (H) 2014 0510    ALT 40 2014 0510    BILITOT 0 78 2014 0510            Lab Results   Component Value Date    CHOL 192 11/15/2013     Lab Results   Component Value Date    HDL 70 11/15/2013     Lab Results   Component Value Date    LDLCALC 108 11/15/2013     Lab Results   Component Value Date    TRIG 69 11/15/2013     No results found for: [place], [place]    Imaging: Cardiac ep lab eps/ablations    Result Date: 3/10/2022  Narrative: ELECTROPHYSIOLOGY OPERATIVE REPORT PATIENT NAME: Angie Hudson :  1923 MRN: [id number] Date of surgery: 03/10/22 Surgeon: Judy Gil MD Pt Location: Cath Lab PROCEDURE PERFORMED: 1)Pacemaker generator change Preoperative Medications: ANESTHESIA: MAC POSTOPERATIVE DIAGNOSIS: Successful pacemaker generator change  Same as Preop  History: Angie Hudson is a 80 y o  woman with persistent atrial fibrillation, slow ventricular rate with dual chamber PPM, now at [facility] who presents for generator replacement  Procedure Elements: 1  Pacemaker pulse generator removal 2  New pacemaker generator implantation Procedure Details: Patient written consent obtained following full risk/benefit discussion  A time-out was conducted in the EP lab  Pre-procedure IV antibiotic was given   The patient was prepped and draped in sterile fashion  Local anesthetic was used and systemic sedation was managed by the anesthesia team   A skin incision was made at the device pocket, over the old scar  Plasmablade cautery was used for dissection and hemostasis with caution used to avoid injury to the indwelling lead insulation as the generator and leads were freed from capsule scar tissue  The leads were disconnected from the old pacemaker, which was removed  The leads were inspected and found to be intact with no old or new insulation abrasions  The ventricular lead was connected to a new pacemaker generator and a full interrogation was performed, showing good lead function  The atrial lead was capped as patient was in persistent atrial fibrillation  Patient also has non-mri compatible lead  The pacemaker generator was sutured to the pocket floor  The pocket was irrigated with antibiotic solution  Antibiotic pouch was placed around the generator  The full system was imaged fluoroscopically  The wound was closed in 3 layers with Vicryl suture  Aquaseal surgical adhesive was used to dress the closed incision  Final programmed parameters were tailored to the patient's specific pacing needs  Conclusion: Successful pacemaker generator replacement Atrial lead was capped  Plan: Continue current medications Keep aqua-seal on for 1 week and then take off F/u in wound check in 2 weeks Parameters:     Cardiac EP device report    Result Date: 3/23/2022  Narrative: BSC DUAL PPM (VVIR)/ NOT MRI CONDITIONAL DEVICE INTERROGATED IN THE [place] OFFICE: WOUND CHECK: INCISION CLEAN AND DRY WITH EDGES APPROXIMATED; WOUND CARE AND RESTRICTIONS REVIEWED WITH PATIENT  BATTERY VOLTAGE ADEQUATE (10 5 YRS)   99% (>40%/VVIR 60/DEPENDENT); ALL LEAD PARAMETERS WITHIN NORMAL LIMITS  NO HIGH RATE EPISODES  HX: CHRONIC AF & PATIENT TAKES WARFARIN, METOPROLOL SUCC  NO PROGRAMMING CHANGES MADE TO DEVICE PARAMETERS   PACEMAKER FUNCTIONING APPROPRIATELY  ES           Review of Systems   Constitutional: Negative  HENT: Negative  Eyes: Negative  Cardiovascular: Positive for dyspnea on exertion and leg swelling  Respiratory: Positive for shortness of breath  Endocrine: Negative  Hematologic/Lymphatic: Negative  Skin: Negative  Musculoskeletal: Negative  Gastrointestinal: Negative  Genitourinary: Negative  Neurological: Negative  Psychiatric/Behavioral: Negative  Allergic/Immunologic: Negative  Vitals:    03/25/22 1028   BP: 110/60   Pulse: 60   SpO2: 99%           Physical Exam  Vitals and nursing note reviewed  Constitutional:       Appearance: Normal appearance  HENT:      Head: Normocephalic  Nose: Nose normal       Mouth/Throat:      Mouth: Mucous membranes are moist    Eyes:      General: No scleral icterus  Conjunctiva/sclera: Conjunctivae normal    Cardiovascular:      Rate and Rhythm: Normal rate  Rhythm irregular  Heart sounds: No murmur heard  No gallop  Pulmonary:      Effort: Pulmonary effort is normal  No respiratory distress  Breath sounds: Normal breath sounds  No wheezing or rales  Abdominal:      General: Abdomen is flat  Bowel sounds are normal  There is no distension  Palpations: Abdomen is soft  Tenderness: There is no abdominal tenderness  There is no guarding  Musculoskeletal:      Cervical back: Normal range of motion and neck supple  Right lower leg: Edema present  Left lower leg: Edema present  Skin:     General: Skin is warm and dry  Neurological:      General: No focal deficit present  Mental Status: She is alert and oriented to person, place, and time  Psychiatric:         Mood and Affect: Mood normal          Behavior: Behavior normal          Discussion/Summary:    Persistent AF: Continue Metoprolol and anticoagulation with coumadin  Her last pacemaker check was fine  She is predominately Ventricular paced   INRs r stable  She had her generator changed       Continue pacemaker checks  100% V paced       Chronic Diastolic CHF: She has volume overload on exam today  We will try torsemide 40 mg daily and check bmp in a couple weeks           The patient was counseled regarding diagnostic results, instructions for management, risk factor reductions, impressions  total time of encounter was 25 minutes and 15 minutes was spent counseling

## 2022-04-06 ENCOUNTER — ANTICOAG VISIT (OUTPATIENT)
Dept: CARDIOLOGY CLINIC | Facility: CLINIC | Age: 87
End: 2022-04-06

## 2022-04-06 LAB — INR PPP: 3 (ref 0.84–1.19)

## 2022-04-06 NOTE — PROGRESS NOTES
Spoke with Ilya Hall at [facility], advised INR went up pretty quickly, advised will have patient take 5 mg Mon, 2 5 mg all other days, will recheck in 2 weeks 2/20/22    Physician order faxed to [facility]

## 2022-04-11 ENCOUNTER — TELEPHONE (OUTPATIENT)
Dept: CARDIOLOGY CLINIC | Facility: CLINIC | Age: 87
End: 2022-04-11

## 2022-04-11 NOTE — TELEPHONE ENCOUNTER
Per further discussion with the pt's facility staff, torsemide was increased on 4/6 by Dr Jay Gomes via communication / fax with the [facility] office  Pete Chen started the increased dose of torsemide 40 mg BID on 4/7  Still with no improvement in plus 3 edema  Spoke with Carolina Brito in [facility] office who will bring previous med change to Dr [employer] attention as he is in the office today  Tiger text sent by me to Dr Jay Gomes re med increase previously made on 4/7

## 2022-04-11 NOTE — TELEPHONE ENCOUNTER
Can we try 40 mg bid for the next three days and see how that goes? If still no change we can change to something else

## 2022-04-11 NOTE — TELEPHONE ENCOUNTER
Mrs Denton Suero was seen on 3/25 and started on Torsemide 40 mg daily  She said she was advised to call as the staff at her facility was concerned  Pt called today, said there has been no change or improvement since she started the torsemide  She thinks symptoms are a little worse  Wt goes up by a few ounces or even a lb every day or so  Continues with same FARRELL, not worse  Weight at home without clothes is 129 6  She weighed 129 fully clothed in the office  She said her weight a few months ago was around 120  Please advise

## 2022-04-15 DIAGNOSIS — I50.32 CHRONIC DIASTOLIC CHF (CONGESTIVE HEART FAILURE) (HCC): ICD-10-CM

## 2022-04-15 RX ORDER — METOLAZONE 2.5 MG/1
2.5 TABLET ORAL AS NEEDED
Qty: 30 TABLET | Refills: 0 | Status: SHIPPED | OUTPATIENT
Start: 2022-04-15

## 2022-04-15 NOTE — TELEPHONE ENCOUNTER
Pt called -     Pt states her edema is unchanged and hasn't noted an increase in urine  Called, spoke to Nursing, states pt remains with +2-+3 pitting edema  Per Dr Seth Nath, please have pt take Metolazone 2 5mg tomorrow morning x1, 30 minutes prior to Torsemide  K+ will remain the same  Nursing to follow-up on Monday with an update  Called, spoke to Ester Cooper  Message relayed as given  They will fax a verbal order to be signed  Torsemide dose corrected on Med Rec, Metolazone added

## 2022-04-18 ENCOUNTER — TELEPHONE (OUTPATIENT)
Dept: CARDIOLOGY CLINIC | Facility: CLINIC | Age: 87
End: 2022-04-18

## 2022-04-18 NOTE — TELEPHONE ENCOUNTER
Per Dr Jamia King - please call for update  Called, spoke to [facility]  Weight Saturday was 134 lb  Sunday 126 4 lb  Today 125 lb    Pt still has +2 pitting edema B/L LE and some shortness of breath with ambulation

## 2022-04-20 ENCOUNTER — ANTICOAG VISIT (OUTPATIENT)
Dept: CARDIOLOGY CLINIC | Facility: CLINIC | Age: 87
End: 2022-04-20

## 2022-04-20 LAB — INR PPP: 2.8 (ref 0.84–1.19)

## 2022-04-20 NOTE — PROGRESS NOTES
Left message for [facility], advised INR good, will continue 5 mg Mon, 2 5 mg all other days, will recheck in 2 weeks 5/4/22    Physician order faxed

## 2022-05-04 ENCOUNTER — ANTICOAG VISIT (OUTPATIENT)
Dept: CARDIOLOGY CLINIC | Facility: CLINIC | Age: 87
End: 2022-05-04

## 2022-05-04 LAB — INR PPP: 2.3 (ref 0.84–1.19)

## 2022-05-07 ENCOUNTER — APPOINTMENT (EMERGENCY)
Dept: RADIOLOGY | Facility: HOSPITAL | Age: 87
DRG: 604 | End: 2022-05-07
Payer: MEDICARE

## 2022-05-07 ENCOUNTER — HOSPITAL ENCOUNTER (INPATIENT)
Facility: HOSPITAL | Age: 87
LOS: 9 days | Discharge: HOME HEALTH | DRG: 604 | End: 2022-05-16
Attending: EMERGENCY MEDICINE | Admitting: INTERNAL MEDICINE
Payer: MEDICARE

## 2022-05-07 ENCOUNTER — APPOINTMENT (EMERGENCY)
Dept: CT IMAGING | Facility: HOSPITAL | Age: 87
DRG: 604 | End: 2022-05-07
Payer: MEDICARE

## 2022-05-07 DIAGNOSIS — I50.43 ACUTE ON CHRONIC COMBINED SYSTOLIC AND DIASTOLIC CHF (CONGESTIVE HEART FAILURE) (HCC): ICD-10-CM
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
## 2022-05-07 DIAGNOSIS — N17.9 ACUTE ON CHRONIC KIDNEY FAILURE (HCC): ICD-10-CM

## 2022-05-07 DIAGNOSIS — N32.89 BLADDER MASS: ICD-10-CM

## 2022-05-07 DIAGNOSIS — T07.XXXA MULTIPLE CONTUSIONS: ICD-10-CM

## 2022-05-07 DIAGNOSIS — N18.9 ACUTE ON CHRONIC KIDNEY FAILURE (HCC): ICD-10-CM

## 2022-05-07 DIAGNOSIS — U07.1 COVID-19 VIRUS INFECTION: ICD-10-CM

## 2022-05-07 DIAGNOSIS — I48.21 PERMANENT ATRIAL FIBRILLATION (HCC): ICD-10-CM

## 2022-05-07 DIAGNOSIS — S80.02XA HEMATOMA OF LEFT KNEE REGION: ICD-10-CM

## 2022-05-07 DIAGNOSIS — S81.012A LACERATION OF LEFT KNEE, INITIAL ENCOUNTER: ICD-10-CM

## 2022-05-07 DIAGNOSIS — I50.32 CHRONIC DIASTOLIC CHF (CONGESTIVE HEART FAILURE) (HCC): ICD-10-CM

## 2022-05-07 DIAGNOSIS — S09.90XA INJURY OF HEAD, INITIAL ENCOUNTER: ICD-10-CM

## 2022-05-07 DIAGNOSIS — S51.019A SKIN TEAR OF ELBOW WITHOUT COMPLICATION: ICD-10-CM

## 2022-05-07 LAB
ANION GAP SERPL CALCULATED.3IONS-SCNC: 9 MMOL/L (ref 4–13)
APTT PPP: 32 SECONDS (ref 23–37)
BASOPHILS # BLD AUTO: 0.01 THOUSANDS/ΜL (ref 0–0.1)
BASOPHILS NFR BLD AUTO: 0 % (ref 0–1)
BUN SERPL-MCNC: 55 MG/DL (ref 5–25)
CALCIUM SERPL-MCNC: 8 MG/DL (ref 8.3–10.1)
CHLORIDE SERPL-SCNC: 104 MMOL/L (ref 100–108)
CO2 SERPL-SCNC: 23 MMOL/L (ref 21–32)
CREAT SERPL-MCNC: 2.54 MG/DL (ref 0.6–1.3)
EOSINOPHIL # BLD AUTO: 0.01 THOUSAND/ΜL (ref 0–0.61)
EOSINOPHIL NFR BLD AUTO: 0 % (ref 0–6)
ERYTHROCYTE [DISTWIDTH] IN BLOOD BY AUTOMATED COUNT: 17.1 % (ref 11.6–15.1)
GFR SERPL CREATININE-BSD FRML MDRD: 15 ML/MIN/1.73SQ M
GLUCOSE SERPL-MCNC: 99 MG/DL (ref 65–140)
HCT VFR BLD AUTO: 27.5 % (ref 34.8–46.1)
HGB BLD-MCNC: 8.6 G/DL (ref 11.5–15.4)
IMM GRANULOCYTES # BLD AUTO: 0.02 THOUSAND/UL (ref 0–0.2)
IMM GRANULOCYTES NFR BLD AUTO: 1 % (ref 0–2)
INR PPP: 1.87 (ref 0.84–1.19)
LYMPHOCYTES # BLD AUTO: 0.81 THOUSANDS/ΜL (ref 0.6–4.47)
LYMPHOCYTES NFR BLD AUTO: 20 % (ref 14–44)
MCH RBC QN AUTO: 36.4 PG (ref 26.8–34.3)
MCHC RBC AUTO-ENTMCNC: 31.3 G/DL (ref 31.4–37.4)
MCV RBC AUTO: 117 FL (ref 82–98)
MONOCYTES # BLD AUTO: 0.44 THOUSAND/ΜL (ref 0.17–1.22)
MONOCYTES NFR BLD AUTO: 11 % (ref 4–12)
NEUTROPHILS # BLD AUTO: 2.86 THOUSANDS/ΜL (ref 1.85–7.62)
NEUTS SEG NFR BLD AUTO: 68 % (ref 43–75)
NRBC BLD AUTO-RTO: 1 /100 WBCS
PLATELET # BLD AUTO: 157 THOUSANDS/UL (ref 149–390)
PMV BLD AUTO: 10.3 FL (ref 8.9–12.7)
POTASSIUM SERPL-SCNC: 5 MMOL/L (ref 3.5–5.3)
PROTHROMBIN TIME: 21.2 SECONDS (ref 11.6–14.5)
RBC # BLD AUTO: 2.36 MILLION/UL (ref 3.81–5.12)
SODIUM SERPL-SCNC: 136 MMOL/L (ref 136–145)
WBC # BLD AUTO: 4.15 THOUSAND/UL (ref 4.31–10.16)

## 2022-05-07 PROCEDURE — 85610 PROTHROMBIN TIME: CPT | Performed by: EMERGENCY MEDICINE

## 2022-05-07 PROCEDURE — 85025 COMPLETE CBC W/AUTO DIFF WBC: CPT | Performed by: EMERGENCY MEDICINE

## 2022-05-07 PROCEDURE — 90715 TDAP VACCINE 7 YRS/> IM: CPT | Performed by: EMERGENCY MEDICINE

## 2022-05-07 PROCEDURE — G1004 CDSM NDSC: HCPCS

## 2022-05-07 PROCEDURE — 70450 CT HEAD/BRAIN W/O DYE: CPT

## 2022-05-07 PROCEDURE — 99223 1ST HOSP IP/OBS HIGH 75: CPT | Performed by: INTERNAL MEDICINE

## 2022-05-07 PROCEDURE — 90471 IMMUNIZATION ADMIN: CPT

## 2022-05-07 PROCEDURE — 73564 X-RAY EXAM KNEE 4 OR MORE: CPT

## 2022-05-07 PROCEDURE — 93005 ELECTROCARDIOGRAM TRACING: CPT

## 2022-05-07 PROCEDURE — 73700 CT LOWER EXTREMITY W/O DYE: CPT

## 2022-05-07 PROCEDURE — 36415 COLL VENOUS BLD VENIPUNCTURE: CPT | Performed by: EMERGENCY MEDICINE

## 2022-05-07 PROCEDURE — 72170 X-RAY EXAM OF PELVIS: CPT

## 2022-05-07 PROCEDURE — 85730 THROMBOPLASTIN TIME PARTIAL: CPT | Performed by: EMERGENCY MEDICINE

## 2022-05-07 PROCEDURE — 71045 X-RAY EXAM CHEST 1 VIEW: CPT

## 2022-05-07 PROCEDURE — 80048 BASIC METABOLIC PNL TOTAL CA: CPT | Performed by: EMERGENCY MEDICINE

## 2022-05-07 PROCEDURE — 99285 EMERGENCY DEPT VISIT HI MDM: CPT

## 2022-05-07 PROCEDURE — 99285 EMERGENCY DEPT VISIT HI MDM: CPT | Performed by: EMERGENCY MEDICINE

## 2022-05-07 RX ORDER — METOPROLOL SUCCINATE 50 MG/1
50 TABLET, EXTENDED RELEASE ORAL DAILY
Status: DISCONTINUED | OUTPATIENT
Start: 2022-05-08 | End: 2022-05-09

## 2022-05-07 RX ORDER — LIDOCAINE HYDROCHLORIDE 10 MG/ML
5 INJECTION, SOLUTION EPIDURAL; INFILTRATION; INTRACAUDAL; PERINEURAL ONCE
Status: COMPLETED | OUTPATIENT
Start: 2022-05-07 | End: 2022-05-07

## 2022-05-07 RX ORDER — PANTOPRAZOLE SODIUM 40 MG/1
40 TABLET, DELAYED RELEASE ORAL DAILY
Status: DISCONTINUED | OUTPATIENT
Start: 2022-05-08 | End: 2022-05-16 | Stop reason: HOSPADM

## 2022-05-07 RX ORDER — ONDANSETRON 2 MG/ML
4 INJECTION INTRAMUSCULAR; INTRAVENOUS EVERY 6 HOURS PRN
Status: DISCONTINUED | OUTPATIENT
Start: 2022-05-07 | End: 2022-05-16 | Stop reason: HOSPADM

## 2022-05-07 RX ORDER — MAGNESIUM HYDROXIDE/ALUMINUM HYDROXICE/SIMETHICONE 120; 1200; 1200 MG/30ML; MG/30ML; MG/30ML
30 SUSPENSION ORAL EVERY 6 HOURS PRN
Status: DISCONTINUED | OUTPATIENT
Start: 2022-05-07 | End: 2022-05-16 | Stop reason: HOSPADM

## 2022-05-07 RX ORDER — BUMETANIDE 0.25 MG/ML
2 INJECTION, SOLUTION INTRAMUSCULAR; INTRAVENOUS 2 TIMES DAILY
Status: DISCONTINUED | OUTPATIENT
Start: 2022-05-07 | End: 2022-05-09

## 2022-05-07 RX ORDER — SACCHAROMYCES BOULARDII 250 MG
250 CAPSULE ORAL 2 TIMES DAILY
Status: DISCONTINUED | OUTPATIENT
Start: 2022-05-07 | End: 2022-05-16 | Stop reason: HOSPADM

## 2022-05-07 RX ORDER — FLUTICASONE PROPIONATE 50 MCG
1 SPRAY, SUSPENSION (ML) NASAL DAILY
Status: DISCONTINUED | OUTPATIENT
Start: 2022-05-08 | End: 2022-05-16 | Stop reason: HOSPADM

## 2022-05-07 RX ORDER — FERROUS SULFATE 325(65) MG
325 TABLET ORAL
Status: DISCONTINUED | OUTPATIENT
Start: 2022-05-08 | End: 2022-05-16 | Stop reason: HOSPADM

## 2022-05-07 RX ORDER — ACETAMINOPHEN 325 MG/1
975 TABLET ORAL ONCE
Status: COMPLETED | OUTPATIENT
Start: 2022-05-07 | End: 2022-05-07

## 2022-05-07 RX ORDER — GINSENG 100 MG
1 CAPSULE ORAL ONCE
Status: COMPLETED | OUTPATIENT
Start: 2022-05-07 | End: 2022-05-07

## 2022-05-07 RX ORDER — POTASSIUM CHLORIDE 20 MEQ/1
20 TABLET, EXTENDED RELEASE ORAL DAILY
Status: DISCONTINUED | OUTPATIENT
Start: 2022-05-08 | End: 2022-05-16 | Stop reason: HOSPADM

## 2022-05-07 RX ORDER — ACETAMINOPHEN 325 MG/1
650 TABLET ORAL EVERY 6 HOURS PRN
Status: DISCONTINUED | OUTPATIENT
Start: 2022-05-07 | End: 2022-05-16 | Stop reason: HOSPADM

## 2022-05-07 RX ADMIN — PHYTONADIONE 10 MG: 10 INJECTION, EMULSION INTRAMUSCULAR; INTRAVENOUS; SUBCUTANEOUS at 14:55

## 2022-05-07 RX ADMIN — LIDOCAINE HYDROCHLORIDE 5 ML: 10 INJECTION, SOLUTION EPIDURAL; INFILTRATION; INTRACAUDAL; PERINEURAL at 13:45

## 2022-05-07 RX ADMIN — TETANUS TOXOID, REDUCED DIPHTHERIA TOXOID AND ACELLULAR PERTUSSIS VACCINE, ADSORBED 0.5 ML: 5; 2.5; 8; 8; 2.5 SUSPENSION INTRAMUSCULAR at 13:40

## 2022-05-07 RX ADMIN — BUMETANIDE 2 MG: 0.25 INJECTION, SOLUTION INTRAMUSCULAR; INTRAVENOUS at 18:14

## 2022-05-07 RX ADMIN — ACETAMINOPHEN 650 MG: 325 TABLET ORAL at 21:33

## 2022-05-07 RX ADMIN — ACETAMINOPHEN 975 MG: 325 TABLET ORAL at 13:37

## 2022-05-07 RX ADMIN — BACITRACIN ZINC 1 SMALL APPLICATION: 500 OINTMENT TOPICAL at 13:46

## 2022-05-07 RX ADMIN — Medication 250 MG: at 18:13

## 2022-05-07 NOTE — ASSESSMENT & PLAN NOTE
· Noted on CXR  · Previously was drained approximately 5yrs ago- suspected due to microperforation after PPM placement at that time  · Echocardiogram ordered

## 2022-05-07 NOTE — ED PROVIDER NOTES
Emergency Department Trauma Note  Gopal Garrett 80 y o  female MRN: [id number]  Unit/Bed#: ED 04/ED 04 Encounter: [id number]      Trauma Alert: Trauma Acuity: Trauma Evaluation  Model of Arrival: Mode of Arrival: ALS via Trauma Squad Name and Number: [place]  Trauma Team: Current Providers  Attending Provider: Kvng Noland DO  Attending Provider: Jett Clarke DO  Registered Nurse: Marquis Shelby RN  Advanced Practitioner: Zara Andino PA-C  Consulting Physician: Amy Easley MD  Consultants:     Other: {trauma Dr Starr Vega - STAT consult; notified at 220 pm via phone; History of Present Illness     Chief Complaint:   Chief Complaint   Patient presents with    Fall     tripped at [facility] assisted care  left sided injuries, takes coumadin     HPI:  Gopal Garrett is a 80 y o  female who presents with fall  Mechanism:Details of Incident: tripped while getting up to go to bathroom, left sided injuries Injury Date: 05/07/22   Injury Occurence Location - [address] [place] [address] Way: [facility]    Patient brought in by ambulance from assisted living fell just pta walking to bathroom and tripped on shoelaces  She does not recall hitting head but she has a hematoma left forehead  She complains of left hip pain  GCS 15  Unknown last tetanus vaccine  Non-tender at chest wall but portable films ordered in case she has hip fracture  Left hip xray non-specific - getting ct scan  Patient has skin tear left elbow and laceration left knee  Review of Systems   Constitutional: Negative for chills and fever  HENT: Negative for rhinorrhea and sore throat  Respiratory: Negative for shortness of breath  Cardiovascular: Negative for chest pain  Gastrointestinal: Negative for constipation, diarrhea, nausea and vomiting  Genitourinary: Negative for dysuria and frequency  Skin: Negative for rash  Neurological: Negative for light-headedness  Psychiatric/Behavioral: Negative for confusion     All other systems reviewed and are negative  Historical Information     Immunizations:   Immunization History   Administered Date(s) Administered    Influenza Split High Dose Preservative Free IM 11/06/2013, 11/06/2013    Influenza, seasonal, injectable 10/24/2011    Pneumococcal Polysaccharide PPV23 01/22/2008    Td (adult), adsorbed 03/09/2007    Tdap 05/07/2022       Past Medical History:   Diagnosis Date    Anemia, unspecified     Basal cell carcinoma of overlapping sites of skin     Bronchial pneumonia     Cellulitis of left lower limb     Chronic venous hypertension     Conductive hearing loss, bilateral     Diverticulosis of large intestine without perforation or abscess without bleeding     Edema     Heart failure, unspecified (HCC)     Low back pain     Other abnormalities of gait and mobility     Other lack of coordination     Other specified arthritis, other site     Pain in left knee     Pleural effusion in other conditions classified elsewhere     Presence of cardiac pacemaker     Repeated falls     Unspecified abnormalities of gait and mobility     Unspecified atrial fibrillation ([facility] [address] )      History reviewed  No pertinent family history  Past Surgical History:   Procedure Laterality Date    CARDIAC ELECTROPHYSIOLOGY PROCEDURE N/A 3/10/2022    Procedure: Cardiac pacer generator change- Single Chamber;  Surgeon: Mandy Shukla MD;  Location: [facility];   Service: Cardiology     Social History     Tobacco Use    Smoking status: Never Smoker    Smokeless tobacco: Never Used   Vaping Use    Vaping Use: Never used   Substance Use Topics    Alcohol use: Not Currently    Drug use: Not Currently     E-Cigarette/Vaping    E-Cigarette Use Never User      E-Cigarette/Vaping Substances    Nicotine No     THC No     CBD No     Flavoring No     Other No     Unknown No        Family History: non-contributory    Meds/Allergies   Prior to Admission Medications Prescriptions Last Dose Informant Patient Reported? Taking? Dextran 70-Hypromellose (ARTIFICIAL TEARS) 0 1-0 3 % SOLN  Outside Facility (Specify) Yes No   Sig: Apply to eye   Emollient (CERAVE) CREA  Outside Facility (Specify) Yes No   Sig: Apply topically   Xckmy-Ryhsy-Mcomnyd-Pramoxine (TRIPLE ANTIBIOTIC PLUS) 1 % OINT  Outside Facility (Specify) Yes No   Sig: Apply topically   Saline 0 65 % SOLN  Outside Facility (Specify) Yes No   Sig: into each nostril   Skin Protectants, Misc   (CALAZIME SKIN PROTECTANT) PSTE  Outside Facility (Specify) Yes No   Sig: Apply topically   Tetrahydroz-Dextran-PEG-Povid (EYE DROPS ADVANCED RELIEF OP)  Outside Facility (Specify) Yes No   Sig: Apply 1 drop to eye daily as needed   Torsemide 40 MG TABS   No No   Sig: Take 40 mg by mouth 2 (two) times a day   acetaminophen (TYLENOL) 325 mg tablet  Outside Facility (Specify) Yes No   Sig: Take by mouth every 6 (six) hours as needed     clotrimazole (LOTRIMIN) 1 % cream  Outside Facility (Specify) Yes No   Sig: Apply topically daily as needed   cyanocobalamin (VITAMIN B-12) 500 mcg tablet  Outside Facility (Specify) Yes No   Sig: Take by mouth   ferrous sulfate 325 (65 Fe) mg tablet  Outside Facility (Specify) Yes No   Sig: Take 325 mg by mouth daily with breakfast   fluticasone (FLONASE) 50 mcg/act nasal spray  Outside Facility (Specify) Yes No   Si spray into each nostril daily     guaiFENesin (ROBITUSSIN) 100 MG/5ML oral liquid  Outside Facility (Specify) Yes No   Sig: Take 200 mg by mouth 3 (three) times a day as needed for cough     loperamide (IMODIUM) 2 mg capsule  Outside Facility (Specify) Yes No   Sig: Take 2 mg by mouth 3 (three) times a day as needed     metolazone (ZAROXOLYN) 2 5 mg tablet   No No   Sig: Take 1 tablet (2 5 mg total) by mouth if needed (a half-hour prior to diuretic as directed)   metoprolol succinate (TOPROL-XL) 50 mg 24 hr tablet  Outside Facility (Specify) Yes No   Sig: Take 1 tablet by mouth daily pantoprazole (PROTONIX) 40 mg tablet  Outside Facility (Specify) Yes No   Sig: Take 1 tablet by mouth daily   polyethylene glycol (MiraLax) 17 GM/SCOOP powder  Outside Facility (Specify) Yes No   Sig: Take 17 g by mouth daily   potassium chloride (K-DUR,KLOR-CON) 20 mEq tablet  Outside Facility (Specify) Yes No   Sig: Take 1 tablet by mouth every 12 (twelve) hours   saccharomyces boulardii (Florastor) 250 mg capsule  Outside Facility (Specify) Yes No   Sig: Take 250 mg by mouth 2 (two) times a day   warfarin (COUMADIN) 2 5 mg tablet  Outside Facility (Specify) Yes No   Sig: Take 2 5 mg by mouth     warfarin (COUMADIN) 5 mg tablet  Outside Facility (Specify) Yes No   Sig: Take 5 mg by mouth        Facility-Administered Medications: None       Allergies   Allergen Reactions    Meperidine      Demerol     Xarelto [Rivaroxaban]      Internal bleeding         PHYSICAL EXAM    PE limited by: nothing    Objective   Vitals:   First set: Temperature: 97 9 °F (36 6 °C) (05/07/22 1253)  Pulse: 81 (05/07/22 1253)  Respirations: 19 (05/07/22 1253)  Blood Pressure: 142/74 (05/07/22 1253)  SpO2: 95 % (05/07/22 1253)    Primary Survey:   (A) Airway: patent  (B) Breathing: clear  (C) Circulation: Pulses:   normal  (D) Disabliity:  GCS Total:  15  (E) Expose:  Completed    Secondary Survey: (Click on Physical Exam tab above)  Physical Exam  Vitals and nursing note reviewed  Constitutional:       Appearance: She is well-developed  HENT:      Head: Normocephalic  Comments: Contusion left forehead     Right Ear: External ear normal       Left Ear: External ear normal       Nose: Nose normal       Mouth/Throat:      Mouth: Mucous membranes are dry  Eyes:      Conjunctiva/sclera: Conjunctivae normal       Pupils: Pupils are equal, round, and reactive to light  Cardiovascular:      Rate and Rhythm: Normal rate and regular rhythm  Heart sounds: Murmur heard         Pulmonary:      Effort: Pulmonary effort is normal  No respiratory distress  Breath sounds: Normal breath sounds  No wheezing  Chest:      Chest wall: No tenderness  Abdominal:      General: Bowel sounds are normal  There is no distension  Palpations: Abdomen is soft  Tenderness: There is no abdominal tenderness  Musculoskeletal:         General: No deformity  Cervical back: Normal range of motion and neck supple  No bony tenderness  No spinous process tenderness  Thoracic back: No bony tenderness  Lumbar back: No bony tenderness  Right hip: Normal       Left hip: Tenderness and bony tenderness present  Normal range of motion  Right upper leg: Edema present  Left upper leg: Edema present  No tenderness  Right knee: Normal pulse  Left knee: Ecchymosis present  Normal range of motion  Normal patellar mobility  Normal pulse  Right lower leg: No tenderness  Edema present  Left lower leg: No tenderness  Edema present  Legs:       Comments: Anterior knee 5 cm vertical laceration   Skin:     General: Skin is warm and dry  Findings: No rash  Neurological:      General: No focal deficit present  Mental Status: She is alert  GCS: GCS eye subscore is 4  GCS verbal subscore is 5  GCS motor subscore is 6  Sensory: No sensory deficit  Psychiatric:         Mood and Affect: Mood normal          Cervical spine cleared by clinical criteria?  Yes     Invasive Devices  Report    Peripheral Intravenous Line            Peripheral IV 05/07/22 Right Antecubital <1 day                Lab Results:   Results Reviewed     Procedure Component Value Units Date/Time    Basic metabolic panel [[id number]]  (Abnormal) Collected: 05/07/22 1309    Lab Status: Final result Specimen: Blood from Arm, Right Updated: 05/07/22 1345     Sodium 136 mmol/L      Potassium 5 0 mmol/L      Chloride 104 mmol/L      CO2 23 mmol/L      ANION GAP 9 mmol/L      BUN 55 mg/dL      Creatinine 2 54 mg/dL      Glucose 99 mg/dL Calcium 8 0 mg/dL      eGFR 15 ml/min/1 73sq m     Narrative:      [place] guidelines for Chronic Kidney Disease (CKD):     Stage 1 with normal or high GFR (GFR > 90 mL/min/1 73 square meters)    Stage 2 Mild CKD (GFR = 60-89 mL/min/1 73 square meters)    Stage 3A Moderate CKD (GFR = 45-59 mL/min/1 73 square meters)    Stage 3B Moderate CKD (GFR = 30-44 mL/min/1 73 square meters)    Stage 4 Severe CKD (GFR = 15-29 mL/min/1 73 square meters)    Stage 5 End Stage CKD (GFR <15 mL/min/1 73 square meters)  Note: GFR calculation is accurate only with a steady state creatinine    APTT [[id number]]  (Normal) Collected: 05/07/22 1309    Lab Status: Final result Specimen: Blood from Arm, Right Updated: 05/07/22 1334     PTT 32 seconds     Protime-INR [[id number]]  (Abnormal) Collected: 05/07/22 1309    Lab Status: Final result Specimen: Blood from Arm, Right Updated: 05/07/22 1334     Protime 21 2 seconds      INR 1 87    CBC and differential [[id number]]  (Abnormal) Collected: 05/07/22 1309    Lab Status: Final result Specimen: Blood from Arm, Right Updated: 05/07/22 1316     WBC 4 15 Thousand/uL      RBC 2 36 Million/uL      Hemoglobin 8 6 g/dL      Hematocrit 27 5 %       fL      MCH 36 4 pg      MCHC 31 3 g/dL      RDW 17 1 %      MPV 10 3 fL      Platelets 362 Thousands/uL      nRBC 1 /100 WBCs      Neutrophils Relative 68 %      Immat GRANS % 1 %      Lymphocytes Relative 20 %      Monocytes Relative 11 %      Eosinophils Relative 0 %      Basophils Relative 0 %      Neutrophils Absolute 2 86 Thousands/µL      Immature Grans Absolute 0 02 Thousand/uL      Lymphocytes Absolute 0 81 Thousands/µL      Monocytes Absolute 0 44 Thousand/µL      Eosinophils Absolute 0 01 Thousand/µL      Basophils Absolute 0 01 Thousands/µL                  Imaging Studies:   Direct to CT: No  CT lower extremity wo contrast left   Final Result by Vineet Gonzalez MD (05/07 5785)      No fracture  Large lateral subcutaneous hematoma  Workstation performed: [id number]         CT lower extremity wo contrast left   Final Result by Silva Branch MD (05/07 1407)      No evidence for fracture identified  Soft tissue infiltration in the subcutaneous fat over the left gluteal region likely representing soft tissue contusion  Incidental note made of polypoid mass within the bladder concerning for neoplasm  I personally discussed this study with Tg Kerri on 5/7/2022 at 1:53 PM                   Workstation performed: [id number]         TRAUMA - CT head wo contrast   Final Result by Renny Muniz DO (05/07 0623)      Stable moderate to advanced 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  No signs of acute traumatic injury  Workstation performed: [id number]         XR knee 4+ vw left injury   Final Result by Abhjieet Medina MD (05/07 1065)   No acute osseous abnormality  Workstation performed: [id number]         XR Trauma chest portable   Final Result by Abhijeet Medina MD (05/07 3583)   Clear lungs  Cardiomegaly, more rounded appearance of the heart compared to 2010 may be due to different positioning  Correlate clinically for pericardial effusion  Workstation performed: [id number]         XR Trauma pelvis ap only 1 or 2 vw   Final Result by Abihjeet Medina MD (05/07 140)   No acute osseous abnormality            Workstation performed: [id number]               Procedures  ECG 12 Lead Documentation Only    Date/Time: 5/7/2022 2:50 PM  Performed by: Malik Caraballo DO  Authorized by: Malik Caraballo DO     Indications / Diagnosis:  Fall  ECG reviewed by me, the ED Provider: yes    Patient location:  ED  Previous ECG:     Previous ECG:  Compared to current    Comparison ECG info:  2-22    Similarity:  Changes noted  Interpretation:     Interpretation: non-specific    Quality:     Tracing quality:  Limited by artifact  Rate:     ECG rate:  71    ECG rate assessment: normal    Rhythm:     Rhythm: atrial fibrillation and paced    Pacing:     Capture:  Intermittent    Type of pacing:  Ventricular  Ectopy:     Ectopy: none    QRS:     QRS axis:  Normal    QRS intervals:  Normal  Conduction:     Conduction: normal    ST segments:     ST segments:  Normal  T waves:     T waves: inverted      Inverted:  V4, V5 and V6             ED Course  ED Course as of 05/07/22 1657   Sat May 07, 2022   1430 Reviewed with trauma Uziel Huang - VSS, no rapid expansion of hematoma - we agree patient can stay here for coumadin reversal to minimize bleeding and PT/ OT eval, recheck labs   1452 Abnormal chest xray is non-specific - patient has no chest discomfort - imaging and ekg ordered in case patient would need surgery             MDM  Number of Diagnoses or Management Options  Acute on chronic kidney failure ([facility] [address] ): new and requires workup  Fall, initial encounter: new and requires workup  Hematoma of left knee region: new and requires workup  Injury of head, initial encounter: new and requires workup  Laceration of left knee, initial encounter: new and requires workup  Multiple contusions: new and requires workup  Skin tear of elbow without complication: new and requires workup     Amount and/or Complexity of Data Reviewed  Clinical lab tests: ordered and reviewed  Tests in the radiology section of CPT®: ordered and reviewed  Obtain history from someone other than the patient: yes  Discuss the patient with other providers: yes    Patient Progress  Patient progress: improved          Disposition  [facility] Transfer: No  Final diagnoses:   Fall, initial encounter   Injury of head, initial encounter   Multiple contusions - left forehead, left arm, left hip   Hematoma of left knee region   Laceration of left knee, initial encounter   Acute on chronic kidney failure (HCC)   Skin tear of elbow without complication - left     Time reflects when diagnosis was documented in both MDM as applicable and the Disposition within this note     Time User Action Codes Description Comment    5/7/2022  2:24 PM Tamsen Tanner Add [F09  PQPW] Fall, initial encounter     5/7/2022  2:24 PM Tamsen Tanner Add [J85 44LO] Injury of head, initial encounter     5/7/2022  2:24 PM Melchor Dye, [address]  XXXA] Multiple contusions     5/7/2022  2:25 PM Melchor Dye, [address] [place]  XXXA] Multiple contusions left forehead, left arm, left hip    5/7/2022  2:25 PM Tamsen Tanner Add [S80 02XA] Hematoma of left knee region     5/7/2022  2:25 PM Tamsen Tanner Add [Q66 996D] Laceration of left knee, initial encounter     5/7/2022  2:25 PM Tamsen Tanner Add [N17 9,  N18 9] Acute on chronic kidney failure ([facility] [address] )     5/7/2022  2:42 PM Tamsen Tanner Add [S51 019A] Skin tear of elbow without complication     5/3/9684  2:42 PM Tamsen Tanner Modify [W00 234T] Skin tear of elbow without complication left    8/7/0697  3:42 PM SkdavidtGegera Semen Add [I50 43] Acute on chronic combined systolic and diastolic CHF (congestive heart failure) ([facility] [address] )     5/7/2022  3:56 PM [place], Halle Semen Add [X89 37] Chronic diastolic CHF (congestive heart failure) [facility])       ED Disposition     ED Disposition Condition Date/Time Comment    Admit Stable Sat May 7, 2022  2:42 PM Case was discussed with Hugh Lesches* and the patient's admission status was agreed to be Admission Status: inpatient status to the service of Dr Dex Segura**   Follow-up Information    None       Patient's Medications   Discharge Prescriptions    No medications on file     No discharge procedures on file      PDMP Review     None          ED Provider  Electronically Signed by         Malik Caraballo DO  05/07/22 5952

## 2022-05-07 NOTE — ASSESSMENT & PLAN NOTE
Wt Readings from Last 3 Encounters:   03/25/22 58 5 kg (129 lb)   03/10/22 53 6 kg (118 lb 2 7 oz)   02/11/22 53 6 kg (118 lb 3 2 oz)     · Patient has been having medications adjusted outpatient due to ongoing fluid retention in lower extremeties  · Weight as of 3/10/22: 118lbs   Patient reports her weight had gone up to 130lbs  · On torsemide 40mg BID with 2 5mg PRN metolazone  · Initiate 2mg Bumex BID  · Daily weights  · Intake and output  · Cardiology Consulted

## 2022-05-07 NOTE — H&P
Gianfranco Jose  H&P- [facility] 2/28/1923, 80 y o  female MRN: [id number]  Unit/Bed#: ED 04 Encounter: [id number]  Primary Care Provider: No primary care provider on file  Date and time admitted to hospital: 5/7/2022 12:51 PM    * Acute on chronic combined systolic and diastolic CHF (congestive heart failure) ([facility])  Assessment & Plan  Wt Readings from Last 3 Encounters:   03/25/22 58 5 kg (129 lb)   03/10/22 53 6 kg (118 lb 2 7 oz)   02/11/22 53 6 kg (118 lb 3 2 oz)     · Patient has been having medications adjusted outpatient due to ongoing fluid retention in lower extremeties  · Weight as of 3/10/22: 118lbs   Patient reports her weight had gone up to 130lbs  · On torsemide 40mg BID with 2 5mg PRN metolazone  · Initiate 2mg Bumex BID  · Daily weights  · Intake and output  · Cardiology Consulted        Permanent atrial fibrillation [facility])  Assessment & Plan  · Bi-V dual PPM Last interrogation 3/2022- functioning normally  · Managed on coumadin 2 5mg daily, 5mg Mondays  · Currently holding given hematoma  · Trend INR daily    CAMPBELL (acute kidney injury) ([facility] [address] )  Assessment & Plan  · 1 95 3/10/2022, prior to this in 2014, baseline was approximately 1 3-1 5  · Suspect due to volume overload  · 2 54 at time of admission  · Trend BMP daily with diuresis    Pericardial effusion  Assessment & Plan  · Noted on CXR  · Previously was drained approximately 5yrs ago- suspected due to microperforation after PPM placement at that time  · Echocardiogram ordered    Fall  Assessment & Plan  · Mechanical fall due to loose shoes  · Typically ambulates with a walker  · PT/OT ordered   · CT for head negative for bleed despite impact to left side of forehead  · Evaluated and cleared by trauma team for admission  · Recommending holding Coumadin, administering vitamin K IV given large hematoma of left knee      VTE Pharmacologic Prophylaxis: VTE Score: 5 High Risk (Score >/= 5) - Pharmacological DVT Prophylaxis Contraindicated  Sequential Compression Devices Ordered  Code Status: Prior   Discussion with family: Updated  (daughter and son in law) at bedside  Anticipated Length of Stay: Patient will be admitted on an inpatient basis with an anticipated length of stay of greater than 2 midnights secondary to CHF exacerbation, fall, hematoma  Total Time for Visit, including Counseling / Coordination of Care: 60 minutes Greater than 50% of this total time spent on direct patient counseling and coordination of care  Chief Complaint: Fall    History of Present Illness:  Juan De La Paz is a 80 y o  female with a PMH of atrial fibrillation status post Bi V ppm, CHF with recent volume overload, CKD, history of pericardial effusion drained approximately 5 years ago, chronically on Coumadin who presents with fall with subsequent hematoma  Patient reports over the past month or 2 she has had worsening lower extremity edema, she has been in communication with her outpatient cardiologist to as been adjusting her diuretic, torsemide had been increased to 40 mg b i d  And she was taking metolazone as needed  She reports her leg swelling is better than it was a month ago but is still not at her baseline  Last stable dry weight noted to be 118  She reports being as high as 134 lb  Will place patient on Bumex 2 mg IV b i d , place Cardiology consult  Questionable pericardial effusion noted on CXR  Will order echocardiogram to evaluate this further as well as for CHF exacerbation  Patient suffered mechanical fall and has left knee hematoma, no intracranial bleed despite being on Coumadin  Currently holding Coumadin, received vitamin K  Continue to trend INR and monitor hemoglobin and hematoma  Continue Ace wraps to lower extremity  Review of Systems:  Review of Systems   Cardiovascular: Positive for leg swelling  All other systems reviewed and are negative        Past Medical and Surgical History: Past Medical History:   Diagnosis Date    Anemia, unspecified     Basal cell carcinoma of overlapping sites of skin     Bronchial pneumonia     Cellulitis of left lower limb     Chronic venous hypertension     Conductive hearing loss, bilateral     Diverticulosis of large intestine without perforation or abscess without bleeding     Edema     Heart failure, unspecified (HCC)     Low back pain     Other abnormalities of gait and mobility     Other lack of coordination     Other specified arthritis, other site     Pain in left knee     Pleural effusion in other conditions classified elsewhere     Presence of cardiac pacemaker     Repeated falls     Unspecified abnormalities of gait and mobility     Unspecified atrial fibrillation (HCC)        Past Surgical History:   Procedure Laterality Date    CARDIAC ELECTROPHYSIOLOGY PROCEDURE N/A 3/10/2022    Procedure: Cardiac pacer generator change- Single Chamber;  Surgeon: Nissa Rodriguez MD;  Location: [facility]; Service: Cardiology       Meds/Allergies:  Prior to Admission medications    Medication Sig Start Date End Date Taking?  Authorizing Provider   acetaminophen (TYLENOL) 325 mg tablet Take by mouth every 6 (six) hours as needed      Historical Provider, MD   clotrimazole (LOTRIMIN) 1 % cream Apply topically daily as needed    Historical Provider, MD   cyanocobalamin (VITAMIN B-12) 500 mcg tablet Take by mouth 3/23/16   Historical Provider, MD   Dextran 70-Hypromellose (ARTIFICIAL TEARS) 0 1-0 3 % SOLN Apply to eye    Historical Provider, MD   Emollient (Arleth Cord) CREA Apply topically    Historical Provider, MD   ferrous sulfate 325 (65 Fe) mg tablet Take 325 mg by mouth daily with breakfast    Historical Provider, MD   fluticasone (FLONASE) 50 mcg/act nasal spray 1 spray into each nostril daily      Historical Provider, MD   guaiFENesin (ROBITUSSIN) 100 MG/5ML oral liquid Take 200 mg by mouth 3 (three) times a day as needed for cough Historical Provider, MD   loperamide (IMODIUM) 2 mg capsule Take 2 mg by mouth 3 (three) times a day as needed      Historical Provider, MD   metolazone (ZAROXOLYN) 2 5 mg tablet Take 1 tablet (2 5 mg total) by mouth if needed (a half-hour prior to diuretic as directed) 4/15/22   Palmira Arenas MD   metoprolol succinate (TOPROL-XL) 50 mg 24 hr tablet Take 1 tablet by mouth daily 1/30/12   Historical Provider, MD   Uprmc-Xckxe-Rczfgle-Pramoxine (TRIPLE ANTIBIOTIC PLUS) 1 % OINT Apply topically    Historical Provider, MD   pantoprazole (PROTONIX) 40 mg tablet Take 1 tablet by mouth daily 9/30/15   Historical Provider, MD   polyethylene glycol (MiraLax) 17 GM/SCOOP powder Take 17 g by mouth daily    Historical Provider, MD   potassium chloride (K-DUR,KLOR-CON) 20 mEq tablet Take 1 tablet by mouth every 12 (twelve) hours 2/12/14   Historical Provider, MD   saccharomyces boulardii (Florastor) 250 mg capsule Take 250 mg by mouth 2 (two) times a day    Historical Provider, MD   Saline 0 65 % SOLN into each nostril    Historical Provider, MD   Skin Protectants, Misc  (CALAZIME SKIN PROTECTANT) PSTE Apply topically    Historical Provider, MD   Tetrahydroz-Dextran-PEG-Povid (EYE DROPS ADVANCED RELIEF OP) Apply 1 drop to eye daily as needed    Historical Provider, MD   Torsemide 40 MG TABS Take 40 mg by mouth 2 (two) times a day 4/15/22   Palmira Arenas MD   warfarin (COUMADIN) 2 5 mg tablet Take 2 5 mg by mouth   5/27/14   Historical Provider, MD   warfarin (COUMADIN) 5 mg tablet Take 5 mg by mouth   6/1/19   Historical Provider, MD     I have reveiwed home medications using records provided by   Allergies: Allergies   Allergen Reactions    Meperidine      Demerol     Xarelto [Rivaroxaban]      Internal bleeding         Social History:  Marital Status:     Occupation:  Retired  Patient Pre-hospital Living Situation: [facility]: [address]  Patient Pre-hospital Level of Mobility: walks with walker  Patient Pre-hospital Diet Restrictions: Cardiac  Substance Use History:   Social History     Substance and Sexual Activity   Alcohol Use Not Currently     Social History     Tobacco Use   Smoking Status Never Smoker   Smokeless Tobacco Never Used     Social History     Substance and Sexual Activity   Drug Use Not Currently       Family History:  History reviewed  No pertinent family history  Physical Exam:     Vitals:   Blood Pressure: 120/58 (05/07/22 1530)  Pulse: 60 (05/07/22 1530)  Temperature: 97 9 °F (36 6 °C) (05/07/22 1253)  Temp Source: Temporal (05/07/22 1253)  Respirations: 20 (05/07/22 1530)  SpO2: 97 % (05/07/22 1530)    Physical Exam  Vitals and nursing note reviewed  Constitutional:       General: She is not in acute distress  Appearance: Normal appearance  She is well-developed  HENT:      Head: Normocephalic and atraumatic  Eyes:      General: No scleral icterus  Conjunctiva/sclera: Conjunctivae normal    Cardiovascular:      Rate and Rhythm: Normal rate and regular rhythm  Heart sounds: Murmur heard  Pulmonary:      Effort: Pulmonary effort is normal       Breath sounds: Rales present  No wheezing or rhonchi  Abdominal:      General: There is no distension  Palpations: Abdomen is soft  Musculoskeletal:      Right lower leg: 3+ Pitting Edema present  Left lower leg: 3+ Pitting Edema present  Skin:     General: Skin is warm and dry  Neurological:      General: No focal deficit present  Mental Status: She is alert     Psychiatric:         Mood and Affect: Mood normal                    Additional Data:     Lab Results:  Results from last 7 days   Lab Units 05/07/22  1309   WBC Thousand/uL 4 15*   HEMOGLOBIN g/dL 8 6*   HEMATOCRIT % 27 5*   PLATELETS Thousands/uL 157   NEUTROS PCT % 68   LYMPHS PCT % 20   MONOS PCT % 11   EOS PCT % 0     Results from last 7 days   Lab Units 05/07/22  1309   SODIUM mmol/L 136   POTASSIUM mmol/L 5 0   CHLORIDE mmol/L 104   CO2 mmol/L 23   BUN mg/dL 55*   CREATININE mg/dL 2 54*   ANION GAP mmol/L 9   CALCIUM mg/dL 8 0*   GLUCOSE RANDOM mg/dL 99     Results from last 7 days   Lab Units 05/07/22  1309   INR  1 87*                   Imaging: Reviewed radiology reports from this admission including: chest xray and CT head and Personally reviewed the following imaging: chest xray  CT lower extremity wo contrast left   Final Result by Glen Shaffer MD (05/07 1405)      No fracture  Large lateral subcutaneous hematoma  Workstation performed: [id number]         CT lower extremity wo contrast left   Final Result by Malia Becerril MD (05/07 1407)      No evidence for fracture identified  Soft tissue infiltration in the subcutaneous fat over the left gluteal region likely representing soft tissue contusion  Incidental note made of polypoid mass within the bladder concerning for neoplasm  I personally discussed this study with Valery Reveles on 5/7/2022 at 1:53 PM                   Workstation performed: [id number]         TRAUMA - CT head wo contrast   Final Result by Renny Fowler DO (05/07 1333)      Stable moderate to advanced 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  No signs of acute traumatic injury  Workstation performed: [id number]         XR knee 4+ vw left injury   Final Result by Glen Shaffer MD (05/07 1405)   No acute osseous abnormality  Workstation performed: [id number]         XR Trauma chest portable   Final Result by Glen Shaffer MD (05/07 1413)   Clear lungs  Cardiomegaly, more rounded appearance of the heart compared to 2010 may be due to different positioning  Correlate clinically for pericardial effusion  Workstation performed: [id number]         XR Trauma pelvis ap only 1 or 2 vw   Final Result by Glen Shaffer MD (05/07 1405)   No acute osseous abnormality            Workstation performed: [id number]             EKG and Other Studies Reviewed on Admission:   · EKG: Atrial fibrillation  HR 71bpm, paced  ** Please Note: This note has been constructed using a voice recognition system   **

## 2022-05-07 NOTE — ASSESSMENT & PLAN NOTE
· Mechanical fall due to loose shoes  · Typically ambulates with a walker  · PT/OT ordered   · CT for head negative for bleed despite impact to left side of forehead  · Evaluated and cleared by trauma team for admission  · Recommending holding Coumadin, administering vitamin K IV given large hematoma of left knee

## 2022-05-07 NOTE — ASSESSMENT & PLAN NOTE
· 1 95 3/10/2022, prior to this in 2014, baseline was approximately 1 3-1 5  · Suspect due to volume overload  · 2 54 at time of admission  · Trend BMP daily with diuresis

## 2022-05-07 NOTE — ASSESSMENT & PLAN NOTE
· Bi-V dual PPM Last interrogation 3/2022- functioning normally  · Managed on coumadin 2 5mg daily, 5mg Mondays  · Currently holding given hematoma  · Trend INR daily

## 2022-05-08 LAB
ANION GAP SERPL CALCULATED.3IONS-SCNC: 7 MMOL/L (ref 4–13)
BASOPHILS # BLD AUTO: 0.02 THOUSANDS/ΜL (ref 0–0.1)
BASOPHILS NFR BLD AUTO: 1 % (ref 0–1)
BUN SERPL-MCNC: 53 MG/DL (ref 5–25)
CALCIUM SERPL-MCNC: 7.8 MG/DL (ref 8.3–10.1)
CHLORIDE SERPL-SCNC: 105 MMOL/L (ref 100–108)
CO2 SERPL-SCNC: 24 MMOL/L (ref 21–32)
CREAT SERPL-MCNC: 2.38 MG/DL (ref 0.6–1.3)
EOSINOPHIL # BLD AUTO: 0.01 THOUSAND/ΜL (ref 0–0.61)
EOSINOPHIL NFR BLD AUTO: 0 % (ref 0–6)
ERYTHROCYTE [DISTWIDTH] IN BLOOD BY AUTOMATED COUNT: 16.8 % (ref 11.6–15.1)
GFR SERPL CREATININE-BSD FRML MDRD: 16 ML/MIN/1.73SQ M
GLUCOSE SERPL-MCNC: 89 MG/DL (ref 65–140)
HCT VFR BLD AUTO: 26.7 % (ref 34.8–46.1)
HCT VFR BLD AUTO: 27.7 % (ref 34.8–46.1)
HGB BLD-MCNC: 8.4 G/DL (ref 11.5–15.4)
HGB BLD-MCNC: 8.8 G/DL (ref 11.5–15.4)
IMM GRANULOCYTES # BLD AUTO: 0.01 THOUSAND/UL (ref 0–0.2)
IMM GRANULOCYTES NFR BLD AUTO: 0 % (ref 0–2)
LYMPHOCYTES # BLD AUTO: 0.73 THOUSANDS/ΜL (ref 0.6–4.47)
LYMPHOCYTES NFR BLD AUTO: 18 % (ref 14–44)
MCH RBC QN AUTO: 36.4 PG (ref 26.8–34.3)
MCHC RBC AUTO-ENTMCNC: 31.5 G/DL (ref 31.4–37.4)
MCV RBC AUTO: 116 FL (ref 82–98)
MONOCYTES # BLD AUTO: 0.48 THOUSAND/ΜL (ref 0.17–1.22)
MONOCYTES NFR BLD AUTO: 12 % (ref 4–12)
NEUTROPHILS # BLD AUTO: 2.71 THOUSANDS/ΜL (ref 1.85–7.62)
NEUTS SEG NFR BLD AUTO: 69 % (ref 43–75)
NRBC BLD AUTO-RTO: 1 /100 WBCS
PLATELET # BLD AUTO: 152 THOUSANDS/UL (ref 149–390)
PMV BLD AUTO: 11.2 FL (ref 8.9–12.7)
POTASSIUM SERPL-SCNC: 4.1 MMOL/L (ref 3.5–5.3)
RBC # BLD AUTO: 2.31 MILLION/UL (ref 3.81–5.12)
SODIUM SERPL-SCNC: 136 MMOL/L (ref 136–145)
WBC # BLD AUTO: 3.96 THOUSAND/UL (ref 4.31–10.16)

## 2022-05-08 PROCEDURE — 85025 COMPLETE CBC W/AUTO DIFF WBC: CPT | Performed by: PHYSICIAN ASSISTANT

## 2022-05-08 PROCEDURE — 85014 HEMATOCRIT: CPT | Performed by: PHYSICIAN ASSISTANT

## 2022-05-08 PROCEDURE — 99232 SBSQ HOSP IP/OBS MODERATE 35: CPT | Performed by: PHYSICIAN ASSISTANT

## 2022-05-08 PROCEDURE — 85018 HEMOGLOBIN: CPT | Performed by: PHYSICIAN ASSISTANT

## 2022-05-08 PROCEDURE — 80048 BASIC METABOLIC PNL TOTAL CA: CPT | Performed by: PHYSICIAN ASSISTANT

## 2022-05-08 PROCEDURE — 99222 1ST HOSP IP/OBS MODERATE 55: CPT | Performed by: INTERNAL MEDICINE

## 2022-05-08 RX ADMIN — CYANOCOBALAMIN TAB 500 MCG 250 MCG: 500 TAB at 09:34

## 2022-05-08 RX ADMIN — BUMETANIDE 2 MG: 0.25 INJECTION, SOLUTION INTRAMUSCULAR; INTRAVENOUS at 17:42

## 2022-05-08 RX ADMIN — POTASSIUM CHLORIDE 20 MEQ: 20 TABLET, EXTENDED RELEASE ORAL at 09:34

## 2022-05-08 RX ADMIN — Medication 250 MG: at 17:42

## 2022-05-08 RX ADMIN — BUMETANIDE 2 MG: 0.25 INJECTION, SOLUTION INTRAMUSCULAR; INTRAVENOUS at 09:34

## 2022-05-08 RX ADMIN — Medication 250 MG: at 09:35

## 2022-05-08 RX ADMIN — METOPROLOL SUCCINATE 50 MG: 50 TABLET, EXTENDED RELEASE ORAL at 09:34

## 2022-05-08 RX ADMIN — PANTOPRAZOLE SODIUM 40 MG: 40 TABLET, DELAYED RELEASE ORAL at 09:34

## 2022-05-08 RX ADMIN — FERROUS SULFATE TAB 325 MG (65 MG ELEMENTAL FE) 325 MG: 325 (65 FE) TAB at 09:34

## 2022-05-08 RX ADMIN — FLUTICASONE PROPIONATE 1 SPRAY: 50 SPRAY, METERED NASAL at 09:42

## 2022-05-08 NOTE — ASSESSMENT & PLAN NOTE
· 1 95 3/10/2022, prior to this in 2014, baseline was approximately 1 3-1 5  · Suspect due to volume overload  · 2 54 at time of admission  · Trend BMP daily with diuresis- improving, 2 38 today

## 2022-05-08 NOTE — ASSESSMENT & PLAN NOTE
· Mechanical fall due to loose shoes  · Typically ambulates with a walker  · PT/OT ordered   · CT for head negative for bleed despite impact to left side of forehead  · Evaluated and cleared by trauma team for admission  · Recommending holding Coumadin, administering vitamin K IV given large hematoma of left knee  · Hb remains stable (mid-8)

## 2022-05-08 NOTE — CONSULTS
Pt is amenable to trial Ensure compact BID to aid in meeting estimated needs  PO intake , per interview, is suboptimal   Will liberalize diet to DEIRDRE to encourage intake  Pt has requested sausage at breakfast meal  Pt states knowledge of low Na diet for CHF and it appears that pt follows these recommendations at [facility] except for sausage at breakfast  Noted in diet orders that pt may have sausage at breakfast only, otherwise DEIRDRE will apply  Will continue to monitor po intake/wts/acceptance of supplements and adjust as warranted

## 2022-05-08 NOTE — ASSESSMENT & PLAN NOTE
Wt Readings from Last 3 Encounters:   05/08/22 56 7 kg (125 lb)   03/25/22 58 5 kg (129 lb)   03/10/22 53 6 kg (118 lb 2 7 oz)     · Patient has been having medications adjusted outpatient due to ongoing fluid retention in lower extremeties  · Weight as of 3/10/22: 118lbs   Patient reports her weight had gone up to 130lbs  · On torsemide 40mg BID with 2 5mg PRN metolazone  · 2mg Bumex BID  · Daily weights  · Intake and output  · +780cc since admission  · -4lbs  · Cardiology Consulted

## 2022-05-08 NOTE — PROGRESS NOTES
[place]  Progress Note Alek Cabello 2/28/1923, 80 y o  female MRN: [id number]  Unit/Bed#: -01 Encounter: [id number]  Primary Care Provider: No primary care provider on file  Date and time admitted to hospital: 5/7/2022 12:51 PM    * Acute on chronic combined systolic and diastolic CHF (congestive heart failure) ([facility])  Assessment & Plan  Wt Readings from Last 3 Encounters:   05/08/22 56 7 kg (125 lb)   03/25/22 58 5 kg (129 lb)   03/10/22 53 6 kg (118 lb 2 7 oz)     · Patient has been having medications adjusted outpatient due to ongoing fluid retention in lower extremeties  · Weight as of 3/10/22: 118lbs   Patient reports her weight had gone up to 130lbs  · On torsemide 40mg BID with 2 5mg PRN metolazone  · 2mg Bumex BID  · Daily weights  · Intake and output  · +780cc since admission  · -4lbs  · Cardiology Consulted        Permanent atrial fibrillation [facility])  Assessment & Plan  · Bi-V dual PPM Last interrogation 3/2022- functioning normally  · Managed on coumadin 2 5mg daily, 5mg Mondays  · Currently holding given hematoma  · Trend INR daily    CAMPBELL (acute kidney injury) ([facility] [address] )  Assessment & Plan  · 1 95 3/10/2022, prior to this in 2014, baseline was approximately 1 3-1 5  · Suspect due to volume overload  · 2 54 at time of admission  · Trend BMP daily with diuresis- improving, 2 38 today    Pericardial effusion  Assessment & Plan  · Noted on CXR  · Previously was drained approximately 5yrs ago- suspected due to microperforation after PPM placement at that time  · Echocardiogram ordered    Fall  Assessment & Plan  · Mechanical fall due to loose shoes  · Typically ambulates with a walker  · PT/OT ordered   · CT for head negative for bleed despite impact to left side of forehead  · Evaluated and cleared by trauma team for admission  · Recommending holding Coumadin, administering vitamin K IV given large hematoma of left knee  · Hb remains stable (mid-8)      VTE Pharmacologic Prophylaxis: VTE Score: 5 High Risk (Score >/= 5) - Pharmacological DVT Prophylaxis Contraindicated  Sequential Compression Devices Ordered  Patient Centered Rounds: I performed bedside rounds with nursing staff today  Discussions with Specialists or Other Care Team Provider: None, will discuss with Cardiology    Education and Discussions with Family / Patient: Attempted to update  (daughter) via phone  Left voicemail  Time Spent for Care: 30 minutes  More than 50% of total time spent on counseling and coordination of care as described above  Current Length of Stay: 1 day(s)  Current Patient Status: Inpatient   Certification Statement: The patient will continue to require additional inpatient hospital stay due to IV Bumex  Discharge Plan: Anticipate discharge in 48-72 hrs to discharge location to be determined pending rehab evaluations  Code Status: Level 1 - Full Code    Subjective:   Patient reports she feels well, breathing is easier than it was yesterday  She is tolerating her diet and reports significant urine output  Offers no complaints today  Says her leg pain is stable  Objective:     Vitals:   Temp (24hrs), Av 2 °F (36 8 °C), Min:97 9 °F (36 6 °C), Max:98 7 °F (37 1 °C)    Temp:  [97 9 °F (36 6 °C)-98 7 °F (37 1 °C)] 98 7 °F (37 1 °C)  HR:  [60-84] 60  Resp:  [16-29] 16  BP: (114-150)/(58-81) 121/61  SpO2:  [93 %-98 %] 93 %  Body mass index is 22 14 kg/m²  Input and Output Summary (last 24 hours): Intake/Output Summary (Last 24 hours) at 2022 1010  Last data filed at 2022 0901  Gross per 24 hour   Intake 1080 ml   Output 300 ml   Net 780 ml       Physical Exam:   Physical Exam  Vitals and nursing note reviewed  Constitutional:       General: She is not in acute distress  Appearance: Normal appearance  She is well-developed  HENT:      Head: Normocephalic and atraumatic  Eyes:      General: No scleral icterus  Conjunctiva/sclera: Conjunctivae normal    Cardiovascular:      Rate and Rhythm: Normal rate and regular rhythm  Heart sounds: No murmur heard  Pulmonary:      Effort: Pulmonary effort is normal       Breath sounds: Rales present  No wheezing or rhonchi  Abdominal:      General: There is no distension  Palpations: Abdomen is soft  Musculoskeletal:      Right lower le+ Pitting Edema present  Left lower le+ Pitting Edema present  Skin:     General: Skin is warm and dry  Neurological:      General: No focal deficit present  Mental Status: She is alert  Psychiatric:         Mood and Affect: Mood normal        Additional Data:     Labs:  Results from last 7 days   Lab Units 22  0503   WBC Thousand/uL 3 96*   HEMOGLOBIN g/dL 8 4*   HEMATOCRIT % 26 7*   PLATELETS Thousands/uL 152   NEUTROS PCT % 69   LYMPHS PCT % 18   MONOS PCT % 12   EOS PCT % 0     Results from last 7 days   Lab Units 22  0503   SODIUM mmol/L 136   POTASSIUM mmol/L 4 1   CHLORIDE mmol/L 105   CO2 mmol/L 24   BUN mg/dL 53*   CREATININE mg/dL 2 38*   ANION GAP mmol/L 7   CALCIUM mg/dL 7 8*   GLUCOSE RANDOM mg/dL 89     Results from last 7 days   Lab Units 22  1309   INR  1 87*                   Lines/Drains:  Invasive Devices  Report    Peripheral Intravenous Line            Peripheral IV 22 Right Antecubital <1 day                      Imaging: No pertinent imaging reviewed      Recent Cultures (last 7 days):         Last 24 Hours Medication List:   Current Facility-Administered Medications   Medication Dose Route Frequency Provider Last Rate    acetaminophen  650 mg Oral Q6H PRN Kaelyn DALEY PA-C      aluminum-magnesium hydroxide-simethicone  30 mL Oral Q6H PRN Kaelyn DALEY PA-C      bumetanide  2 mg Intravenous BID Jordan DALEY PA-C      vitamin B-12  250 mcg Oral Daily Kaelyn DALEY PA-C      ferrous sulfate  325 mg Oral Daily With Breakfast Jordan DALEY PA-C  fluticasone  1 spray Nasal Daily Davey Wallace PA-C      glycerin-hypromellose-  2 drop Ophthalmic Q3H PRN Davey Wallace PA-C      metoprolol succinate  50 mg Oral Daily [address] [facility]      ondansetron  4 mg Intravenous Q6H PRN Kaelyn DALEY PA-C      pantoprazole  40 mg Oral Daily [address] [facility]      potassium chloride  20 mEq Oral Daily Kaelyn DALEY PA-C      saccharomyces boulardii  250 mg Oral BID Davey Wallace PA-C          Today, Patient Was Seen By: Suha Balderas PA-C    **Please Note: This note may have been constructed using a voice recognition system  **

## 2022-05-08 NOTE — CONSULTS
Consultation - Cardiology   Sidney Pratt 80 y o  female MRN: [id number]  Unit/Bed#: -01 Encounter: [id number]    Assessment/Plan     Assessment:    Acute on Chronic Diastolic CHF  S/P PPM implant   Perm AF    Plan:    Continue with IV Bumex as currently ordered  She is feeling better and her LE edema has improved  Cr  Is above baseline but improving and dodwn to 2 3 baseline is 2  Trend weights, I/O and renal function  S/P PPM    Perm  AF: AC on hold due to anemia and ? Gi bleed noted by RN today  Monitor H and H      History of Present Illness   Physician Requesting Consult: Jessica Henderson DO  Reason for Consult / Principal Problem: CHF  HPI: Sidney Pratt is a 80y o  year old female who presents with worsening shortness of breath and LE edema  This has been ongoing issue over the past several weeks of increasing doses of diuretics and intermittent metolazone  More recently she did fall at home and has a contusion of her knee  She was started on diuretics and she is currently feeling better since admission   She currently has no chest pain or dyspnea at rest      Inpatient consult to Cardiology  Consult performed by: Astrid Vivas MD  Consult ordered by: Elan Carter PA-C          Review of Systems    Historical Information   Past Medical History:   Diagnosis Date    Anemia, unspecified     Basal cell carcinoma of overlapping sites of skin     Bronchial pneumonia     Cellulitis of left lower limb     Chronic venous hypertension     Conductive hearing loss, bilateral     Diverticulosis of large intestine without perforation or abscess without bleeding     Edema     Heart failure, unspecified (HCC)     Low back pain     Other abnormalities of gait and mobility     Other lack of coordination     Other specified arthritis, other site     Pain in left knee     Pleural effusion in other conditions classified elsewhere     Presence of cardiac pacemaker     Repeated falls     Unspecified abnormalities of gait and mobility     Unspecified atrial fibrillation [facility])      Past Surgical History:   Procedure Laterality Date    CARDIAC ELECTROPHYSIOLOGY PROCEDURE N/A 3/10/2022    Procedure: Cardiac pacer generator change- Single Chamber;  Surgeon: Barbara Perales MD;  Location: [facility]; Service: Cardiology     Social History     Substance and Sexual Activity   Alcohol Use Not Currently     Social History     Substance and Sexual Activity   Drug Use Not Currently     E-Cigarette/Vaping    E-Cigarette Use Never User      E-Cigarette/Vaping Substances    Nicotine No     THC No     CBD No     Flavoring No     Other No     Unknown No      Social History     Tobacco Use   Smoking Status Never Smoker   Smokeless Tobacco Never Used     Family History: History reviewed  No pertinent family history      Meds/Allergies   current meds:   Current Facility-Administered Medications   Medication Dose Route Frequency    acetaminophen (TYLENOL) tablet 650 mg  650 mg Oral Q6H PRN    aluminum-magnesium hydroxide-simethicone (MYLANTA) oral suspension 30 mL  30 mL Oral Q6H PRN    bumetanide (BUMEX) injection 2 mg  2 mg Intravenous BID    cyanocobalamin (VITAMIN B-12) tablet 250 mcg  250 mcg Oral Daily    ferrous sulfate tablet 325 mg  325 mg Oral Daily With Breakfast    fluticasone (FLONASE) 50 mcg/act nasal spray 1 spray  1 spray Nasal Daily    glycerin-hypromellose- (ARTIFICIAL TEARS) ophthalmic solution 2 drop  2 drop Ophthalmic Q3H PRN    metoprolol succinate (TOPROL-XL) 24 hr tablet 50 mg  50 mg Oral Daily    ondansetron (ZOFRAN) injection 4 mg  4 mg Intravenous Q6H PRN    pantoprazole (PROTONIX) EC tablet 40 mg  40 mg Oral Daily    potassium chloride (K-DUR,KLOR-CON) CR tablet 20 mEq  20 mEq Oral Daily    saccharomyces boulardii (FLORASTOR) capsule 250 mg  250 mg Oral BID     Allergies   Allergen Reactions    Meperidine      Demerol     Xarelto [Rivaroxaban]      Internal bleeding         Objective   Vitals: Blood pressure 136/61, pulse 73, temperature (!) 97 4 °F (36 3 °C), temperature source Oral, resp  rate 13, height 5' 3" (1 6 m), weight 56 7 kg (125 lb), SpO2 99 %  Orthostatic Blood Pressures      Most Recent Value   Blood Pressure 136/61 filed at 05/08/2022 1510   Patient Position - Orthostatic VS Lying filed at 05/08/2022 1510            Intake/Output Summary (Last 24 hours) at 5/8/2022 1550  Last data filed at 5/8/2022 1200  Gross per 24 hour   Intake 1320 ml   Output 300 ml   Net 1020 ml       Invasive Devices  Report    Peripheral Intravenous Line            Peripheral IV 05/07/22 Right Antecubital 1 day                Physical Exam    Lab Results:   I have personally reviewed pertinent lab results      CBC with diff:   Results from last 7 days   Lab Units 05/09/22  0257   WBC Thousand/uL 3 80*   RBC Million/uL 2 30*   HEMOGLOBIN g/dL 8 5*   HEMATOCRIT % 26 3*   MCV fL 114*   MCH pg 37 0*   MCHC g/dL 32 3   RDW % 16 7*   MPV fL 10 9   PLATELETS Thousands/uL 147*     CMP:   Results from last 7 days   Lab Units 05/09/22  0257   SODIUM mmol/L 137   CHLORIDE mmol/L 105   CO2 mmol/L 24   BUN mg/dL 54*   CREATININE mg/dL 2 18*   CALCIUM mg/dL 7 5*   EGFR ml/min/1 73sq m 18     HS Troponin: No results found for: HSTNI, HSTNI0, HSTNI2, HSTNI4  BNP:   Results from last 7 days   Lab Units 05/09/22  0257   POTASSIUM mmol/L 3 9   CHLORIDE mmol/L 105   CO2 mmol/L 24   BUN mg/dL 54*   CREATININE mg/dL 2 18*   CALCIUM mg/dL 7 5*   EGFR ml/min/1 73sq m 18     Coags:   Results from last 7 days   Lab Units 05/07/22  1309   PTT seconds 32   INR  1 87*     TSH:     Magnesium:     Lipid Profile:     Imaging: I have personally reviewed pertinent films in PACS  EKG: ventricular pacing   VTE Prophylaxis: Warfarin (Coumadin)    Code Status: Level 1 - Full Code  Advance Directive and Living Will:      Power of :    POLST:      Counseling / Coordination of Care  Total floor / unit time spent today 45 minutes  Greater than 50% of total time was spent with the patient and / or family counseling and / or coordination of care  A description of the counseling / coordination of care

## 2022-05-08 NOTE — PLAN OF CARE
Problem: MOBILITY - ADULT  Goal: Maintain or return to baseline ADL function  Description: INTERVENTIONS:  -  Assess patient's ability to carry out ADLs; assess patient's baseline for ADL function and identify physical deficits which impact ability to perform ADLs (bathing, care of mouth/teeth, toileting, grooming, dressing, etc )  - Assess/evaluate cause of self-care deficits   - Assess range of motion  - Assess patient's mobility; develop plan if impaired  - Assess patient's need for assistive devices and provide as appropriate  - Encourage maximum independence but intervene and supervise when necessary  - Involve family in performance of ADLs  - Assess for home care needs following discharge   - Consider OT consult to assist with ADL evaluation and planning for discharge  - Provide patient education as appropriate  Outcome: Progressing  Goal: Maintains/Returns to pre admission functional level  Description: INTERVENTIONS:  - Perform BMAT or MOVE assessment daily    - Set and communicate daily mobility goal to care team and patient/family/caregiver  - Collaborate with rehabilitation services on mobility goals if consulted  - Perform Range of Motion 2 times a day  - Reposition patient every 2 hours    - Dangle patient 2 times a day  - Stand patient 2 times a day  - Ambulate patient 2 times a day  - Out of bed to chair 2 times a day   - Out of bed for meals 2 times a day  - Out of bed for toileting  - Record patient progress and toleration of activity level   Outcome: Progressing     Problem: Prexisting or High Potential for Compromised Skin Integrity  Goal: Skin integrity is maintained or improved  Description: INTERVENTIONS:  - Identify patients at risk for skin breakdown  - Assess and monitor skin integrity  - Assess and monitor nutrition and hydration status  - Monitor labs   - Assess for incontinence   - Turn and reposition patient  - Assist with mobility/ambulation  - Relieve pressure over bony prominences  - Avoid friction and shearing  - Provide appropriate hygiene as needed including keeping skin clean and dry  - Evaluate need for skin moisturizer/barrier cream  - Collaborate with interdisciplinary team   - Patient/family teaching  - Consider wound care consult   Outcome: Progressing     Problem: Nutrition/Hydration-ADULT  Goal: Nutrient/Hydration intake appropriate for improving, restoring or maintaining nutritional needs  Description: Monitor and assess patient's nutrition/hydration status for malnutrition  Collaborate with interdisciplinary team and initiate plan and interventions as ordered  Monitor patient's weight and dietary intake as ordered or per policy  Utilize nutrition screening tool and intervene as necessary  Determine patient's food preferences and provide high-protein, high-caloric foods as appropriate       INTERVENTIONS:  - Monitor oral intake, urinary output, labs, and treatment plans  - Assess nutrition and hydration status and recommend course of action  - Evaluate amount of meals eaten  - Assist patient with eating if necessary   - Allow adequate time for meals  - Recommend/ encourage appropriate diets, oral nutritional supplements, and vitamin/mineral supplements  - Order, calculate, and assess calorie counts as needed  - Recommend, monitor, and adjust tube feedings and TPN/PPN based on assessed needs  - Assess need for intravenous fluids  - Provide specific nutrition/hydration education as appropriate  - Include patient/family/caregiver in decisions related to nutrition  Outcome: Progressing     Problem: Potential for Falls  Goal: Patient will remain free of falls  Description: INTERVENTIONS:  - Educate patient/family on patient safety including physical limitations  - Instruct patient to call for assistance with activity   - Consult OT/PT to assist with strengthening/mobility   - Keep Call bell within reach  - Keep bed low and locked with side rails adjusted as appropriate  - Keep care items and personal belongings within reach  - Initiate and maintain comfort rounds  - Make Fall Risk Sign visible to staff  - Offer Toileting every 2 Hours, in advance of need  - Initiate/Maintain bed alarm  - Obtain necessary fall risk management equipment:   - Apply yellow socks and bracelet for high fall risk patients  - Consider moving patient to room near nurses station  Outcome: Progressing

## 2022-05-09 ENCOUNTER — APPOINTMENT (INPATIENT)
Dept: NON INVASIVE DIAGNOSTICS | Facility: HOSPITAL | Age: 87
DRG: 604 | End: 2022-05-09
Payer: MEDICARE

## 2022-05-09 LAB
ANION GAP SERPL CALCULATED.3IONS-SCNC: 8 MMOL/L (ref 4–13)
AORTIC ROOT: 3.5 CM
AORTIC VALVE MEAN VELOCITY: 12.9 M/S
AV AREA BY CONTINUOUS VTI: 0.6 CM2
AV AREA PEAK VELOCITY: 0.5 CM2
AV LVOT MEAN GRADIENT: 0 MMHG
AV LVOT PEAK GRADIENT: 1 MMHG
AV MEAN GRADIENT: 8 MMHG
AV PEAK GRADIENT: 15 MMHG
AV VALVE AREA: 0.61 CM2
AV VELOCITY RATIO: 0.21
BASOPHILS # BLD AUTO: 0.01 THOUSANDS/ΜL (ref 0–0.1)
BASOPHILS NFR BLD AUTO: 0 % (ref 0–1)
BUN SERPL-MCNC: 54 MG/DL (ref 5–25)
CALCIUM SERPL-MCNC: 7.5 MG/DL (ref 8.3–10.1)
CHLORIDE SERPL-SCNC: 105 MMOL/L (ref 100–108)
CO2 SERPL-SCNC: 24 MMOL/L (ref 21–32)
CREAT SERPL-MCNC: 2.18 MG/DL (ref 0.6–1.3)
DOP CALC AO PEAK VEL: 1.92 M/S
DOP CALC AO VTI: 36.27 CM
DOP CALC LVOT AREA: 2.54 CM2
DOP CALC LVOT DIAMETER: 1.8 CM
DOP CALC LVOT PEAK VEL VTI: 8.74 CM
DOP CALC LVOT PEAK VEL: 0.41 M/S
DOP CALC LVOT STROKE INDEX: 14.5 ML/M2
DOP CALC LVOT STROKE VOLUME: 22.23 CM3
EOSINOPHIL # BLD AUTO: 0.02 THOUSAND/ΜL (ref 0–0.61)
EOSINOPHIL NFR BLD AUTO: 1 % (ref 0–6)
ERYTHROCYTE [DISTWIDTH] IN BLOOD BY AUTOMATED COUNT: 16.7 % (ref 11.6–15.1)
FRACTIONAL SHORTENING: 26 % (ref 28–44)
GFR SERPL CREATININE-BSD FRML MDRD: 18 ML/MIN/1.73SQ M
GLUCOSE SERPL-MCNC: 91 MG/DL (ref 65–140)
HCT VFR BLD AUTO: 26.3 % (ref 34.8–46.1)
HCT VFR BLD AUTO: 28.1 % (ref 34.8–46.1)
HGB BLD-MCNC: 8.5 G/DL (ref 11.5–15.4)
HGB BLD-MCNC: 8.8 G/DL (ref 11.5–15.4)
IMM GRANULOCYTES # BLD AUTO: 0.01 THOUSAND/UL (ref 0–0.2)
IMM GRANULOCYTES NFR BLD AUTO: 0 % (ref 0–2)
INTERVENTRICULAR SEPTUM IN DIASTOLE (PARASTERNAL SHORT AXIS VIEW): 1 CM
INTERVENTRICULAR SEPTUM: 1 CM (ref 0.49–0.91)
LAAS-AP4: 32.8 CM2
LEFT ATRIUM SIZE: 5.6 CM
LEFT INTERNAL DIMENSION IN SYSTOLE: 3.4 CM (ref 2.29–3.46)
LEFT VENTRICULAR INTERNAL DIMENSION IN DIASTOLE: 4.6 CM (ref 3.72–5.54)
LEFT VENTRICULAR POSTERIOR WALL IN END DIASTOLE: 1 CM (ref 0.47–0.9)
LEFT VENTRICULAR STROKE VOLUME: 49 ML
LVSV (TEICH): 49 ML
LYMPHOCYTES # BLD AUTO: 0.94 THOUSANDS/ΜL (ref 0.6–4.47)
LYMPHOCYTES NFR BLD AUTO: 25 % (ref 14–44)
MCH RBC QN AUTO: 37 PG (ref 26.8–34.3)
MCHC RBC AUTO-ENTMCNC: 32.3 G/DL (ref 31.4–37.4)
MCV RBC AUTO: 114 FL (ref 82–98)
MONOCYTES # BLD AUTO: 0.48 THOUSAND/ΜL (ref 0.17–1.22)
MONOCYTES NFR BLD AUTO: 13 % (ref 4–12)
NEUTROPHILS # BLD AUTO: 2.34 THOUSANDS/ΜL (ref 1.85–7.62)
NEUTS SEG NFR BLD AUTO: 61 % (ref 43–75)
NRBC BLD AUTO-RTO: 1 /100 WBCS
PLATELET # BLD AUTO: 147 THOUSANDS/UL (ref 149–390)
PMV BLD AUTO: 10.9 FL (ref 8.9–12.7)
POTASSIUM SERPL-SCNC: 3.9 MMOL/L (ref 3.5–5.3)
RBC # BLD AUTO: 2.3 MILLION/UL (ref 3.81–5.12)
RIGHT ATRIAL 2D VOLUME: 252 ML
RIGHT ATRIUM AREA SYSTOLE A4C: 52.6 CM2
SL CV LV EF: 55
SL CV PED ECHO LEFT VENTRICLE DIASTOLIC VOLUME (MOD BIPLANE) 2D: 95 ML
SL CV PED ECHO LEFT VENTRICLE SYSTOLIC VOLUME (MOD BIPLANE) 2D: 46 ML
SODIUM SERPL-SCNC: 137 MMOL/L (ref 136–145)
TR MAX PG: 25 MMHG
TR PEAK VELOCITY: 2.5 M/S
TRICUSPID VALVE PEAK REGURGITATION VELOCITY: 2.48 M/S
WBC # BLD AUTO: 3.8 THOUSAND/UL (ref 4.31–10.16)
Z-SCORE OF INTERVENTRICULAR SEPTUM IN END DIASTOLE: 2.79
Z-SCORE OF LEFT VENTRICULAR DIMENSION IN END DIASTOLE: 0.13
Z-SCORE OF LEFT VENTRICULAR DIMENSION IN END SYSTOLE: 1.49
Z-SCORE OF LEFT VENTRICULAR POSTERIOR WALL IN END DIASTOLE: 2.9

## 2022-05-09 PROCEDURE — 80048 BASIC METABOLIC PNL TOTAL CA: CPT | Performed by: INTERNAL MEDICINE

## 2022-05-09 PROCEDURE — 97163 PT EVAL HIGH COMPLEX 45 MIN: CPT

## 2022-05-09 PROCEDURE — 85018 HEMOGLOBIN: CPT | Performed by: PHYSICIAN ASSISTANT

## 2022-05-09 PROCEDURE — 97167 OT EVAL HIGH COMPLEX 60 MIN: CPT

## 2022-05-09 PROCEDURE — 85014 HEMATOCRIT: CPT | Performed by: PHYSICIAN ASSISTANT

## 2022-05-09 PROCEDURE — 99232 SBSQ HOSP IP/OBS MODERATE 35: CPT | Performed by: PHYSICIAN ASSISTANT

## 2022-05-09 PROCEDURE — 85025 COMPLETE CBC W/AUTO DIFF WBC: CPT | Performed by: INTERNAL MEDICINE

## 2022-05-09 PROCEDURE — 93306 TTE W/DOPPLER COMPLETE: CPT | Performed by: INTERNAL MEDICINE

## 2022-05-09 PROCEDURE — 99232 SBSQ HOSP IP/OBS MODERATE 35: CPT | Performed by: INTERNAL MEDICINE

## 2022-05-09 PROCEDURE — 93306 TTE W/DOPPLER COMPLETE: CPT

## 2022-05-09 RX ORDER — WARFARIN SODIUM 5 MG/1
5 TABLET ORAL
Status: COMPLETED | OUTPATIENT
Start: 2022-05-09 | End: 2022-05-09

## 2022-05-09 RX ORDER — METOPROLOL SUCCINATE 25 MG/1
25 TABLET, EXTENDED RELEASE ORAL DAILY
Status: DISCONTINUED | OUTPATIENT
Start: 2022-05-10 | End: 2022-05-16 | Stop reason: HOSPADM

## 2022-05-09 RX ORDER — WARFARIN SODIUM 2 MG/1
2 TABLET ORAL
Status: DISCONTINUED | OUTPATIENT
Start: 2022-05-10 | End: 2022-05-11

## 2022-05-09 RX ORDER — BUMETANIDE 0.25 MG/ML
3 INJECTION, SOLUTION INTRAMUSCULAR; INTRAVENOUS 2 TIMES DAILY
Status: COMPLETED | OUTPATIENT
Start: 2022-05-09 | End: 2022-05-12

## 2022-05-09 RX ADMIN — FERROUS SULFATE TAB 325 MG (65 MG ELEMENTAL FE) 325 MG: 325 (65 FE) TAB at 10:03

## 2022-05-09 RX ADMIN — WARFARIN SODIUM 5 MG: 5 TABLET ORAL at 17:53

## 2022-05-09 RX ADMIN — METOPROLOL SUCCINATE 50 MG: 50 TABLET, EXTENDED RELEASE ORAL at 10:03

## 2022-05-09 RX ADMIN — Medication 250 MG: at 17:53

## 2022-05-09 RX ADMIN — POTASSIUM CHLORIDE 20 MEQ: 20 TABLET, EXTENDED RELEASE ORAL at 10:02

## 2022-05-09 RX ADMIN — Medication 250 MG: at 10:03

## 2022-05-09 RX ADMIN — FLUTICASONE PROPIONATE 1 SPRAY: 50 SPRAY, METERED NASAL at 10:10

## 2022-05-09 RX ADMIN — CYANOCOBALAMIN TAB 500 MCG 250 MCG: 500 TAB at 10:03

## 2022-05-09 RX ADMIN — PANTOPRAZOLE SODIUM 40 MG: 40 TABLET, DELAYED RELEASE ORAL at 10:03

## 2022-05-09 RX ADMIN — BUMETANIDE 3 MG: 0.25 INJECTION, SOLUTION INTRAMUSCULAR; INTRAVENOUS at 17:52

## 2022-05-09 RX ADMIN — BUMETANIDE 2 MG: 0.25 INJECTION, SOLUTION INTRAMUSCULAR; INTRAVENOUS at 12:12

## 2022-05-09 NOTE — CASE MANAGEMENT
Case Management Assessment & Discharge Planning Note    Patient name Viridiana Acosta  Location [address] 227/-45 MRN [id number]  : 1923 Date 2022       Current Admission Date: 2022  Current Admission Diagnosis:Acute on chronic combined systolic and diastolic CHF (congestive heart failure) [facility])   Patient Active Problem List    Diagnosis Date Noted    Acute on chronic combined systolic and diastolic CHF (congestive heart failure) ([facility] ) 2022    Fall 2022    CAMPBELL (acute kidney injury) ([place] [address] ) 2022    Pacemaker at end of battery life 03/10/2022    Chronic diastolic CHF (congestive heart failure) ([place] [address] ) 2021    Permanent atrial fibrillation ([place] [address] ) 2014    Pericardial effusion 2014    Coronary artery disease 2013    Hyperlipidemia 08/15/2012    Hypertension 2012      LOS (days): 2  Geometric Mean LOS (GMLOS) (days): 3 80  Days to GMLOS:1 8     OBJECTIVE:    Risk of Unplanned Readmission Score: 20         Current admission status: Inpatient       Preferred Pharmacy:   [address] [address]  Phone: [phone] Fax: [phone]    Primary Care Provider: No primary care provider on file      Primary Insurance: MEDICARE  Secondary Insurance:  FOR LIFE    ASSESSMENT:  [address], [address] Representative - Daughter   Primary Phone: [phone] (Mobile)               Advance Directives  Does patient have a [address] [facility]?: Yes (document requested)  Does patient have Advance Directives?: Yes  Advance Directives: Power of  for health care    Readmission Root Cause  30 Day Readmission: No    Patient Information  Admitted from[de-identified] Facility ([facility])  Mental Status: Alert  During Assessment patient was accompanied by: Not accompanied during assessment  Assessment information provided by[de-identified] Patient  Support Systems: Family members  Home entry access options   Select all that apply : No steps to enter home  Type of Current Residence: Facility ([facility])  Upon entering residence, is there a bedroom on the main floor (no further steps)?: Yes  Upon entering residence, is there a bathroom on the main floor (no further steps)?: Yes  In the last 12 months, was there a time when you were not able to pay the mortgage or rent on time?: No  In the last 12 months, how many places have you lived?: 1  In the last 12 months, was there a time when you did not have a steady place to sleep or slept in a shelter (including now)?: No  Homeless/housing insecurity resource given?: N/A  Living Arrangements: Lives Alone  Is patient a ?: No    Activities of Daily Living Prior to Admission  Functional Status: Independent  Completes ADLs independently?: Yes  Ambulates independently?: Yes  Does patient use assisted devices?: Yes  Assisted Devices (DME) used: [employer]  Does patient currently own DME?: Yes  What DME does the patient currently own?: [employer]  Does patient have a history of Outpatient Therapy (PT/OT)?: No  Does the patient have a history of Short-Term Rehab?: Yes (hx of rehab at [facility])  Does patient have a history of HHC?: No  Does patient currently have [facility]?: No    Patient Information Continued  Does patient have prescription coverage?: Yes  Within the past 12 months, you worried that your food would run out before you got the money to buy more : Never true  Within the past 12 months, the food you bought just didnt last and you didnt have money to get more : Never true  Food insecurity resource given?: N/A  Does patient receive dialysis treatments?: No  Does patient have a history of substance abuse?: No  Does patient have a history of Mental Health Diagnosis?: No    Means of Transportation  Means of Transport to Appts[de-identified] Family transport  In the past 12 months, has lack of transportation kept you from medical appointments or from getting medications?: No  In the past 12 months, has lack of transportation kept you from meetings, work, or from getting things needed for daily living?: No  Was application for public transport provided?: N/A    DISCHARGE DETAILS:    Discharge planning discussed with[de-identified] patient  Freedom of Choice: Yes  Comments - Freedom of Choice: CM discussed recommendation for home therapy; pt and daughter are agreeable  CM contacted family/caregiver?: Yes  Were Treatment Team discharge recommendations reviewed with patient/caregiver?: Yes  Did patient/caregiver verbalize understanding of patient care needs?: Yes  Were patient/caregiver advised of the risks associated with not following Treatment Team discharge recommendations?: Yes    Contacts  Patient Contacts:  Tamika Kidd (daughter)  Relationship to Patient[de-identified] Family  Contact Method: Phone  Phone Number: [phone]  Reason/Outcome: [address]         Is the patient interested in [facility] at discharge?: Yes  Via Jimmy Fischer requested[de-identified] [address] [place] Name[de-identified] Other ([facility])  [address] [place] [address] Provider[de-identified] PCP  Home Health Services Needed[de-identified] Strengthening/Theraputic Exercises to Improve Function,Gait/ADL Training,Evaluate Functional Status and Safety,Wound/Ostomy Care  Homebound Criteria Met[de-identified] Uses an Assist Device (i e  cane, walker, etc)  Supporting Clincal Findings[de-identified] Limited Endurance    DME Referral Provided  Referral made for DME?: No    Other Referral/Resources/Interventions Provided:  Interventions: C  Referral Comments: Referral to [facility]    [facility]  Discharge Destination Plan[de-identified] Home with Alix at Discharge : Family     IMM Given (Date):: 05/09/22  IMM Given to[de-identified] Patient     Additional Comments: Received a call from [address] at [facility] who states pt resides in their shelter  CM discussed pt's prior level of functioning; pt performed ADL's indptly pta, pt uses a RW for ambulation  Met with pt to discuss dc plan; pt confirmed same  CM discussed therapies recommendation for home therapy  Pt and Nevaeh oLpez are both agreeable  Nevaeh Lopez states they use [facility]; referral sent  Pt requested CM called her daughter [facility] to provide update; Marva updated  [facility] states herself or her brother will transport pt to [facility] when medically stable

## 2022-05-09 NOTE — PROGRESS NOTES
[place]  Progress Note Marisol Elder 2/28/1923, 80 y o  female MRN: [id number]  Unit/Bed#: -01 Encounter: [id number]  Primary Care Provider: No primary care provider on file  Date and time admitted to hospital: 5/7/2022 12:51 PM    * Acute on chronic combined systolic and diastolic CHF (congestive heart failure) ([facility])  Assessment & Plan  Wt Readings from Last 3 Encounters:   05/09/22 56 9 kg (125 lb 7 1 oz)   03/25/22 58 5 kg (129 lb)   03/10/22 53 6 kg (118 lb 2 7 oz)     · Patient has been having medications adjusted outpatient due to ongoing fluid retention in lower extremeties  · Weight as of 3/10/22: 118lbs   Patient reports her weight had gone up to 130lbs  · On torsemide 40mg BID with 2 5mg PRN metolazone  · 2mg Bumex BID  · Daily weights  · Intake and output (suspect this is inaccurate)  · -149cc since admission   · -4lbs  · Cardiology Consulted        Permanent atrial fibrillation [facility])  Assessment & Plan  · Bi-V dual PPM Last interrogation 3/2022- functioning normally  · Managed on coumadin 2 5mg daily, 5mg Mondays  · Currently holding given hematoma  · Trend INR daily    CAMPBELL (acute kidney injury) ([facility] )  Assessment & Plan  · 1 95 3/10/2022, prior to this in 2014, baseline was approximately 1 3-1 5  · Suspect due to volume overload  · 2 54 at time of admission  · Trend BMP daily with diuresis- improving, 2 18 today    Pericardial effusion  Assessment & Plan  · Noted on CXR  · Previously was drained approximately 5yrs ago- suspected due to microperforation after PPM placement at that time  · Echocardiogram ordered    Fall  Assessment & Plan  · Mechanical fall due to loose shoes  · Typically ambulates with a walker  · PT/OT ordered   · CT for head negative for bleed despite impact to left side of forehead  · Evaluated and cleared by trauma team for admission  · Recommending holding Coumadin, administered vitamin K IV given large hematoma of left knee  · Hb remains stable (mid-8)      VTE Pharmacologic Prophylaxis: VTE Score: 5 High Risk (Score >/= 5) - Pharmacological DVT Prophylaxis Contraindicated  Sequential Compression Devices Ordered  Patient Centered Rounds: I performed bedside rounds with nursing staff today  Discussions with Specialists or Other Care Team Provider: Discussed with Cardiology, will need to continue IV Bumex for at least another day    Education and Discussions with Family / Patient: Patient declined call to   Time Spent for Care: 30 minutes  More than 50% of total time spent on counseling and coordination of care as described above  Current Length of Stay: 2 day(s)  Current Patient Status: Inpatient   Certification Statement: The patient will continue to require additional inpatient hospital stay due to diuresis  Discharge Plan: Anticipate discharge in 24-48 hrs to home  Code Status: Level 1 - Full Code    Subjective:   Patient says she wants to go home, says her legs have not looked this good for 6 months  Discussed that there is still significant amount of fluid she needs to lose  She is agreeable to remain inpatient for further diuresis  Objective:     Vitals:   Temp (24hrs), Av 1 °F (36 7 °C), Min:97 4 °F (36 3 °C), Max:99 °F (37 2 °C)    Temp:  [97 4 °F (36 3 °C)-99 °F (37 2 °C)] 98 °F (36 7 °C)  HR:  [62-73] 66  Resp:  [13-18] 18  BP: (102-136)/(56-61) 117/56  SpO2:  [96 %-100 %] 100 %  Body mass index is 22 22 kg/m²  Input and Output Summary (last 24 hours): Intake/Output Summary (Last 24 hours) at 2022 1047  Last data filed at 2022 0756  Gross per 24 hour   Intake 440 ml   Output 1369 ml   Net -929 ml       Physical Exam:   Physical Exam  Vitals and nursing note reviewed  Constitutional:       General: She is not in acute distress  Appearance: Normal appearance  She is well-developed  HENT:      Head: Normocephalic and atraumatic  Eyes:      General: No scleral icterus  Conjunctiva/sclera: Conjunctivae normal    Cardiovascular:      Rate and Rhythm: Normal rate and regular rhythm  Heart sounds: No murmur heard  Pulmonary:      Effort: Pulmonary effort is normal       Breath sounds: Examination of the left-lower field reveals rales  Rales present  No wheezing or rhonchi  Abdominal:      General: There is no distension  Palpations: Abdomen is soft  Musculoskeletal:      Right lower le+ Pitting Edema present  Left lower le+ Pitting Edema present  Skin:     General: Skin is warm and dry  Neurological:      General: No focal deficit present  Mental Status: She is alert     Psychiatric:         Mood and Affect: Mood normal         Additional Data:     Labs:  Results from last 7 days   Lab Units 22  0257   WBC Thousand/uL 3 80*   HEMOGLOBIN g/dL 8 5*   HEMATOCRIT % 26 3*   PLATELETS Thousands/uL 147*   NEUTROS PCT % 61   LYMPHS PCT % 25   MONOS PCT % 13*   EOS PCT % 1     Results from last 7 days   Lab Units 22  0257   SODIUM mmol/L 137   POTASSIUM mmol/L 3 9   CHLORIDE mmol/L 105   CO2 mmol/L 24   BUN mg/dL 54*   CREATININE mg/dL 2 18*   ANION GAP mmol/L 8   CALCIUM mg/dL 7 5*   GLUCOSE RANDOM mg/dL 91     Results from last 7 days   Lab Units 22  1309   INR  1 87*                   Lines/Drains:  Invasive Devices  Report    Peripheral Intravenous Line            Peripheral IV 22 Right Antecubital 1 day                      Imaging: Reviewed radiology reports from this admission including: CT LE    Recent Cultures (last 7 days):         Last 24 Hours Medication List:   Current Facility-Administered Medications   Medication Dose Route Frequency Provider Last Rate    acetaminophen  650 mg Oral Q6H PRN Kaelyn DALEY PA-C      aluminum-magnesium hydroxide-simethicone  30 mL Oral Q6H PRN Kaelyn DALEY PA-C      bumetanide  2 mg Intravenous BID Jordan DALEY PA-C      vitamin B-12  250 mcg Oral Daily Kaelyn Irizarry V EDER      ferrous sulfate  325 mg Oral Daily With Breakfast [address], EDER      fluticasone  1 spray Nasal Daily Diana Escobar PA-C      glycerin-hypromellose-  2 drop Ophthalmic Q3H  [address] V, EDER      metoprolol succinate  50 mg Oral Daily [address] V, EDER      ondansetron  4 mg Intravenous Q6H PRN Kaelyn Irizarry V, EDER      pantoprazole  40 mg Oral Daily [address] V, EDER      potassium chloride  20 mEq Oral Daily Kaelyn Irizarry V, EDER      saccharomyces boulardii  250 mg Oral BID Diana Escobar PA-C          Today, Patient Was Seen By: Hank Brooks PA-C    **Please Note: This note may have been constructed using a voice recognition system  **

## 2022-05-09 NOTE — OCCUPATIONAL THERAPY NOTE
Occupational Therapy Evaluation     Patient Name: Viridiana Acosta  Today's Date: 5/9/2022  Problem List  Principal Problem:    Acute on chronic combined systolic and diastolic CHF (congestive heart failure) ([facility] [address] )  Active Problems:    Fall    Permanent atrial fibrillation ([facility] )    Pericardial effusion    CAMPBELL (acute kidney injury) ([facility] )    Past Medical History  Past Medical History:   Diagnosis Date    Anemia, unspecified     Basal cell carcinoma of overlapping sites of skin     Bronchial pneumonia     Cellulitis of left lower limb     Chronic venous hypertension     Conductive hearing loss, bilateral     Diverticulosis of large intestine without perforation or abscess without bleeding     Edema     Heart failure, unspecified (HCC)     Low back pain     Other abnormalities of gait and mobility     Other lack of coordination     Other specified arthritis, other site     Pain in left knee     Pleural effusion in other conditions classified elsewhere     Presence of cardiac pacemaker     Repeated falls     Unspecified abnormalities of gait and mobility     Unspecified atrial fibrillation ([facility] )      Past Surgical History  Past Surgical History:   Procedure Laterality Date    CARDIAC ELECTROPHYSIOLOGY PROCEDURE N/A 3/10/2022    Procedure: Cardiac pacer generator change- Single Chamber;  Surgeon: Evan Gould MD;  Location: [facility]; Service: Cardiology           05/09/22 0948   OT Last Visit   OT Visit Date 05/09/22   Note Type   Note type Evaluation   Restrictions/Precautions   Other Precautions Fall Risk; Chair Alarm   Pain Assessment   Pain Assessment Tool Urena-Baker FACES   Pain Score No Pain   Home Living   Type of Home Assisted living  ([facility])   Home Equipment Walker   Prior Function   Level of [place] Independent with ADLs and functional mobility   Lives With Facility staff   Receives Help From Personal care attendant   ADL Assistance Independent  (Pt reports supervision with bathing)   IADLs Needs assistance   Falls in the last 6 months 1 to 4   Vocational Retired   Comments Pt reports she walks down to [employer] with RW   Subjective   Subjective Pt received supine in bed, pleasant   ADL   Eating Assistance 7  Independent   Grooming Assistance 7  Independent   UB Bathing Assistance 5  Supervision/Setup   LB Bathing Assistance 3  Moderate Assistance   UB Dressing Assistance 5  Supervision/Setup   LB Dressing Assistance 3  Moderate Assistance   Toileting Assistance  3  Moderate Assistance   Bed Mobility   Supine to Sit 5  Supervision   Transfers   Sit to Stand 4  Minimal assistance   Additional items Assist x 1   Stand to Sit 5  Supervision   Additional items Verbal cues   Stand pivot 4  Minimal assistance   Additional items Verbal cues; Assist x 1  (RW)   Balance   Static Sitting Good   Dynamic Sitting Fair +   Static Standing Fair +   Dynamic Standing Fair   Activity Tolerance   Activity Tolerance Patient tolerated treatment well   Medical Staff Made Aware PT Adelina   Nurse Made Aware RN Kirsten Fontaine   RUE Assessment   RUE Assessment WFL   LUE Assessment   LUE Assessment WFL   Cognition   Overall Cognitive Status WFL   Arousal/Participation Alert   Attention Within functional limits   Orientation Level Oriented X4   Memory Within functional limits   Following Commands Follows all commands and directions without difficulty   Assessment   Limitation Decreased ADL status   Prognosis Good   Assessment Pt is a 80 y o  female seen for OT evaluation at [place] [address], admitted 5/7/2022 w/ Acute on chronic combined systolic and diastolic CHF (congestive heart failure) ([facility] [address] ), BLE edema, recent fall w/ left knee hematoma  OT completed extensive review of pt's medical and social history  Comorbidities affecting pt's functional performance at time of assessment include: PPM, HTN, CAD, CAMPBELL, and permanent a-fib   Personal factors affecting pt at time of IE include:difficulty performing ADLS and decreased functional mobility  Prior to admission, pt was living at [address] [place] [address] assisted living facility  Pt independent for ADLs except for requiring supervision for bathing  Pt also relied on use of RW and reports being able to walk to dining corona independently  Upon evaluation: Pt requires supervision for bed mobility, Min A x1 for functional mobility/transfers, supervision for UB ADLs and Mod A for LB ADLS 2* the following deficits impacting occupational performance: weakness, decreased balance and increased BLE edema  Pt to benefit from continued skilled OT tx while in the hospital to address deficits as defined above and maximize level of functional independence w ADL's and functional mobility  Occupational Performance areas to address include: bathing/shower, toilet hygiene, dressing and functional mobility  Based on findings, pt is of high complexity  The patient's raw score on the AM-PAC Daily Activity inpatient short form is 15, standardized score is 37 26, less than 39 4  Patients at this level are likely to benefit from DC to post-acute rehabilitation services  However please refer to therapist recommendation for discharge planning given other factors that may influence destination  At this time, OT recommendations at time of discharge are home OT  Pt would also benefit from 1 person assist for all transfers at this time as well as increased assist for ADLs  If facility if able to provide this, pt may return safely to current assisted living facility  Spoke with BELKIS Hernandez re: plan  Goals   Patient Goals Pt wishes to return home   Plan   Treatment Interventions ADL retraining;Functional transfer training; Compensatory technique education   Goal Expiration Date 05/19/22   OT Treatment Day 0   OT Frequency 2-3x/wk   Recommendation   OT Discharge Recommendation Home with home health rehabilitation  (Requires increased assist for ADLs and all tranfers)   AM-PAC Daily Activity Inpatient   Lower Body Dressing 2   Bathing 2   Toileting 2   Upper Body Dressing 3   Grooming 4   Eating 4   Daily Activity Raw Score 17   Daily Activity Standardized Score (Calc for Raw Score >=11) 37 26   AM-PAC Applied Cognition Inpatient   Following a Speech/Presentation 4   Understanding Ordinary Conversation 4   Taking Medications 3   Remembering Where Things Are Placed or Put Away 4   Remembering List of 4-5 Errands 4   Taking Care of Complicated Tasks 3   Applied Cognition Raw Score 22   Applied Cognition Standardized Score 47 83         Pt will achieve the following goals within 10 days  *Pt will complete LB bathing and dressing with supervision   * Pt will complete toileting w/ supervision w/ G hygiene/thoroughness using DME PRN  *Pt will complete bed mobility with independence, with bed flat and no side rail to prep for purposeful tasks    *Pt will perform functional transfers with on/off all surfaces with supervision using DME as needed w/ G balance/safety  *Pt will increase standing tolerance to 5 minutes in order to complete sinkside ADL   *Pt will identify 3-5 fall risks during ADL routine to ensure home safety upon discharge  *Pt will improve functional mobility during ADL/IADL/leisure tasks to sup using DME as needed w/ G balance/safety

## 2022-05-09 NOTE — ASSESSMENT & PLAN NOTE
· 1 95 3/10/2022, prior to this in 2014, baseline was approximately 1 3-1 5  · Suspect due to volume overload  · 2 54 at time of admission  · Trend BMP daily with diuresis- improving, 2 18 today

## 2022-05-09 NOTE — PROGRESS NOTES
Cardiology Progress Note - Rj [place] 80 y o  female MRN: [id number]    Unit/Bed#: -01 Encounter: [id number]        Subjective:    No significant events overnight  No chest pain  Dyspnea this am but feeling better  Review of Systems   Constitutional: Negative for malaise/fatigue  Cardiovascular: Negative for chest pain  Respiratory: Positive for shortness of breath  Objective:   Vitals: Blood pressure 102/58, pulse 62, temperature 98 °F (36 7 °C), temperature source Oral, resp  rate 18, height 5' 3" (1 6 m), weight 56 9 kg (125 lb 7 1 oz), SpO2 97 %  , Body mass index is 22 22 kg/m² ,   Orthostatic Blood Pressures      Most Recent Value   Blood Pressure 102/58 filed at 05/09/2022 0756   Patient Position - Orthostatic VS Sitting filed at 05/09/2022 4672         Systolic (01HJW), JTN:544 , Min:102 , SZA:366     Diastolic (18CGB), JIZ:80, Min:58, Max:61      Intake/Output Summary (Last 24 hours) at 5/9/2022 0844  Last data filed at 5/9/2022 0456  Gross per 24 hour   Intake 720 ml   Output 1369 ml   Net -649 ml     Weight (last 2 days)     Date/Time Weight    05/09/22 0456 56 9 (125 44)    05/08/22 0503 56 7 (125)    05/07/22 2038 56 7 (125)                Physical Exam  Vitals and nursing note reviewed  Constitutional:       Appearance: Normal appearance  HENT:      Head: Normocephalic  Nose: Nose normal       Mouth/Throat:      Mouth: Mucous membranes are moist    Eyes:      General: No scleral icterus  Conjunctiva/sclera: Conjunctivae normal    Cardiovascular:      Rate and Rhythm: Normal rate  Rhythm irregular  Heart sounds: Murmur heard  No gallop  Pulmonary:      Effort: Pulmonary effort is normal  No respiratory distress  Breath sounds: Normal breath sounds  No wheezing or rales  Abdominal:      General: Abdomen is flat  Bowel sounds are normal  There is no distension  Palpations: Abdomen is soft  Tenderness: There is no abdominal tenderness  There is no guarding  Musculoskeletal:      Cervical back: Normal range of motion and neck supple  Right lower leg: Edema present  Left lower leg: Edema present  Skin:     General: Skin is warm and dry  Neurological:      General: No focal deficit present  Mental Status: She is alert and oriented to person, place, and time  Psychiatric:         Mood and Affect: Mood normal          Behavior: Behavior normal            Laboratory Results:        CBC with diff:   Results from last 7 days   Lab Units 05/09/22  0257 05/08/22  1746 05/08/22  0503 05/07/22  1309   WBC Thousand/uL 3 80*  --  3 96* 4 15*   HEMOGLOBIN g/dL 8 5* 8 8* 8 4* 8 6*   HEMATOCRIT % 26 3* 27 7* 26 7* 27 5*   MCV fL 114*  --  116* 117*   PLATELETS Thousands/uL 147*  --  152 157   MCH pg 37 0*  --  36 4* 36 4*   MCHC g/dL 32 3  --  31 5 31 3*   RDW % 16 7*  --  16 8* 17 1*   MPV fL 10 9  --  11 2 10 3   NRBC AUTO /100 WBCs 1  --  1 1         CMP:  Results from last 7 days   Lab Units 05/09/22  0257 05/08/22  0503 05/07/22  1309   POTASSIUM mmol/L 3 9 4 1 5 0   CHLORIDE mmol/L 105 105 104   CO2 mmol/L 24 24 23   BUN mg/dL 54* 53* 55*   CREATININE mg/dL 2 18* 2 38* 2 54*   CALCIUM mg/dL 7 5* 7 8* 8 0*   EGFR ml/min/1 73sq m 18 16 15         BMP:  Results from last 7 days   Lab Units 05/09/22  0257 05/08/22  0503 05/07/22  1309   POTASSIUM mmol/L 3 9 4 1 5 0   CHLORIDE mmol/L 105 105 104   CO2 mmol/L 24 24 23   BUN mg/dL 54* 53* 55*   CREATININE mg/dL 2 18* 2 38* 2 54*   CALCIUM mg/dL 7 5* 7 8* 8 0*       BNP: No results for input(s): BNP in the last 72 hours  Magnesium:       Coags:   Results from last 7 days   Lab Units 05/07/22  1309 05/04/22  0000   PTT seconds 32  --    INR  1 87* 2 30*       TSH: No results found for: TSH    Hemoglobin A1C       Lipid Profile:       Cardiac testing:   No results found for this or any previous visit  No results found for this or any previous visit      No results found for this or any previous visit  No results found for this or any previous visit        Meds/Allergies   current meds:   Current Facility-Administered Medications   Medication Dose Route Frequency    acetaminophen (TYLENOL) tablet 650 mg  650 mg Oral Q6H PRN    aluminum-magnesium hydroxide-simethicone (MYLANTA) oral suspension 30 mL  30 mL Oral Q6H PRN    bumetanide (BUMEX) injection 3 mg  3 mg Intravenous BID    cyanocobalamin (VITAMIN B-12) tablet 250 mcg  250 mcg Oral Daily    ferrous sulfate tablet 325 mg  325 mg Oral Daily With Breakfast    fluticasone (FLONASE) 50 mcg/act nasal spray 1 spray  1 spray Nasal Daily    glycerin-hypromellose- (ARTIFICIAL TEARS) ophthalmic solution 2 drop  2 drop Ophthalmic Q3H PRN    [START ON 5/10/2022] metoprolol succinate (TOPROL-XL) 24 hr tablet 25 mg  25 mg Oral Daily    ondansetron (ZOFRAN) injection 4 mg  4 mg Intravenous Q6H PRN    pantoprazole (PROTONIX) EC tablet 40 mg  40 mg Oral Daily    potassium chloride (K-DUR,KLOR-CON) CR tablet 20 mEq  20 mEq Oral Daily    saccharomyces boulardii (FLORASTOR) capsule 250 mg  250 mg Oral BID    [START ON 5/10/2022] warfarin (COUMADIN) tablet 2 mg  2 mg Oral Daily (warfarin)    warfarin (COUMADIN) tablet 5 mg  5 mg Oral Once (warfarin)     Medications Prior to Admission   Medication    acetaminophen (TYLENOL) 325 mg tablet    clotrimazole (LOTRIMIN) 1 % cream    cyanocobalamin (VITAMIN B-12) 500 mcg tablet    Dextran 70-Hypromellose (ARTIFICIAL TEARS) 0 1-0 3 % SOLN    Emollient (CERAVE) CREA    ferrous sulfate 325 (65 Fe) mg tablet    fluticasone (FLONASE) 50 mcg/act nasal spray    guaiFENesin (ROBITUSSIN) 100 MG/5ML oral liquid    loperamide (IMODIUM) 2 mg capsule    metolazone (ZAROXOLYN) 2 5 mg tablet    metoprolol succinate (TOPROL-XL) 50 mg 24 hr tablet    Dfpkk-Mjoft-Wpffstd-Pramoxine (TRIPLE ANTIBIOTIC PLUS) 1 % OINT    pantoprazole (PROTONIX) 40 mg tablet    polyethylene glycol (MiraLax) 17 GM/SCOOP powder  potassium chloride (K-DUR,KLOR-CON) 20 mEq tablet    saccharomyces boulardii (Florastor) 250 mg capsule    Saline 0 65 % SOLN    Skin Protectants, Misc  (CALAZIME SKIN PROTECTANT) PSTE    Tetrahydroz-Dextran-PEG-Povid (EYE DROPS ADVANCED RELIEF OP)    Torsemide 40 MG TABS    warfarin (COUMADIN) 2 5 mg tablet    warfarin (COUMADIN) 5 mg tablet          Assessment:  Principal Problem:    Acute on chronic combined systolic and diastolic CHF (congestive heart failure) ([facility])  Active Problems:    Fall    Permanent atrial fibrillation (HCC)    Pericardial effusion    CAMPBELL (acute kidney injury) ([facility] [address] )      Plan:    Acute on Chronic Diastolic CHF: Echo shows severe dilated RV with severe TR  Renal function is improving with diuretics  Continue with IV bumex at 3 mg bid  S/P PPM implant   Perm AF: Continue rate control with  Metoprolol  Coumadin can be restarted           Counseling / Coordination of Care  Total floor / unit time spent today 25 minutes  Greater than 50% of total time was spent with the patient and / or family counseling and / or coordination of care  A description of the counseling / coordination of care

## 2022-05-09 NOTE — PHYSICAL THERAPY NOTE
PT eval   05/09/22 0950   PT Last Visit   PT Visit Date 05/09/22   Note Type   Note type Evaluation   Pain Assessment   Pain Assessment Tool Urena-Baker FACES   Pain Score No Pain   Restrictions/Precautions   Other Precautions Fall Risk; Chair Alarm   Home Living   Type of Home Assisted living  (uses rw to amb to DR, recent fall)   Home Layout One level   [address] [facility]   Prior Function   Level of [place] Independent with ADLs and functional mobility   Lives With Facility staff   Receives Help From Personal care attendant   ADL Assistance Independent   IADLs Needs assistance   Falls in the last 6 months 1 to 4   Vocational Retired   [employer]   Additional Pertinent History adm after fall with acute chf, lacreation lf L leg and head trauma  PMH+ permanent  aifb, apcemeker htn, CAD, CAMPBELL, CAD   Family/Caregiver Present No   Cognition   Overall Cognitive Status WFL   Arousal/Participation Alert   Attention Within functional limits   Orientation Level Oriented X4   Memory Within functional limits   Following Commands Follows all commands and directions without difficulty   Subjective   Subjective agrees to mobilize   RUE Assessment   RUE Assessment WFL   LUE Assessment   LUE Assessment WFL   RLE Assessment   RLE Assessment WNL   LLE Assessment   LLE Assessment WNL   Vision-Basic Assessment   Current Vision Wears glasses all the time   Coordination   Movements are Fluid and Coordinated 1   Sensation WFL   Finger to Nose & Finger to Finger  Intact   Heel to Stubbs Intact   Proprioception   RLE Proprioception Grossly intact   LLE Proprioception Grossly Intact   Bed Mobility   Supine to Sit 5  Supervision   Transfers   Sit to Stand 4  Minimal assistance   Additional items Assist x 1  (rw)   Stand to Sit 4  Minimal assistance   Additional items Assist x 1;Bedrails;Armrests; Verbal cues   Stand pivot 4  Minimal assistance   Additional items Assist x 1; Armrests; Increased time required;Verbal cues  (rw) Ambulation/Elevation   Gait pattern Improper Weight shift; Forward Flexion;Decreased foot clearance;Shuffling; Short stride; Step to   Gait Assistance 4  Minimal assist   Additional items Assist x 1;Verbal cues; Tactile cues   Assistive Device Rolling walker   Distance 5'   Balance   Static Sitting Good   Dynamic Sitting Fair +   Static Standing Fair +   Dynamic Standing Fair   Ambulatory Fair   Activity Tolerance   Activity Tolerance Patient tolerated treatment well   Medical Staff Made Aware OT Bhavesh Andrews   Nurse Made Aware RN Charla   Assessment   Prognosis Good   Problem List Decreased safety awareness;Decreased endurance;Decreased mobility   Assessment Pt presents s/p fall with CHF  Currently requires Ax1 for safe transfers and limited Amb  Can benefit from skilled  PT serices to improve gait and activity tolerance  REcommend supervision with activity upon return to [facility] and home PT services  Will follow see goals   Barriers to Discharge   (medical clearance)   Goals   Patient Goals go home   STG Expiration Date 05/19/22   Short Term Goal #1 1) safe ind transfers 2) safe ind amb with rw 100' level no sob 3) improve balance to good  Plan   Treatment/Interventions ADL retraining;Functional transfer training;LE strengthening/ROM; Therapeutic exercise; Endurance training;Gait training;Spoke to nursing;Spoke to case management;OT   PT Frequency 3-5x/wk   Recommendation   PT Discharge Recommendation Home with home health rehabilitation   Equipment Recommended [place] walker   [address] [place] [address] Mobility Inpatient   Turning in Bed Without Bedrails 3   Lying on Back to Sitting on Edge of Flat Bed 3   Moving Bed to Chair 3   Standing Up From Chair 3   Walk in Room 2   Climb 3-5 Stairs 2   Basic Mobility Inpatient Raw Score 16   Basic Mobility Standardized Score 38 32   Highest Level Of Mobility   JH-HL Goal 5: Stand one or more mins   -HL Highest Level of Mobility 4: Move to chair/commode KAROLINA-HLJONY Goal Achieved No   Modified Bibb Scale   Modified Bibb Scale 4   Adelina Garcia, PT

## 2022-05-09 NOTE — PLAN OF CARE
Problem: OCCUPATIONAL THERAPY ADULT  Goal: Performs self-care activities at highest level of function for planned discharge setting  See evaluation for individualized goals  Description: Treatment Interventions: ADL retraining,Functional transfer training,Compensatory technique education          See flowsheet documentation for full assessment, interventions and recommendations  Note: Limitation: Decreased ADL status  Prognosis: Good  Assessment: Pt is a 80 y o  female seen for OT evaluation at [facility], admitted 5/7/2022 w/ Acute on chronic combined systolic and diastolic CHF (congestive heart failure) ([facility] [address] ), BLE edema, recent fall w/ left knee hematoma  OT completed extensive review of pt's medical and social history  Comorbidities affecting pt's functional performance at time of assessment include: PPM, HTN, CAD, CAMPBELL, and permanent a-fib  Personal factors affecting pt at time of IE include:difficulty performing ADLS and decreased functional mobility  Prior to admission, pt was living at [address] assisted living facility  Pt independent for ADLs except for requiring supervision for bathing  Pt also relied on use of RW and reports being able to walk to dining corona independently  Upon evaluation: Pt requires supervision for bed mobility, Min A x1 for functional mobility/transfers, supervision for UB ADLs and Mod A for LB ADLS 2* the following deficits impacting occupational performance: weakness, decreased balance and increased BLE edema  Pt to benefit from continued skilled OT tx while in the hospital to address deficits as defined above and maximize level of functional independence w ADL's and functional mobility  Occupational Performance areas to address include: bathing/shower, toilet hygiene, dressing and functional mobility  Based on findings, pt is of high complexity   The patient's raw score on the AM-PAC Daily Activity inpatient short form is 15, standardized score is 37 26, less than 39 4  Patients at this level are likely to benefit from DC to post-acute rehabilitation services  However please refer to therapist recommendation for discharge planning given other factors that may influence destination  At this time, OT recommendations at time of discharge are home OT  Pt would also benefit from 1 person assist for all transfers at this time as well as increased assist for ADLs  If facility if able to provide this, pt may return safely to current assisted living facility  Spoke with BELKIS Patel re: plan        OT Discharge Recommendation: Home with home health rehabilitation (Requires increased assist for ADLs and all tranfers)

## 2022-05-09 NOTE — ASSESSMENT & PLAN NOTE
Wt Readings from Last 3 Encounters:   05/09/22 56 9 kg (125 lb 7 1 oz)   03/25/22 58 5 kg (129 lb)   03/10/22 53 6 kg (118 lb 2 7 oz)     · Patient has been having medications adjusted outpatient due to ongoing fluid retention in lower extremeties  · Weight as of 3/10/22: 118lbs   Patient reports her weight had gone up to 130lbs  · On torsemide 40mg BID with 2 5mg PRN metolazone  · 2mg Bumex BID  · Daily weights  · Intake and output (suspect this is inaccurate)  · -149cc since admission   · -4lbs  · Cardiology Consulted

## 2022-05-09 NOTE — PLAN OF CARE
Problem: PHYSICAL THERAPY ADULT  Goal: Performs mobility at highest level of function for planned discharge setting  See evaluation for individualized goals  Description: Treatment/Interventions: ADL retraining,Functional transfer training,LE strengthening/ROM,Therapeutic exercise,Endurance training,Gait training,Spoke to nursing,Spoke to case management,OT  Equipment Recommended: Adrianna Avila       See flowsheet documentation for full assessment, interventions and recommendations  Outcome: Progressing  Note: Prognosis: Good  Problem List: Decreased safety awareness,Decreased endurance,Decreased mobility  Assessment: Pt presents s/p fall with CHF  Currently requires Ax1 for safe transfers and limited Amb  Can benefit from skilled  PT serices to improve gait and activity tolerance  REcommend supervision with activity upon return to [facility] and home PT services  Will follow see goals  Barriers to Discharge:  (medical clearance)        PT Discharge Recommendation: Home with home health rehabilitation          See flowsheet documentation for full assessment

## 2022-05-09 NOTE — ASSESSMENT & PLAN NOTE
· Mechanical fall due to loose shoes  · Typically ambulates with a walker  · PT/OT ordered   · CT for head negative for bleed despite impact to left side of forehead  · Evaluated and cleared by trauma team for admission  · Recommending holding Coumadin, administered vitamin K IV given large hematoma of left knee  · Hb remains stable (mid-8)

## 2022-05-10 LAB
ANION GAP SERPL CALCULATED.3IONS-SCNC: 10 MMOL/L (ref 4–13)
BASOPHILS # BLD AUTO: 0.01 THOUSANDS/ΜL (ref 0–0.1)
BASOPHILS NFR BLD AUTO: 0 % (ref 0–1)
BUN SERPL-MCNC: 50 MG/DL (ref 5–25)
CALCIUM SERPL-MCNC: 8 MG/DL (ref 8.3–10.1)
CHLORIDE SERPL-SCNC: 107 MMOL/L (ref 100–108)
CO2 SERPL-SCNC: 24 MMOL/L (ref 21–32)
CREAT SERPL-MCNC: 2.04 MG/DL (ref 0.6–1.3)
EOSINOPHIL # BLD AUTO: 0.04 THOUSAND/ΜL (ref 0–0.61)
EOSINOPHIL NFR BLD AUTO: 1 % (ref 0–6)
ERYTHROCYTE [DISTWIDTH] IN BLOOD BY AUTOMATED COUNT: 16.8 % (ref 11.6–15.1)
GFR SERPL CREATININE-BSD FRML MDRD: 19 ML/MIN/1.73SQ M
GLUCOSE SERPL-MCNC: 94 MG/DL (ref 65–140)
HCT VFR BLD AUTO: 27.7 % (ref 34.8–46.1)
HGB BLD-MCNC: 9.1 G/DL (ref 11.5–15.4)
IMM GRANULOCYTES # BLD AUTO: 0.02 THOUSAND/UL (ref 0–0.2)
IMM GRANULOCYTES NFR BLD AUTO: 1 % (ref 0–2)
INR PPP: 1.16 (ref 0.84–1.19)
LYMPHOCYTES # BLD AUTO: 0.94 THOUSANDS/ΜL (ref 0.6–4.47)
LYMPHOCYTES NFR BLD AUTO: 27 % (ref 14–44)
MCH RBC QN AUTO: 37.4 PG (ref 26.8–34.3)
MCHC RBC AUTO-ENTMCNC: 32.9 G/DL (ref 31.4–37.4)
MCV RBC AUTO: 114 FL (ref 82–98)
MONOCYTES # BLD AUTO: 0.33 THOUSAND/ΜL (ref 0.17–1.22)
MONOCYTES NFR BLD AUTO: 10 % (ref 4–12)
NEUTROPHILS # BLD AUTO: 2.11 THOUSANDS/ΜL (ref 1.85–7.62)
NEUTS SEG NFR BLD AUTO: 61 % (ref 43–75)
NRBC BLD AUTO-RTO: 0 /100 WBCS
PLATELET # BLD AUTO: 155 THOUSANDS/UL (ref 149–390)
PMV BLD AUTO: 11.1 FL (ref 8.9–12.7)
POTASSIUM SERPL-SCNC: 3.8 MMOL/L (ref 3.5–5.3)
PROTHROMBIN TIME: 14.6 SECONDS (ref 11.6–14.5)
RBC # BLD AUTO: 2.43 MILLION/UL (ref 3.81–5.12)
SODIUM SERPL-SCNC: 141 MMOL/L (ref 136–145)
WBC # BLD AUTO: 3.45 THOUSAND/UL (ref 4.31–10.16)

## 2022-05-10 PROCEDURE — 85610 PROTHROMBIN TIME: CPT | Performed by: PHYSICIAN ASSISTANT

## 2022-05-10 PROCEDURE — 80048 BASIC METABOLIC PNL TOTAL CA: CPT | Performed by: INTERNAL MEDICINE

## 2022-05-10 PROCEDURE — 99232 SBSQ HOSP IP/OBS MODERATE 35: CPT | Performed by: INTERNAL MEDICINE

## 2022-05-10 PROCEDURE — 85025 COMPLETE CBC W/AUTO DIFF WBC: CPT | Performed by: INTERNAL MEDICINE

## 2022-05-10 PROCEDURE — 97530 THERAPEUTIC ACTIVITIES: CPT

## 2022-05-10 PROCEDURE — 99232 SBSQ HOSP IP/OBS MODERATE 35: CPT | Performed by: PHYSICIAN ASSISTANT

## 2022-05-10 PROCEDURE — 97535 SELF CARE MNGMENT TRAINING: CPT

## 2022-05-10 RX ORDER — POLYETHYLENE GLYCOL 3350 17 G/17G
17 POWDER, FOR SOLUTION ORAL DAILY PRN
Status: DISCONTINUED | OUTPATIENT
Start: 2022-05-10 | End: 2022-05-16 | Stop reason: HOSPADM

## 2022-05-10 RX ADMIN — POTASSIUM CHLORIDE 20 MEQ: 20 TABLET, EXTENDED RELEASE ORAL at 07:58

## 2022-05-10 RX ADMIN — Medication 250 MG: at 17:17

## 2022-05-10 RX ADMIN — METOPROLOL SUCCINATE 25 MG: 25 TABLET, FILM COATED, EXTENDED RELEASE ORAL at 07:58

## 2022-05-10 RX ADMIN — FLUTICASONE PROPIONATE 1 SPRAY: 50 SPRAY, METERED NASAL at 08:00

## 2022-05-10 RX ADMIN — POLYETHYLENE GLYCOL 3350 17 G: 17 POWDER, FOR SOLUTION ORAL at 17:51

## 2022-05-10 RX ADMIN — WARFARIN SODIUM 2 MG: 2 TABLET ORAL at 17:17

## 2022-05-10 RX ADMIN — CYANOCOBALAMIN TAB 500 MCG 250 MCG: 500 TAB at 07:58

## 2022-05-10 RX ADMIN — BUMETANIDE 3 MG: 0.25 INJECTION, SOLUTION INTRAMUSCULAR; INTRAVENOUS at 07:58

## 2022-05-10 RX ADMIN — FERROUS SULFATE TAB 325 MG (65 MG ELEMENTAL FE) 325 MG: 325 (65 FE) TAB at 07:58

## 2022-05-10 RX ADMIN — PANTOPRAZOLE SODIUM 40 MG: 40 TABLET, DELAYED RELEASE ORAL at 07:58

## 2022-05-10 RX ADMIN — Medication 250 MG: at 07:58

## 2022-05-10 RX ADMIN — BUMETANIDE 3 MG: 0.25 INJECTION, SOLUTION INTRAMUSCULAR; INTRAVENOUS at 17:17

## 2022-05-10 NOTE — ASSESSMENT & PLAN NOTE
· Bi-V dual PPM Last interrogation 3/2022- functioning normally  · Managed on coumadin 2 5mg daily, 5mg Mondays  · Coumadin resumed 5/9  · Trend INR daily - subtherapeutic in setting of recent vitamin K administration and holding of Coumadin

## 2022-05-10 NOTE — CASE MANAGEMENT
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 03/10/2022    Chronic diastolic CHF (congestive heart failure) ([place] [address] ) 2021    Permanent atrial fibrillation ([place] [address] ) 2014    Pericardial effusion 2014    Coronary artery disease 2013    Hyperlipidemia 08/15/2012    Hypertension 2012      LOS (days): 3  Geometric Mean LOS (GMLOS) (days): 3 80  Days to GMLOS:0 8     OBJECTIVE:  Risk of Unplanned Readmission Score: 18         Current admission status: Inpatient   Preferred Pharmacy:   [address] [place] [address]  Phone: [phone] Fax: [phone]    Primary Care Provider: No primary care provider on file  Primary Insurance: MEDICARE  Secondary Insurance:  FOR LIFE    DISCHARGE DETAILS:      Additional Comments: CM called [facility] to check the status of pt's referral  CM spoke to Elian who states referral was not received  CM faxed clinical for [facility] to review--fax# [phone]  Awaiting call back regarding acceptance  CM spoke to Beatriz HART, who states pt is requiring assist x1 still with all needs  CM called Jaydon Martinez at [facility] [phone] to discuss same  Jaydon Martinez requested PT/OT notes be faxed to her to review to see if they can still accomidate in the [facility]  CM to fax PT/OT notes when completed to fax# [phone]  Will follow up with Jaydon Martinez at [facility]

## 2022-05-10 NOTE — ASSESSMENT & PLAN NOTE
Wt Readings from Last 3 Encounters:   05/10/22 54 7 kg (120 lb 9 6 oz)   03/25/22 58 5 kg (129 lb)   03/10/22 53 6 kg (118 lb 2 7 oz)     · Patient has been having medications adjusted outpatient due to ongoing fluid retention in lower extremeties  · Weight as of 3/10/22: 118lbs   Patient reports her weight had gone up to 130lbs  · Standing weight 120 lb this morning  · Encourage standing weights as opposed to bed scale  · On torsemide 40mg BID with 2 5mg PRN metolazone as outpatient  · Currently receiving 3 mg IV Bumex BID  · Daily weights  · Cardiology consulted - appreciate recommendations

## 2022-05-10 NOTE — ASSESSMENT & PLAN NOTE
· Creatinine was 1 95 as of 3/10/2022, prior to this in 2014 baseline was approximately 1 3-1 5  · Suspect due to volume overload versus advancement of underlying kidney disease  · 2 54 at time of admission  · Creatinine continues to improve in setting of IV diuresis - 2 04  · Trend BMP daily

## 2022-05-10 NOTE — PLAN OF CARE
Problem: OCCUPATIONAL THERAPY ADULT  Goal: Performs self-care activities at highest level of function for planned discharge setting  See evaluation for individualized goals  Description: Treatment Interventions: ADL retraining,Functional transfer training,Compensatory technique education,Energy conservation          See flowsheet documentation for full assessment, interventions and recommendations  Outcome: Progressing  Note: Limitation: Decreased ADL status  Prognosis: Good  Assessment: Pt seen for OT tx session with focus on functional mobility, ADL status, and transfer safety  Patient agreeable to OT treatment session  Pt received supine in bed  Pt required supervision for bed mobility and Min A x1 for all transfers  Pt required supervision for UB ADLs and Min A x1 for LB ADLs  Pt endorsing fatigue t/o session, benefited from seated position when completing all ADL tasks  Pt reported she is not at functional baseline, does not feel confident to be discharged from hospital at this time  Patient continues to be functioning below baseline level, occupational performance remains limited secondary to factors listed above, and pt at increased risk for falls and injury  The patient's raw score on the AM-PAC Daily Activity inpatient short form is 17, standardized score is 37 26, less than 39 4  Patients at this level are likely to benefit from DC to post-acute rehabilitation services  Please refer to the recommendation of the Occupational Therapist for safe DC planning  From OT standpoint, recommendation at time of d/c would be Home OT + Home with increased social support (requires Min A x1 for all transfers and supervision to  [address] for ADLs) vs Short Term Rehab  Patient to benefit from continued Occupational Therapy treatment while in the hospital to address deficits as defined above and maximize level of functional independence with ADLs and functional mobility   Pt left supine in bed with call bell in reach, tray table in reach, needs met        OT Discharge Recommendation: Home with home health rehabilitation (vs STR- see assessment for details)

## 2022-05-10 NOTE — OCCUPATIONAL THERAPY NOTE
Occupational Therapy Tx Note     Patient Name: Judy Rm  PDRFM'Y Date: 5/10/2022  Problem List  Principal Problem:    Acute on chronic combined systolic and diastolic CHF (congestive heart failure) ([facility] )  Active Problems:    Fall    Permanent atrial fibrillation ([facility] )    Pericardial effusion    CAMPBELL (acute kidney injury) ([facility] )            05/10/22 1437   OT Last Visit   OT Visit Date 05/10/22   Note Type   Note Type Treatment   Restrictions/Precautions   Other Precautions Fall Risk; Chair Alarm   Pain Assessment   Pain Assessment Tool 0-10   Pain Score No Pain   ADL   Grooming Assistance 5  Supervision/Setup   Grooming Deficit Brushing hair;Teeth care;Wash/dry face   Grooming Comments Seated in bathroom at sink 2/2 pt c/o fatigue   Toileting Assistance  4  Minimal Assistance   Toileting Deficit Supervison/safety;Grab bar use;Clothing management down;Perineal hygiene;Verbal cueing   Toileting Comments Required Min A for perineal hygiene, however able to complete clothing mgmt with supervision   Bed Mobility   Supine to Sit 5  Supervision   Additional items Bedrails;HOB elevated; Increased time required;Verbal cues   Sit to Supine 5  Supervision   Additional items Verbal cues; Bedrails   Transfers   Sit to Stand 4  Minimal assistance   Additional items Assist x 1;Verbal cues   Stand to Sit 4  Minimal assistance   Additional items Assist x 1;Verbal cues   Toilet transfer 4  Minimal assistance   Additional items Assist x 1;Verbal cues;Armrests  (lower height toilet)   Cognition   Overall Cognitive Status WFL   Arousal/Participation Alert   Attention Within functional limits   Orientation Level Oriented X4   Memory Within functional limits   Following Commands Follows all commands and directions without difficulty   Activity Tolerance   Activity Tolerance Patient limited by fatigue  (Pt c/o feeling tired, SpO2 > 93%)   Medical Staff Made Aware CHRISTOFER [place]   Assessment   Assessment Pt seen for OT tx session with focus on functional mobility, ADL status, and transfer safety  Patient agreeable to OT treatment session  Pt received supine in bed  Pt required supervision for bed mobility and Min A x1 for all transfers  Pt required supervision for UB ADLs and Min A x1 for LB ADLs  Pt endorsing fatigue t/o session, benefited from seated position when completing all ADL tasks  Pt reported she is not at functional baseline, does not feel confident to be discharged from hospital at this time  Patient continues to be functioning below baseline level, occupational performance remains limited secondary to factors listed above, and pt at increased risk for falls and injury  The patient's raw score on the AM-PAC Daily Activity inpatient short form is 17, standardized score is 37 26, less than 39 4  Patients at this level are likely to benefit from DC to post-acute rehabilitation services  Please refer to the recommendation of the Occupational Therapist for safe DC planning  From OT standpoint, recommendation at time of d/c would be Home OT + Home with increased social support (requires Min A x1 for all transfers and supervision to [address] for ADLs) vs Short Term Rehab  Patient to benefit from continued Occupational Therapy treatment while in the hospital to address deficits as defined above and maximize level of functional independence with ADLs and functional mobility  Pt left supine in bed with call bell in reach, tray table in reach, needs met  Plan   Treatment Interventions ADL retraining;Functional transfer training; Compensatory technique education; Energy conservation   Goal Expiration Date 05/19/22   OT Treatment Day 1   OT Frequency 2-3x/wk   Recommendation   OT Discharge Recommendation Home with home health rehabilitation  (vs STR- see assessment for details)   AM-PAC Daily Activity Inpatient   Lower Body Dressing 2   Bathing 2   Toileting 2   Upper Body Dressing 3   Grooming 4   Eating 4   Daily Activity Raw Score 17   Daily Activity Standardized Score (Calc for Raw Score >=11) 37 26   AM-PAC Applied Cognition Inpatient   Following a Speech/Presentation 4   Understanding Ordinary Conversation 4   Taking Medications 3   Remembering Where Things Are Placed or Put Away 4   Remembering List of 4-5 Errands 4   Taking Care of Complicated Tasks 3   Applied Cognition Raw Score 22   Applied Cognition Standardized Score 47 83         Stacie Bolden, OTR/L

## 2022-05-10 NOTE — ASSESSMENT & PLAN NOTE
· Mechanical fall due to loose shoes  · Typically ambulates with a walker  · PT/OT ordered - recommend home with VNA services  · CT head negative for acute intracranial abnormalities  · Evaluated and cleared by trauma team for admission at [address]  · Recommended initially holding Coumadin, administered vitamin K IV given large hematoma  · Hb remains stable from admission  · Coumadin resumed 5/9

## 2022-05-10 NOTE — PROGRESS NOTES
[place]     Progress Note Iqra Robles 2/28/1923, 80 y o  female MRN: [id number]  Unit/Bed#: -01 Encounter: [id number]  Primary Care Provider: No primary care provider on file  Date and time admitted to hospital: 5/7/2022 12:51 PM    * Acute on chronic combined systolic and diastolic CHF (congestive heart failure) ([facility])  Assessment & Plan  Wt Readings from Last 3 Encounters:   05/10/22 54 7 kg (120 lb 9 6 oz)   03/25/22 58 5 kg (129 lb)   03/10/22 53 6 kg (118 lb 2 7 oz)     · Patient has been having medications adjusted outpatient due to ongoing fluid retention in lower extremeties  · Weight as of 3/10/22: 118lbs   Patient reports her weight had gone up to 130lbs  · Standing weight 120 lb this morning  · Encourage standing weights as opposed to bed scale  · On torsemide 40mg BID with 2 5mg PRN metolazone as outpatient  · Currently receiving 3 mg IV Bumex BID  · Daily weights  · Cardiology consulted - appreciate recommendations    CAMPBELL (acute kidney injury) ([facility] )  Assessment & Plan  · Creatinine was 1 95 as of 3/10/2022, prior to this in 2014 baseline was approximately 1 3-1 5  · Suspect due to volume overload versus advancement of underlying kidney disease  · 2 54 at time of admission  · Creatinine continues to improve in setting of IV diuresis - 2 04  · Trend BMP daily    Pericardial effusion  Assessment & Plan  · Noted on CXR  · Previously was drained approximately 5yrs ago- suspected due to microperforation after PPM placement at that time  · Echocardiogram ordered    Permanent atrial fibrillation ([facility] )  Assessment & Plan  · Bi-V dual PPM Last interrogation 3/2022- functioning normally  · Managed on coumadin 2 5mg daily, 5mg Mondays  · Coumadin resumed 5/9  · Trend INR daily - subtherapeutic in setting of recent vitamin K administration and holding of Coumadin    Fall  Assessment & Plan  · Mechanical fall due to loose shoes  · Typically ambulates with a walker  · PT/OT ordered - recommend home with VNA services  · CT head negative for acute intracranial abnormalities  · Evaluated and cleared by trauma team for admission at [address]  · Recommended initially holding Coumadin, administered vitamin K IV given large hematoma  · Hb remains stable from admission  · Coumadin resumed     VTE Pharmacologic Prophylaxis: VTE Score: 5 High Risk (Score >/= 5) - Pharmacological DVT Prophylaxis Ordered: warfarin (Coumadin)  Sequential Compression Devices Ordered  Patient Centered Rounds: I performed bedside rounds with nursing staff today  Discussions with Specialists or Other Care Team Provider: BELKIS, will discuss with cardiology    Education and Discussions with Family / Patient: Patient declined call to   Offered to call  Time Spent for Care: 30 minutes  More than 50% of total time spent on counseling and coordination of care as described above  Current Length of Stay: 3 day(s)  Current Patient Status: Inpatient   Certification Statement: The patient will continue to require additional inpatient hospital stay due to IV diuresis per Cardiology  Discharge Plan: Anticipate discharge in 24-48 hrs to home with home services  Code Status: Level 1 - Full Code    Subjective:   Patient denies any complaints  Feels that her breathing has improved  Notes her leg swelling is greatly improved from admission  Denies chest pain/palpitations, nausea vomiting, abdominal pain  Objective:     Vitals:   Temp (24hrs), Av 2 °F (36 8 °C), Min:98 °F (36 7 °C), Max:98 4 °F (36 9 °C)    Temp:  [98 °F (36 7 °C)-98 4 °F (36 9 °C)] 98 2 °F (36 8 °C)  HR:  [61-81] 61  Resp:  [16-18] 18  BP: (111-132)/(57-85) 112/59  SpO2:  [96 %-98 %] 97 %  Body mass index is 21 36 kg/m²  Input and Output Summary (last 24 hours):      Intake/Output Summary (Last 24 hours) at 5/10/2022 1055  Last data filed at 5/10/2022 0630  Gross per 24 hour   Intake 540 ml   Output 1931 ml   Net -1391 ml       Physical Exam:   Physical Exam  Vitals and nursing note reviewed  Constitutional:       Appearance: Normal appearance  Comments: Appears comfortable, no acute distress   HENT:      Head: Normocephalic  Eyes:      General: No scleral icterus  Extraocular Movements: Extraocular movements intact  Conjunctiva/sclera: Conjunctivae normal    Cardiovascular:      Rate and Rhythm: Normal rate  Rhythm irregularly irregular  Heart sounds: Murmur heard  Pulmonary:      Effort: Pulmonary effort is normal       Breath sounds: Normal breath sounds  No wheezing, rhonchi or rales  Abdominal:      General: Bowel sounds are normal       Palpations: Abdomen is soft  Tenderness: There is no abdominal tenderness  There is no guarding or rebound  Musculoskeletal:         General: No swelling, tenderness or deformity  Cervical back: Normal range of motion  Comments: Able to move upper/lower extremities bilaterally  Trace to 1+ lower extremity edema bilaterally   Skin:     General: Skin is warm and dry  Neurological:      Mental Status: She is alert and oriented to person, place, and time     Psychiatric:         Mood and Affect: Mood normal          Speech: Speech normal          Behavior: Behavior normal           Additional Data:     Labs:  Results from last 7 days   Lab Units 05/10/22  0641   WBC Thousand/uL 3 45*   HEMOGLOBIN g/dL 9 1*   HEMATOCRIT % 27 7*   PLATELETS Thousands/uL 155   NEUTROS PCT % 61   LYMPHS PCT % 27   MONOS PCT % 10   EOS PCT % 1     Results from last 7 days   Lab Units 05/10/22  0641   SODIUM mmol/L 141   POTASSIUM mmol/L 3 8   CHLORIDE mmol/L 107   CO2 mmol/L 24   BUN mg/dL 50*   CREATININE mg/dL 2 04*   ANION GAP mmol/L 10   CALCIUM mg/dL 8 0*   GLUCOSE RANDOM mg/dL 94     Results from last 7 days   Lab Units 05/10/22  0641   INR  1 16                   Lines/Drains:  Invasive Devices  Report    Peripheral Intravenous Line            Peripheral IV 05/09/22 Dorsal (posterior); Left Hand <1 day          Drain            External Urinary Catheter <1 day                      Imaging: Reviewed radiology reports from this admission including: CT head and CT lower extremity    Recent Cultures (last 7 days):         Last 24 Hours Medication List:   Current Facility-Administered Medications   Medication Dose Route Frequency Provider Last Rate    acetaminophen  650 mg Oral Q6H PRN Kaelyn Irizarry V, EDER      aluminum-magnesium hydroxide-simethicone  30 mL Oral Q6H PRN Kaelyn DALEY PA-C      bumetanide  3 mg Intravenous BID Morrie Counter, MD      vitamin B-12  250 mcg Oral Daily Kaelyn Irizarry V, EDER      ferrous sulfate  325 mg Oral Daily With Breakfast Kaelyn DALEY PA-C      fluticasone  1 spray Nasal Daily Yamileth Johnson PA-C      glycerin-hypromellose-  2 drop Ophthalmic Q3H PRN Ritika Meshaluly DALEY PA-C      metoprolol succinate  25 mg Oral Daily Morrie Counter, MD      ondansetron  4 mg Intravenous Q6H PRN Kaelyn DALEY PA-C      pantoprazole  40 mg Oral Daily Rachellea Meshaluly DALEY PA-C      potassium chloride  20 mEq Oral Daily Kaelyn DALEY PA-C      saccharomyces boulardii  250 mg Oral BID Kaelyn DALEY PA-C      warfarin  2 mg Oral Daily (warfarin) Yamileth Johnson PA-C          Today, Patient Was Seen By: Perico Baez PA-C    **Please Note: This note may have been constructed using a voice recognition system  **

## 2022-05-10 NOTE — CASE MANAGEMENT
Case Management Progress Note    Patient name Marguerite Lancaster  Location [address] 227/-42 MRN [id number]  : 1923 Date 5/10/2022       LOS (days): 3  Geometric Mean LOS (GMLOS) (days): 3 80  Days to GMLOS:0 7        OBJECTIVE:        Current admission status: Inpatient  Preferred Pharmacy:   [address] [address]  Phone: [phone] Fax: [phone]    Primary Care Provider: No primary care provider on file  Primary Insurance: MEDICARE  Secondary Insurance:  FOR LIFE    PROGRESS NOTE:    [facility]  CM faxed PT/OT notes to [facility] to review; awaiting call back

## 2022-05-10 NOTE — PROGRESS NOTES
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:  Currently on IV Bumex 3 mg BID  · Echocardiogram 5/9:  EF 55% with no WMA, moderately reduced RV function, severe RA dilatation, moderate LA dilatation, moderate AS, mild-moderate MR, severe TR  · Home diuretic regimen: Torsemide 40 mg BID with as needed metolazone 2 5 mg  · Dry weight:  118 lb  · Weight on admission:  125 lb per bed scale  · Weight today:  120 lb per standing scale  · I&Os:  Output 24 hours -1 1 L  Net output: -1 5 L  2  Permanent atrial fibrillation:  Maintained on Toprol-XL 25 mg daily  Anticoagulated on Coumadin with INR 1 16 today  3  Bi V PPM: BSC DC PPM (not MRI 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%  no high rate episodes, chronic AFib, normal device function on interrogation 03/23/2022  4  CKD:  Unclear baseline was 1 95 03/2022  Creatinine 2 54 POA improved to 2 04 today with diuresis  Plan/Recommendations:  · Continue IV Bumex at current dosing of 3 mg BID with possible transition to oral diuretics tomorrow  · Monitor I&Os, renal function, electrolytes and standing weight  · Maintain potassium >4 and magnesium >2  · Continue beta-blocker  · Continue Coumadin for anticoagulation with goal INR 2-3    _____________________________________________________________________________________    Subjective    Patient seen and examined  No acute events overnight  She feels her breathing is improved but still not to baseline  She denies any chest pain or palpitations  Review of Systems   Constitutional: Negative  Negative for chills  Cardiovascular: Negative for chest pain, dyspnea on exertion, leg swelling, near-syncope, orthopnea, palpitations, paroxysmal nocturnal dyspnea and syncope  Respiratory: Positive for shortness of breath  Negative for cough and wheezing  Endocrine: Negative  Hematologic/Lymphatic: Negative  Skin: Negative  Musculoskeletal: Negative  Gastrointestinal: Negative  Negative for diarrhea, nausea and vomiting  Neurological: Negative for dizziness, light-headedness and weakness  Psychiatric/Behavioral: Negative  Negative for altered mental status  All other systems reviewed and are negative  Objective:   Vitals: Blood pressure 112/59, pulse 61, temperature 98 2 °F (36 8 °C), temperature source Oral, resp  rate 18, height 5' 3" (1 6 m), weight 54 7 kg (120 lb 9 6 oz), SpO2 97 %  ,     Wt Readings from Last 3 Encounters:   05/10/22 54 7 kg (120 lb 9 6 oz)   03/25/22 58 5 kg (129 lb)   03/10/22 53 6 kg (118 lb 2 7 oz)        Lab Results   Component Value Date    CREATININE 2 04 (H) 05/10/2022    CREATININE 2 18 (H) 05/09/2022    CREATININE 2 38 (H) 05/08/2022         Body mass index is 21 36 kg/m²  ,     Systolic (15NKL), YKP:045 , Min:111 , GCX:378     Diastolic (24KTY), FCP:36, Min:56, Max:85          Intake/Output Summary (Last 24 hours) at 5/10/2022 0920  Last data filed at 5/10/2022 0630  Gross per 24 hour   Intake 540 ml   Output 1931 ml   Net -1391 ml     Weight (last 2 days)     Date/Time Weight    05/10/22 0600 54 7 (120 6)    05/09/22 1145 56 7 (125)    05/09/22 0456 56 9 (125 44)    05/08/22 0503 56 7 (125)            Physical Exam  Vitals and nursing note reviewed  Constitutional:       General: She is not in acute distress  Appearance: She is well-developed  Comments: Frail elderly female on RA in NAD   HENT:      Head: Normocephalic and atraumatic  Neck:      Vascular: No JVD  Cardiovascular:      Rate and Rhythm: Normal rate  Rhythm irregular  Heart sounds: Normal heart sounds  No murmur heard  No friction rub  Pulmonary:      Effort: Pulmonary effort is normal  No respiratory distress  Breath sounds: No wheezing or rales        Comments: Diminished breath sounds bilaterally  Abdominal:      General: Bowel sounds are normal  There is no distension  Palpations: Abdomen is soft  Tenderness: There is no abdominal tenderness  Musculoskeletal:         General: No tenderness  Normal range of motion  Cervical back: Normal range of motion and neck supple  Right lower leg: Edema present  Left lower leg: Edema present  Skin:     General: Skin is warm and dry  Findings: No erythema  Neurological:      Mental Status: She is alert and oriented to person, place, and time  Psychiatric:         Mood and Affect: Mood normal          Behavior: Behavior normal          Thought Content:  Thought content normal          Judgment: Judgment normal          LABORATORY RESULTS      CBC with diff:   Results from last 7 days   Lab Units 05/10/22  0641 05/09/22  1639 05/09/22  0257 05/08/22  1746 05/08/22  0503 05/07/22  1309   WBC Thousand/uL 3 45*  --  3 80*  --  3 96* 4 15*   HEMOGLOBIN g/dL 9 1* 8 8* 8 5* 8 8* 8 4* 8 6*   HEMATOCRIT % 27 7* 28 1* 26 3* 27 7* 26 7* 27 5*   MCV fL 114*  --  114*  --  116* 117*   PLATELETS Thousands/uL 155  --  147*  --  152 157   MCH pg 37 4*  --  37 0*  --  36 4* 36 4*   MCHC g/dL 32 9  --  32 3  --  31 5 31 3*   RDW % 16 8*  --  16 7*  --  16 8* 17 1*   MPV fL 11 1  --  10 9  --  11 2 10 3   NRBC AUTO /100 WBCs 0  --  1  --  1 1       CMP:  Results from last 7 days   Lab Units 05/10/22  0641 05/09/22  0257 05/08/22  0503 05/07/22  1309   POTASSIUM mmol/L 3 8 3 9 4 1 5 0   CHLORIDE mmol/L 107 105 105 104   CO2 mmol/L 24 24 24 23   BUN mg/dL 50* 54* 53* 55*   CREATININE mg/dL 2 04* 2 18* 2 38* 2 54*   CALCIUM mg/dL 8 0* 7 5* 7 8* 8 0*   EGFR ml/min/1 73sq m 19 18 16 15       BMP:  Results from last 7 days   Lab Units 05/10/22  0641 05/09/22  0257 05/08/22  0503 05/07/22  1309   POTASSIUM mmol/L 3 8 3 9 4 1 5 0   CHLORIDE mmol/L 107 105 105 104   CO2 mmol/L 24 24 24 23   BUN mg/dL 50* 54* 53* 55*   CREATININE mg/dL 2 04* 2 18* 2 38* 2 54*   CALCIUM mg/dL 8 0* 7 5* 7 8* 8 0*       No results found for: NTBNP                            Results from last 7 days   Lab Units 05/10/22  0641 05/07/22  1309 05/04/22  0000   INR  1 16 1 87* 2 30*       Lipid Profile:   Lab Results   Component Value Date    CHOL 192 11/15/2013     Lab Results   Component Value Date    HDL 70 11/15/2013     Lab Results   Component Value Date    LDLCALC 108 11/15/2013     Lab Results   Component Value Date    TRIG 69 11/15/2013       Cardiac testing:   No results found for this or any previous visit  No results found for this or any previous visit  No results found for this or any previous visit  No valid procedures specified  No results found for this or any previous visit  Meds/Allergies   all current active meds have been reviewed, current meds:   Current Facility-Administered Medications   Medication Dose Route Frequency    acetaminophen (TYLENOL) tablet 650 mg  650 mg Oral Q6H PRN    aluminum-magnesium hydroxide-simethicone (MYLANTA) oral suspension 30 mL  30 mL Oral Q6H PRN    bumetanide (BUMEX) injection 3 mg  3 mg Intravenous BID    cyanocobalamin (VITAMIN B-12) tablet 250 mcg  250 mcg Oral Daily    ferrous sulfate tablet 325 mg  325 mg Oral Daily With Breakfast    fluticasone (FLONASE) 50 mcg/act nasal spray 1 spray  1 spray Nasal Daily    glycerin-hypromellose- (ARTIFICIAL TEARS) ophthalmic solution 2 drop  2 drop Ophthalmic Q3H PRN    metoprolol succinate (TOPROL-XL) 24 hr tablet 25 mg  25 mg Oral Daily    ondansetron (ZOFRAN) injection 4 mg  4 mg Intravenous Q6H PRN    pantoprazole (PROTONIX) EC tablet 40 mg  40 mg Oral Daily    potassium chloride (K-DUR,KLOR-CON) CR tablet 20 mEq  20 mEq Oral Daily    saccharomyces boulardii (FLORASTOR) capsule 250 mg  250 mg Oral BID    warfarin (COUMADIN) tablet 2 mg  2 mg Oral Daily (warfarin)    and PTA meds:   Prior to Admission Medications   Prescriptions Last Dose Informant Patient Reported? Taking?    Dextran 70-Hypromellose (ARTIFICIAL TEARS) 0 1-0 3 % SOLN  Outside Facility (Specify) Yes No   Sig: Apply to eye   Emollient (CERAVE) CREA  Outside Facility (Specify) Yes No   Sig: Apply topically   Ayfaw-Tlecx-Uhh[place]ti-Pramoxine (TRIPLE ANTIBIOTIC PLUS) 1 % OINT  Outside Facility (Specify) Yes No   Sig: Apply topically   Saline 0 65 % SOLN  Outside Facility (Specify) Yes No   Sig: into each nostril   Skin Protectants, Misc   (CALAZIME SKIN PROTECTANT) PSTE  Outside Facility (Specify) Yes No   Sig: Apply topically   Tetrahydroz-Dextran-PEG-Povid (EYE DROPS ADVANCED RELIEF OP)  Outside Facility (Specify) Yes No   Sig: Apply 1 drop to eye daily as needed   Torsemide 40 MG TABS   No No   Sig: Take 40 mg by mouth 2 (two) times a day   acetaminophen (TYLENOL) 325 mg tablet  Outside Facility (Specify) Yes No   Sig: Take by mouth every 6 (six) hours as needed     clotrimazole (LOTRIMIN) 1 % cream  Outside Facility (Specify) Yes No   Sig: Apply topically daily as needed   cyanocobalamin (VITAMIN B-12) 500 mcg tablet  Outside Facility (Specify) Yes No   Sig: Take by mouth   ferrous sulfate 325 (65 Fe) mg tablet  Outside Facility (Specify) Yes No   Sig: Take 325 mg by mouth daily with breakfast   fluticasone (FLONASE) 50 mcg/act nasal spray  Outside Facility (Specify) Yes No   Si spray into each nostril daily     guaiFENesin (ROBITUSSIN) 100 MG/5ML oral liquid  Outside Facility (Specify) Yes No   Sig: Take 200 mg by mouth 3 (three) times a day as needed for cough     loperamide (IMODIUM) 2 mg capsule  Outside Facility (Specify) Yes No   Sig: Take 2 mg by mouth 3 (three) times a day as needed     metolazone (ZAROXOLYN) 2 5 mg tablet   No No   Sig: Take 1 tablet (2 5 mg total) by mouth if needed (a half-hour prior to diuretic as directed)   metoprolol succinate (TOPROL-XL) 50 mg 24 hr tablet  Outside Facility (Specify) Yes No   Sig: Take 1 tablet by mouth daily   pantoprazole (PROTONIX) 40 mg tablet  Outside Facility (Specify) Yes No   Sig: Take 1 tablet by mouth daily   polyethylene glycol (MiraLax) 17 GM/SCOOP powder  Outside Facility (Specify) Yes No   Sig: Take 17 g by mouth daily   potassium chloride (K-DUR,KLOR-CON) 20 mEq tablet  Outside Facility (Specify) Yes No   Sig: Take 1 tablet by mouth every 12 (twelve) hours   saccharomyces boulardii (Florastor) 250 mg capsule  Outside Facility (Specify) Yes No   Sig: Take 250 mg by mouth 2 (two) times a day   warfarin (COUMADIN) 2 5 mg tablet  Outside Facility (Specify) Yes No   Sig: Take 2 5 mg by mouth     warfarin (COUMADIN) 5 mg tablet  Outside Facility (Specify) Yes No   Sig: Take 5 mg by mouth        Facility-Administered Medications: None     Medications Prior to Admission   Medication    acetaminophen (TYLENOL) 325 mg tablet    clotrimazole (LOTRIMIN) 1 % cream    cyanocobalamin (VITAMIN B-12) 500 mcg tablet    Dextran 70-Hypromellose (ARTIFICIAL TEARS) 0 1-0 3 % SOLN    Emollient (CERAVE) CREA    ferrous sulfate 325 (65 Fe) mg tablet    fluticasone (FLONASE) 50 mcg/act nasal spray    guaiFENesin (ROBITUSSIN) 100 MG/5ML oral liquid    loperamide (IMODIUM) 2 mg capsule    metolazone (ZAROXOLYN) 2 5 mg tablet    metoprolol succinate (TOPROL-XL) 50 mg 24 hr tablet    Qhotr-Xaqjf-Atjmrpu-Pramoxine (TRIPLE ANTIBIOTIC PLUS) 1 % OINT    pantoprazole (PROTONIX) 40 mg tablet    polyethylene glycol (MiraLax) 17 GM/SCOOP powder    potassium chloride (K-DUR,KLOR-CON) 20 mEq tablet    saccharomyces boulardii (Florastor) 250 mg capsule    Saline 0 65 % SOLN    Skin Protectants, Misc  (CALAZIME SKIN PROTECTANT) PSTE    Tetrahydroz-Dextran-PEG-Povid (EYE DROPS ADVANCED RELIEF OP)    Torsemide 40 MG TABS    warfarin (COUMADIN) 2 5 mg tablet    warfarin (COUMADIN) 5 mg tablet            Counseling / Coordination of Care  Total floor / unit time spent today 20 minutes  Greater than 50% of total time was spent with the patient and / or family counseling and / or coordination of care        ** Please Note: Dragon 360 Dictation voice to text software may have been used in the creation of this document   **

## 2022-05-11 LAB
ANION GAP SERPL CALCULATED.3IONS-SCNC: 9 MMOL/L (ref 4–13)
ATRIAL RATE: 72 BPM
BASOPHILS # BLD AUTO: 0.01 THOUSANDS/ΜL (ref 0–0.1)
BASOPHILS NFR BLD AUTO: 0 % (ref 0–1)
BUN SERPL-MCNC: 46 MG/DL (ref 5–25)
CALCIUM SERPL-MCNC: 7.9 MG/DL (ref 8.3–10.1)
CHLORIDE SERPL-SCNC: 107 MMOL/L (ref 100–108)
CO2 SERPL-SCNC: 27 MMOL/L (ref 21–32)
CREAT SERPL-MCNC: 2.03 MG/DL (ref 0.6–1.3)
EOSINOPHIL # BLD AUTO: 0.07 THOUSAND/ΜL (ref 0–0.61)
EOSINOPHIL NFR BLD AUTO: 3 % (ref 0–6)
ERYTHROCYTE [DISTWIDTH] IN BLOOD BY AUTOMATED COUNT: 16.8 % (ref 11.6–15.1)
GFR SERPL CREATININE-BSD FRML MDRD: 19 ML/MIN/1.73SQ M
GLUCOSE SERPL-MCNC: 93 MG/DL (ref 65–140)
HCT VFR BLD AUTO: 26.6 % (ref 34.8–46.1)
HGB BLD-MCNC: 8.5 G/DL (ref 11.5–15.4)
IMM GRANULOCYTES # BLD AUTO: 0.02 THOUSAND/UL (ref 0–0.2)
IMM GRANULOCYTES NFR BLD AUTO: 1 % (ref 0–2)
INR PPP: 1.26 (ref 0.84–1.19)
LYMPHOCYTES # BLD AUTO: 0.8 THOUSANDS/ΜL (ref 0.6–4.47)
LYMPHOCYTES NFR BLD AUTO: 29 % (ref 14–44)
MCH RBC QN AUTO: 36.3 PG (ref 26.8–34.3)
MCHC RBC AUTO-ENTMCNC: 32 G/DL (ref 31.4–37.4)
MCV RBC AUTO: 114 FL (ref 82–98)
MONOCYTES # BLD AUTO: 0.35 THOUSAND/ΜL (ref 0.17–1.22)
MONOCYTES NFR BLD AUTO: 13 % (ref 4–12)
NEUTROPHILS # BLD AUTO: 1.55 THOUSANDS/ΜL (ref 1.85–7.62)
NEUTS SEG NFR BLD AUTO: 54 % (ref 43–75)
NRBC BLD AUTO-RTO: 0 /100 WBCS
PLATELET # BLD AUTO: 174 THOUSANDS/UL (ref 149–390)
PMV BLD AUTO: 11.2 FL (ref 8.9–12.7)
POTASSIUM SERPL-SCNC: 3.7 MMOL/L (ref 3.5–5.3)
PROTHROMBIN TIME: 15.6 SECONDS (ref 11.6–14.5)
QRS AXIS: 96 DEGREES
QRSD INTERVAL: 124 MS
QT INTERVAL: 450 MS
QTC INTERVAL: 489 MS
RBC # BLD AUTO: 2.34 MILLION/UL (ref 3.81–5.12)
SODIUM SERPL-SCNC: 143 MMOL/L (ref 136–145)
T WAVE AXIS: -78 DEGREES
VENTRICULAR RATE: 71 BPM
WBC # BLD AUTO: 2.8 THOUSAND/UL (ref 4.31–10.16)

## 2022-05-11 PROCEDURE — 93010 ELECTROCARDIOGRAM REPORT: CPT | Performed by: INTERNAL MEDICINE

## 2022-05-11 PROCEDURE — 80048 BASIC METABOLIC PNL TOTAL CA: CPT | Performed by: HOSPITALIST

## 2022-05-11 PROCEDURE — 99232 SBSQ HOSP IP/OBS MODERATE 35: CPT | Performed by: INTERNAL MEDICINE

## 2022-05-11 PROCEDURE — 85610 PROTHROMBIN TIME: CPT | Performed by: HOSPITALIST

## 2022-05-11 PROCEDURE — 99232 SBSQ HOSP IP/OBS MODERATE 35: CPT | Performed by: PHYSICIAN ASSISTANT

## 2022-05-11 PROCEDURE — 85025 COMPLETE CBC W/AUTO DIFF WBC: CPT | Performed by: HOSPITALIST

## 2022-05-11 RX ORDER — WARFARIN SODIUM 5 MG/1
5 TABLET ORAL
Status: COMPLETED | OUTPATIENT
Start: 2022-05-11 | End: 2022-05-11

## 2022-05-11 RX ADMIN — POTASSIUM CHLORIDE 20 MEQ: 20 TABLET, EXTENDED RELEASE ORAL at 09:07

## 2022-05-11 RX ADMIN — Medication 250 MG: at 09:07

## 2022-05-11 RX ADMIN — BUMETANIDE 3 MG: 0.25 INJECTION, SOLUTION INTRAMUSCULAR; INTRAVENOUS at 09:07

## 2022-05-11 RX ADMIN — PANTOPRAZOLE SODIUM 40 MG: 40 TABLET, DELAYED RELEASE ORAL at 09:07

## 2022-05-11 RX ADMIN — BUMETANIDE 3 MG: 0.25 INJECTION, SOLUTION INTRAMUSCULAR; INTRAVENOUS at 17:30

## 2022-05-11 RX ADMIN — FERROUS SULFATE TAB 325 MG (65 MG ELEMENTAL FE) 325 MG: 325 (65 FE) TAB at 09:07

## 2022-05-11 RX ADMIN — METOPROLOL SUCCINATE 25 MG: 25 TABLET, FILM COATED, EXTENDED RELEASE ORAL at 09:07

## 2022-05-11 RX ADMIN — WARFARIN SODIUM 5 MG: 5 TABLET ORAL at 17:29

## 2022-05-11 RX ADMIN — FLUTICASONE PROPIONATE 1 SPRAY: 50 SPRAY, METERED NASAL at 09:14

## 2022-05-11 RX ADMIN — CYANOCOBALAMIN TAB 500 MCG 250 MCG: 500 TAB at 09:07

## 2022-05-11 RX ADMIN — Medication 250 MG: at 17:29

## 2022-05-11 NOTE — PROGRESS NOTES
[place]     Progress Note Jeff Ceballos 2/28/1923, 80 y o  female  MRN: [id number]  Unit/Bed#: -01 Encounter: [id number]  Primary Care Provider: No primary care provider on file  Date and time admitted to hospital: 5/7/2022 12:51 PM    * Acute on chronic combined systolic and diastolic CHF (congestive heart failure) (HCC)  Assessment & Plan  Wt Readings from Last 3 Encounters:   05/11/22 53 9 kg (118 lb 13 3 oz)   03/25/22 58 5 kg (129 lb)   03/10/22 53 6 kg (118 lb 2 7 oz)     · Patient has been having medications adjusted outpatient due to ongoing fluid retention in lower extremeties  · Weight as of 3/10/22: 118lbs  Patient reports her weight had gone up to 130lbs  · Encourage standing weights as opposed to bed scale - weight downtrending since admit  · On torsemide 40mg BID with 2 5mg PRN metolazone as outpatient  · Currently receiving 3 mg IV Bumex BID  · Daily weights  · Cardiology consulted - appreciate recommendations    Continue IV diuresis, hopeful transition to oral diuretic in 24-48 hours    CAMPBELL (acute kidney injury) ([facility] [address] )  Assessment & Plan  · Creatinine was 1 95 as of 3/10/2022, prior to this in 2014 baseline was approximately 1 3-1 5  · Suspect due to volume overload versus advancement of underlying kidney disease  · 2 54 at time of admission  · Creatinine remains stable in setting of IV diuresis - 2 03  · Trend BMP daily    Permanent atrial fibrillation (HCC)  Assessment & Plan  · Bi-V dual PPM Last interrogation 3/2022- functioning normally  · Managed on coumadin 2 5mg daily, 5mg Mondays  · Coumadin resumed 5/9  · Trend INR daily - subtherapeutic in setting of recent vitamin K administration and holding of Coumadin    Fall  Assessment & Plan  · Mechanical fall due to loose shoes  · Typically ambulates with a walker  · PT/OT ordered - initially recommended home with VNA services, now recommending rehab to which patient is agreeable  · CT head negative for acute intracranial abnormalities  · Evaluated and cleared by trauma team for admission at [address] [place] [address] Road  · Recommended initially holding Coumadin, administered vitamin K IV given large hematoma  · Hb remains stable from admission  · Coumadin resumed     VTE Pharmacologic Prophylaxis: VTE Score: 5 High Risk (Score >/= 5) - Pharmacological DVT Prophylaxis Ordered: warfarin (Coumadin)  Sequential Compression Devices Ordered  Patient Centered Rounds: I performed bedside rounds with nursing staff today  Discussions with Specialists or Other Care Team Provider: CM, cardiology AP    Education and Discussions with Family / Patient: Patient declined call to   Time Spent for Care: 30 minutes  More than 50% of total time spent on counseling and coordination of care as described above  Current Length of Stay: 4 day(s)  Current Patient Status: Inpatient   Certification Statement: The patient will continue to require additional inpatient hospital stay due to IV diuresis  Discharge Plan: Anticipate discharge in 24-48 hrs to rehab facility  Code Status: Level 1 - Full Code    Subjective:   Patient reports feeling better each day, still with some shortness of breath  Overall feels quite weak  Denies chest pain/palpitations, nausea vomiting, abdominal pain  Objective:     Vitals:   Temp (24hrs), Av °F (36 7 °C), Min:97 8 °F (36 6 °C), Max:98 1 °F (36 7 °C)    Temp:  [97 8 °F (36 6 °C)-98 1 °F (36 7 °C)] 97 8 °F (36 6 °C)  HR:  [56-65] 65  Resp:  [14-19] 19  BP: (120-127)/(63-80) 127/78  SpO2:  [90 %-99 %] 90 %  Body mass index is 21 05 kg/m²  Input and Output Summary (last 24 hours): Intake/Output Summary (Last 24 hours) at 2022 1205  Last data filed at 2022 1100  Gross per 24 hour   Intake 200 ml   Output 2678 ml   Net -2478 ml       Physical Exam:   Physical Exam  Vitals and nursing note reviewed  Constitutional:       Appearance: Normal appearance        Comments: Appears comfortable, no acute distress   HENT:      Head: Normocephalic  Eyes:      General: No scleral icterus  Extraocular Movements: Extraocular movements intact  Conjunctiva/sclera: Conjunctivae normal    Cardiovascular:      Rate and Rhythm: Normal rate and regular rhythm  Heart sounds: S1 normal and S2 normal    Pulmonary:      Effort: Pulmonary effort is normal       Breath sounds: Normal breath sounds  No wheezing or rhonchi  Abdominal:      General: Bowel sounds are normal       Palpations: Abdomen is soft  Tenderness: There is no abdominal tenderness  There is no guarding or rebound  Musculoskeletal:         General: No swelling, tenderness or deformity  Cervical back: Normal range of motion  Comments: Able to move upper/lower extremities bilaterally, 1+ pitting edema lower extremities bilaterally   Skin:     General: Skin is warm and dry  Neurological:      Mental Status: She is alert and oriented to person, place, and time  Psychiatric:         Mood and Affect: Mood normal          Speech: Speech normal          Behavior: Behavior normal           Additional Data:     Labs:  Results from last 7 days   Lab Units 05/11/22  0339   WBC Thousand/uL 2 80*   HEMOGLOBIN g/dL 8 5*   HEMATOCRIT % 26 6*   PLATELETS Thousands/uL 174   NEUTROS PCT % 54   LYMPHS PCT % 29   MONOS PCT % 13*   EOS PCT % 3     Results from last 7 days   Lab Units 05/11/22  0339   SODIUM mmol/L 143   POTASSIUM mmol/L 3 7   CHLORIDE mmol/L 107   CO2 mmol/L 27   BUN mg/dL 46*   CREATININE mg/dL 2 03*   ANION GAP mmol/L 9   CALCIUM mg/dL 7 9*   GLUCOSE RANDOM mg/dL 93     Results from last 7 days   Lab Units 05/11/22  0339   INR  1 26*                   Lines/Drains:  Invasive Devices  Report    Peripheral Intravenous Line  Duration           Peripheral IV 05/09/22 Dorsal (posterior); Left Hand 2 days          Drain  Duration           External Urinary Catheter 2 days                      Imaging: No pertinent imaging reviewed  Recent Cultures (last 7 days):         Last 24 Hours Medication List:   Current Facility-Administered Medications   Medication Dose Route Frequency Provider Last Rate    acetaminophen  650 mg Oral Q6H PRN Kaelyn DALEY PA-C      aluminum-magnesium hydroxide-simethicone  30 mL Oral Q6H PRN Kaelyn DALEY PA-C      bumetanide  3 mg Intravenous BID Les Crum MD      vitamin B-12  250 mcg Oral Daily Sapna DALEY PA-C      ferrous sulfate  325 mg Oral Daily With Breakfast Kaelyn DALEY PA-C      fluticasone  1 spray Nasal Daily Dinorah Rehman PA-C      glycerin-hypromellose-  2 drop Ophthalmic Q3H PRN Sapna DALEY PA-C      metoprolol succinate  25 mg Oral Daily Les Crum MD      ondansetron  4 mg Intravenous Q6H PRN Kaelyn DALEY PA-C      pantoprazole  40 mg Oral Daily Sapna DALEY PA-C      polyethylene glycol  17 g Oral Daily PRN Zara Matamoros PA-C      potassium chloride  20 mEq Oral Daily Sapna DALEY PA-C      saccharomyces boulardii  250 mg Oral BID Kaelyn DALEY PA-C      warfarin  2 mg Oral Daily (warfarin) Dinorah Rehman PA-C          Today, Patient Was Seen By: Zara Matamoros PA-C    **Please Note: This note may have been constructed using a voice recognition system  **

## 2022-05-11 NOTE — PROGRESS NOTES
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:  Currently on IV Bumex 3 mg BID and will continue today as her creat is stable and she continues with some bibasilar rales  · Echocardiogram 5/9:  EF 55% with no WMA, moderately reduced RV function, severe RA dilatation, moderate LA dilatation, moderate AS, mild-moderate MR, severe TR  · Home diuretic regimen: Torsemide 40 mg BID with as needed metolazone 2 5 mg  · Dry weight:  118 lb  · Weight on admission:  125 lb per bed scale  · Weight today:  118 down form 120 lb per standing scale yesterday  · I&Os:  Output 24 hours -2 2 L; no PO intake recorded for day shift, cannot calculate net negative status  2  Permanent atrial fibrillation:  Maintained on Toprol-XL 25 mg daily  Anticoagulated on Coumadin with INR 1 2 today  3  Bi V PPM: BSC DC PPM (not MRI 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%  no high rate episodes, chronic AFib, normal device function on interrogation 03/23/2022  4  CKD:  Unclear baseline was 1 95 03/2022  Creatinine 2 54 POA improved to 2 0 today with diuresis which is stable from yesterday     Plan/Recommendations:  · Currently on IV Bumex 3 mg BID and will continue today as her creat is stable and she continues with some bibasilar rales  · Monitor I&Os, renal function, electrolytes and standing weight  · Maintain potassium >4 and magnesium >2  · Continue beta-blocker  · Continue Coumadin for anticoagulation with goal INR 2-3        _________________________________________________________________    Subjective  Patient notes fatigue this morning as she states she did not sleep well last night  She feels her lower extremity edema is improving  She denies any shortness of breath at rest but continues to note some dyspnea with exertion        Review of Systems   Constitutional: Positive for malaise/fatigue  Negative for chills and fever  HENT: Negative for congestion  Cardiovascular: Positive for dyspnea on exertion and leg swelling  Negative for chest pain, orthopnea and palpitations  Respiratory: Negative for cough, shortness of breath (no SOB at rest) and wheezing  Endocrine: Negative  Hematologic/Lymphatic: Negative  Skin: Negative  Musculoskeletal: Negative  Gastrointestinal: Negative for bloating, abdominal pain, nausea and vomiting  Genitourinary: Negative  Neurological: Negative for dizziness and light-headedness  Psychiatric/Behavioral: Negative  All other systems reviewed and are negative  Objective:   Vitals: Blood pressure 127/78, pulse 65, temperature 97 8 °F (36 6 °C), temperature source Oral, resp  rate 19, height 5' 3" (1 6 m), weight 53 9 kg (118 lb 13 3 oz), SpO2 90 %  ,     Wt Readings from Last 3 Encounters:   05/11/22 53 9 kg (118 lb 13 3 oz)   03/25/22 58 5 kg (129 lb)   03/10/22 53 6 kg (118 lb 2 7 oz)        Lab Results   Component Value Date    CREATININE 2 03 (H) 05/11/2022    CREATININE 2 04 (H) 05/10/2022    CREATININE 2 18 (H) 05/09/2022         Body mass index is 21 05 kg/m²  ,     Systolic (86RSC), MHN:154 , Min:120 , JFX:936     Diastolic (94CAC), CZQ:09, Min:63, Max:80          Intake/Output Summary (Last 24 hours) at 5/11/2022 0829  Last data filed at 5/11/2022 0516  Gross per 24 hour   Intake 200 ml   Output 2420 ml   Net -2220 ml     Weight (last 2 days)     Date/Time Weight    05/11/22 0600 53 9 (118 83)    05/10/22 0600 54 7 (120 6)    05/09/22 1145 56 7 (125)    05/09/22 0456 56 9 (125 44)            Telemetry Review: Not on tele        Physical Exam  Vitals reviewed  Constitutional:       General: She is not in acute distress  HENT:      Head: Normocephalic and atraumatic  Mouth/Throat:      Mouth: Mucous membranes are moist    Cardiovascular:      Rate and Rhythm: Normal rate  Rhythm irregularly irregular        Heart sounds: S1 normal and S2 normal  Murmur heard  Comments: Trace to +1 B/L LE edema  Pulmonary:      Effort: Pulmonary effort is normal  No respiratory distress  Breath sounds: Examination of the right-lower field reveals rales  Examination of the left-lower field reveals rales  Rales present  Abdominal:      General: Bowel sounds are normal  There is no distension  Palpations: Abdomen is soft  Musculoskeletal:         General: Normal range of motion  Cervical back: Normal range of motion and neck supple  Right lower leg: Edema present  Left lower leg: Edema present  Skin:     General: Skin is warm and dry  Neurological:      Mental Status: She is alert and oriented to person, place, and time     Psychiatric:         Mood and Affect: Mood normal          LABORATORY RESULTS      CBC with diff:   Results from last 7 days   Lab Units 05/11/22  0339 05/10/22  0641 05/09/22  1639 05/09/22  0257 05/08/22  1746 05/08/22  0503 05/07/22  1309   WBC Thousand/uL 2 80* 3 45*  --  3 80*  --  3 96* 4 15*   HEMOGLOBIN g/dL 8 5* 9 1* 8 8* 8 5* 8 8* 8 4* 8 6*   HEMATOCRIT % 26 6* 27 7* 28 1* 26 3* 27 7* 26 7* 27 5*   MCV fL 114* 114*  --  114*  --  116* 117*   PLATELETS Thousands/uL 174 155  --  147*  --  152 157   MCH pg 36 3* 37 4*  --  37 0*  --  36 4* 36 4*   MCHC g/dL 32 0 32 9  --  32 3  --  31 5 31 3*   RDW % 16 8* 16 8*  --  16 7*  --  16 8* 17 1*   MPV fL 11 2 11 1  --  10 9  --  11 2 10 3   NRBC AUTO /100 WBCs 0 0  --  1  --  1 1       CMP:  Results from last 7 days   Lab Units 05/11/22  0339 05/10/22  0641 05/09/22  0257 05/08/22  0503 05/07/22  1309   POTASSIUM mmol/L 3 7 3 8 3 9 4 1 5 0   CHLORIDE mmol/L 107 107 105 105 104   CO2 mmol/L 27 24 24 24 23   BUN mg/dL 46* 50* 54* 53* 55*   CREATININE mg/dL 2 03* 2 04* 2 18* 2 38* 2 54*   CALCIUM mg/dL 7 9* 8 0* 7 5* 7 8* 8 0*   EGFR ml/min/1 73sq m 19 19 18 16 15       BMP:  Results from last 7 days   Lab Units 05/11/22  0339 05/10/22  7326 05/09/22  0257 05/08/22  0503 05/07/22  1309   POTASSIUM mmol/L 3 7 3 8 3 9 4 1 5 0   CHLORIDE mmol/L 107 107 105 105 104   CO2 mmol/L 27 24 24 24 23   BUN mg/dL 46* 50* 54* 53* 55*   CREATININE mg/dL 2 03* 2 04* 2 18* 2 38* 2 54*   CALCIUM mg/dL 7 9* 8 0* 7 5* 7 8* 8 0*       No results found for: NTBNP                            Results from last 7 days   Lab Units 05/11/22  0339 05/10/22  0641 05/07/22  1309   INR  1 26* 1 16 1 87*       Lipid Profile:   Lab Results   Component Value Date    CHOL 192 11/15/2013     Lab Results   Component Value Date    HDL 70 11/15/2013     Lab Results   Component Value Date    LDLCALC 108 11/15/2013     Lab Results   Component Value Date    TRIG 69 11/15/2013       Cardiac testing:   No results found for this or any previous visit  No results found for this or any previous visit  No results found for this or any previous visit  No valid procedures specified  No results found for this or any previous visit          Meds/Allergies   current meds:   Current Facility-Administered Medications   Medication Dose Route Frequency    acetaminophen (TYLENOL) tablet 650 mg  650 mg Oral Q6H PRN    aluminum-magnesium hydroxide-simethicone (MYLANTA) oral suspension 30 mL  30 mL Oral Q6H PRN    bumetanide (BUMEX) injection 3 mg  3 mg Intravenous BID    cyanocobalamin (VITAMIN B-12) tablet 250 mcg  250 mcg Oral Daily    ferrous sulfate tablet 325 mg  325 mg Oral Daily With Breakfast    fluticasone (FLONASE) 50 mcg/act nasal spray 1 spray  1 spray Nasal Daily    glycerin-hypromellose- (ARTIFICIAL TEARS) ophthalmic solution 2 drop  2 drop Ophthalmic Q3H PRN    metoprolol succinate (TOPROL-XL) 24 hr tablet 25 mg  25 mg Oral Daily    ondansetron (ZOFRAN) injection 4 mg  4 mg Intravenous Q6H PRN    pantoprazole (PROTONIX) EC tablet 40 mg  40 mg Oral Daily    polyethylene glycol (MIRALAX) packet 17 g  17 g Oral Daily PRN    potassium chloride (K-DUR,KLOR-CON) CR tablet 20 mEq  20 mEq Oral Daily    saccharomyces boulardii (FLORASTOR) capsule 250 mg  250 mg Oral BID    warfarin (COUMADIN) tablet 2 mg  2 mg Oral Daily (warfarin)     Medications Prior to Admission   Medication    acetaminophen (TYLENOL) 325 mg tablet    clotrimazole (LOTRIMIN) 1 % cream    cyanocobalamin (VITAMIN B-12) 500 mcg tablet    Dextran 70-Hypromellose (ARTIFICIAL TEARS) 0 1-0 3 % SOLN    Emollient (CERAVE) CREA    ferrous sulfate 325 (65 Fe) mg tablet    fluticasone (FLONASE) 50 mcg/act nasal spray    guaiFENesin (ROBITUSSIN) 100 MG/5ML oral liquid    loperamide (IMODIUM) 2 mg capsule    metolazone (ZAROXOLYN) 2 5 mg tablet    metoprolol succinate (TOPROL-XL) 50 mg 24 hr tablet    Wdkri-Bulwv-Sjnfqxz-Pramoxine (TRIPLE ANTIBIOTIC PLUS) 1 % OINT    pantoprazole (PROTONIX) 40 mg tablet    polyethylene glycol (MiraLax) 17 GM/SCOOP powder    potassium chloride (K-DUR,KLOR-CON) 20 mEq tablet    saccharomyces boulardii (Florastor) 250 mg capsule    Saline 0 65 % SOLN    Skin Protectants, Misc  (CALAZIME SKIN PROTECTANT) PSTE    Tetrahydroz-Dextran-PEG-Povid (EYE DROPS ADVANCED RELIEF OP)    Torsemide 40 MG TABS    warfarin (COUMADIN) 2 5 mg tablet    warfarin (COUMADIN) 5 mg tablet            Counseling / Coordination of Care  Total floor / unit time spent today 20 minutes  Greater than 50% of total time was spent with the patient and / or family counseling and / or coordination of care  ** Please Note: Dragon 360 Dictation voice to text software may have been used in the creation of this document   **

## 2022-05-11 NOTE — ASSESSMENT & PLAN NOTE
Wt Readings from Last 3 Encounters:   05/11/22 53 9 kg (118 lb 13 3 oz)   03/25/22 58 5 kg (129 lb)   03/10/22 53 6 kg (118 lb 2 7 oz)     · Patient has been having medications adjusted outpatient due to ongoing fluid retention in lower extremeties  · Weight as of 3/10/22: 118lbs  Patient reports her weight had gone up to 130lbs  · Encourage standing weights as opposed to bed scale - weight downtrending since admit  · On torsemide 40mg BID with 2 5mg PRN metolazone as outpatient  · Currently receiving 3 mg IV Bumex BID  · Daily weights  · Cardiology consulted - appreciate recommendations    Continue IV diuresis, hopeful transition to oral diuretic in 24-48 hours

## 2022-05-11 NOTE — CASE MANAGEMENT
Case Management Discharge Planning Note    Patient name Pamela Valdez Luite Bobby [id number] MRN [id number]  : 1923 Date 2022       Current Admission Date: 2022  Current Admission Diagnosis:Acute on chronic combined systolic and diastolic CHF (congestive heart failure) [facility])   Patient Active Problem List    Diagnosis Date Noted    Acute on chronic combined systolic and diastolic CHF (congestive heart failure) ([facility] ) 2022    Fall 2022    CAMPBELL (acute kidney injury) ([facility] ) 2022    Pacemaker at end of battery life 03/10/2022    Chronic diastolic CHF (congestive heart failure) ([place] [address] ) 2021    Permanent atrial fibrillation ([place] [address] ) 2014    Pericardial effusion 2014    Coronary artery disease 2013    Hyperlipidemia 08/15/2012    Hypertension 2012      LOS (days): 4  Geometric Mean LOS (GMLOS) (days): 3 80  Days to GMLOS:-0 2     OBJECTIVE:  Risk of Unplanned Readmission Score: 20 47         Current admission status: Inpatient   Preferred Pharmacy:   [address] [place] [address]  Phone: [phone] Fax: [phone]    Primary Care Provider: No primary care provider on file  Primary Insurance: MEDICARE  Secondary Insurance:  FOR LIFE    DISCHARGE DETAILS:    IMM Given (Date):: 22  IMM Given to[de-identified] Patient     Additional Comments: Received a call from Rachel Aguirre at [facility] who states after review with her team, they feel pt would benefit from rehab  Met with pt to discuss same  Pt is agreeable  Pt is requesting rehab at [facility]  [facility] referral sent  [facility] accept  Pt reports received COVID vaccine Pf[employer]; 2 shots but no booster  CM informed Kaelyn at [facility] of same  CM called and spoke to pt's daughter Quyen Gutierres to provide update  Quyen Indiogilles is aware and agreeable

## 2022-05-11 NOTE — PLAN OF CARE
Problem: MOBILITY - ADULT  Goal: Maintain or return to baseline ADL function  Description: INTERVENTIONS:  -  Assess patient's ability to carry out ADLs; assess patient's baseline for ADL function and identify physical deficits which impact ability to perform ADLs (bathing, care of mouth/teeth, toileting, grooming, dressing, etc )  - Assess/evaluate cause of self-care deficits   - Assess range of motion  - Assess patient's mobility; develop plan if impaired  - Assess patient's need for assistive devices and provide as appropriate  - Encourage maximum independence but intervene and supervise when necessary  - Involve family in performance of ADLs  - Assess for home care needs following discharge   - Consider OT consult to assist with ADL evaluation and planning for discharge  - Provide patient education as appropriate  Outcome: Progressing  Goal: Maintains/Returns to pre admission functional level  Description: INTERVENTIONS:  - Perform BMAT or MOVE assessment daily    - Set and communicate daily mobility goal to care team and patient/family/caregiver  - Collaborate with rehabilitation services on mobility goals if consulted  - Perform Range of Motion 2 times a day  - Reposition patient every 2 hours    - Dangle patient 2 times a day  - Stand patient 2 times a day  - Ambulate patient 2 times a day  - Out of bed to chair 2 times a day   - Out of bed for meals 2 times a day  - Out of bed for toileting  - Record patient progress and toleration of activity level   Outcome: Progressing     Problem: Prexisting or High Potential for Compromised Skin Integrity  Goal: Skin integrity is maintained or improved  Description: INTERVENTIONS:  - Identify patients at risk for skin breakdown  - Assess and monitor skin integrity  - Assess and monitor nutrition and hydration status  - Monitor labs   - Assess for incontinence   - Turn and reposition patient  - Assist with mobility/ambulation  - Relieve pressure over bony prominences  - Avoid friction and shearing  - Provide appropriate hygiene as needed including keeping skin clean and dry  - Evaluate need for skin moisturizer/barrier cream  - Collaborate with interdisciplinary team   - Patient/family teaching  - Consider wound care consult   Outcome: Progressing

## 2022-05-11 NOTE — ASSESSMENT & PLAN NOTE
· Mechanical fall due to loose shoes  · Typically ambulates with a walker  · PT/OT ordered - initially recommended home with VNA services, now recommending rehab to which patient is agreeable  · CT head negative for acute intracranial abnormalities  · Evaluated and cleared by trauma team for admission at [address]  · Recommended initially holding Coumadin, administered vitamin K IV given large hematoma  · Hb remains stable from admission  · Coumadin resumed 5/9

## 2022-05-11 NOTE — ASSESSMENT & PLAN NOTE
· Creatinine was 1 95 as of 3/10/2022, prior to this in 2014 baseline was approximately 1 3-1 5  · Suspect due to volume overload versus advancement of underlying kidney disease  · 2 54 at time of admission  · Creatinine remains stable in setting of IV diuresis - 2 03  · Trend BMP daily

## 2022-05-11 NOTE — PHYSICAL THERAPY NOTE
PT cancel   05/11/22 5715   PT Last Visit   PT Visit Date 05/11/22   Note Type   Note Type Cancelled Session   Cancel Reasons   (pt declines session at presents, too tired  Instr to get up for supper and amb around bed to chair for meal with staff assistance   Pt agreeable )

## 2022-05-12 ENCOUNTER — APPOINTMENT (INPATIENT)
Dept: ULTRASOUND IMAGING | Facility: HOSPITAL | Age: 87
DRG: 604 | End: 2022-05-12
Payer: MEDICARE

## 2022-05-12 LAB
ANION GAP SERPL CALCULATED.3IONS-SCNC: 11 MMOL/L (ref 4–13)
BASOPHILS # BLD AUTO: 0 THOUSANDS/ΜL (ref 0–0.1)
BASOPHILS NFR BLD AUTO: 0 % (ref 0–1)
BUN SERPL-MCNC: 41 MG/DL (ref 5–25)
CALCIUM SERPL-MCNC: 8.3 MG/DL (ref 8.3–10.1)
CHLORIDE SERPL-SCNC: 107 MMOL/L (ref 100–108)
CO2 SERPL-SCNC: 26 MMOL/L (ref 21–32)
CREAT SERPL-MCNC: 1.86 MG/DL (ref 0.6–1.3)
EOSINOPHIL # BLD AUTO: 0.03 THOUSAND/ΜL (ref 0–0.61)
EOSINOPHIL NFR BLD AUTO: 1 % (ref 0–6)
ERYTHROCYTE [DISTWIDTH] IN BLOOD BY AUTOMATED COUNT: 16.8 % (ref 11.6–15.1)
GFR SERPL CREATININE-BSD FRML MDRD: 22 ML/MIN/1.73SQ M
GLUCOSE SERPL-MCNC: 106 MG/DL (ref 65–140)
HCT VFR BLD AUTO: 28.2 % (ref 34.8–46.1)
HGB BLD-MCNC: 9.1 G/DL (ref 11.5–15.4)
IMM GRANULOCYTES # BLD AUTO: 0.02 THOUSAND/UL (ref 0–0.2)
IMM GRANULOCYTES NFR BLD AUTO: 1 % (ref 0–2)
INR PPP: 1.34 (ref 0.84–1.19)
LYMPHOCYTES # BLD AUTO: 0.7 THOUSANDS/ΜL (ref 0.6–4.47)
LYMPHOCYTES NFR BLD AUTO: 21 % (ref 14–44)
MCH RBC QN AUTO: 36.8 PG (ref 26.8–34.3)
MCHC RBC AUTO-ENTMCNC: 32.3 G/DL (ref 31.4–37.4)
MCV RBC AUTO: 114 FL (ref 82–98)
MONOCYTES # BLD AUTO: 0.37 THOUSAND/ΜL (ref 0.17–1.22)
MONOCYTES NFR BLD AUTO: 11 % (ref 4–12)
NEUTROPHILS # BLD AUTO: 2.25 THOUSANDS/ΜL (ref 1.85–7.62)
NEUTS SEG NFR BLD AUTO: 66 % (ref 43–75)
NRBC BLD AUTO-RTO: 1 /100 WBCS
PLATELET # BLD AUTO: 206 THOUSANDS/UL (ref 149–390)
PMV BLD AUTO: 10.8 FL (ref 8.9–12.7)
POTASSIUM SERPL-SCNC: 3.8 MMOL/L (ref 3.5–5.3)
PROTHROMBIN TIME: 16.4 SECONDS (ref 11.6–14.5)
RBC # BLD AUTO: 2.47 MILLION/UL (ref 3.81–5.12)
SODIUM SERPL-SCNC: 144 MMOL/L (ref 136–145)
WBC # BLD AUTO: 3.37 THOUSAND/UL (ref 4.31–10.16)

## 2022-05-12 PROCEDURE — 76770 US EXAM ABDO BACK WALL COMP: CPT

## 2022-05-12 PROCEDURE — 85610 PROTHROMBIN TIME: CPT | Performed by: PHYSICIAN ASSISTANT

## 2022-05-12 PROCEDURE — 99232 SBSQ HOSP IP/OBS MODERATE 35: CPT | Performed by: INTERNAL MEDICINE

## 2022-05-12 PROCEDURE — 85025 COMPLETE CBC W/AUTO DIFF WBC: CPT | Performed by: PHYSICIAN ASSISTANT

## 2022-05-12 PROCEDURE — 99232 SBSQ HOSP IP/OBS MODERATE 35: CPT | Performed by: PHYSICIAN ASSISTANT

## 2022-05-12 PROCEDURE — 80048 BASIC METABOLIC PNL TOTAL CA: CPT | Performed by: NURSE PRACTITIONER

## 2022-05-12 RX ORDER — TORSEMIDE 20 MG/1
40 TABLET ORAL 2 TIMES DAILY
Status: DISCONTINUED | OUTPATIENT
Start: 2022-05-13 | End: 2022-05-13

## 2022-05-12 RX ORDER — WARFARIN SODIUM 5 MG/1
5 TABLET ORAL
Status: COMPLETED | OUTPATIENT
Start: 2022-05-12 | End: 2022-05-12

## 2022-05-12 RX ADMIN — Medication 250 MG: at 18:10

## 2022-05-12 RX ADMIN — BUMETANIDE 3 MG: 0.25 INJECTION, SOLUTION INTRAMUSCULAR; INTRAVENOUS at 10:05

## 2022-05-12 RX ADMIN — FERROUS SULFATE TAB 325 MG (65 MG ELEMENTAL FE) 325 MG: 325 (65 FE) TAB at 10:05

## 2022-05-12 RX ADMIN — BUMETANIDE 3 MG: 0.25 INJECTION, SOLUTION INTRAMUSCULAR; INTRAVENOUS at 18:10

## 2022-05-12 RX ADMIN — Medication 250 MG: at 10:05

## 2022-05-12 RX ADMIN — PANTOPRAZOLE SODIUM 40 MG: 40 TABLET, DELAYED RELEASE ORAL at 10:05

## 2022-05-12 RX ADMIN — CYANOCOBALAMIN TAB 500 MCG 250 MCG: 500 TAB at 10:13

## 2022-05-12 RX ADMIN — POTASSIUM CHLORIDE 20 MEQ: 20 TABLET, EXTENDED RELEASE ORAL at 10:05

## 2022-05-12 RX ADMIN — FLUTICASONE PROPIONATE 1 SPRAY: 50 SPRAY, METERED NASAL at 10:10

## 2022-05-12 RX ADMIN — METOPROLOL SUCCINATE 25 MG: 25 TABLET, FILM COATED, EXTENDED RELEASE ORAL at 10:05

## 2022-05-12 RX ADMIN — WARFARIN SODIUM 5 MG: 5 TABLET ORAL at 18:10

## 2022-05-12 NOTE — PLAN OF CARE
Problem: MOBILITY - ADULT  Goal: Maintain or return to baseline ADL function  Description: INTERVENTIONS:  -  Assess patient's ability to carry out ADLs; assess patient's baseline for ADL function and identify physical deficits which impact ability to perform ADLs (bathing, care of mouth/teeth, toileting, grooming, dressing, etc )  - Assess/evaluate cause of self-care deficits   - Assess range of motion  - Assess patient's mobility; develop plan if impaired  - Assess patient's need for assistive devices and provide as appropriate  - Encourage maximum independence but intervene and supervise when necessary  - Involve family in performance of ADLs  - Assess for home care needs following discharge   - Consider OT consult to assist with ADL evaluation and planning for discharge  - Provide patient education as appropriate  Outcome: Progressing  Goal: Maintains/Returns to pre admission functional level  Description: INTERVENTIONS:  - Perform BMAT or MOVE assessment daily    - Set and communicate daily mobility goal to care team and patient/family/caregiver  - Collaborate with rehabilitation services on mobility goals if consulted  - Perform Range of Motion 2 times a day  - Reposition patient every 2 hours    - Dangle patient 2 times a day  - Stand patient 2 times a day  - Ambulate patient 2 times a day  - Out of bed to chair 2 times a day   - Out of bed for meals 2 times a day  - Out of bed for toileting  - Record patient progress and toleration of activity level   Outcome: Progressing     Problem: Prexisting or High Potential for Compromised Skin Integrity  Goal: Skin integrity is maintained or improved  Description: INTERVENTIONS:  - Identify patients at risk for skin breakdown  - Assess and monitor skin integrity  - Assess and monitor nutrition and hydration status  - Monitor labs   - Assess for incontinence   - Turn and reposition patient  - Assist with mobility/ambulation  - Relieve pressure over bony prominences  - Avoid friction and shearing  - Provide appropriate hygiene as needed including keeping skin clean and dry  - Evaluate need for skin moisturizer/barrier cream  - Collaborate with interdisciplinary team   - Patient/family teaching  - Consider wound care consult   Outcome: Progressing Principal Discharge DX:	Alcoholic intoxication without complication

## 2022-05-12 NOTE — ASSESSMENT & PLAN NOTE
· Noted on CXR  · Previously was drained approximately 5yrs ago- suspected due to microperforation after PPM placement at that time  · Echocardiogram ordered:  EF 55% rate ventricular cavity severely dilated  Left atrium moderately dilated, right atrium severely dilated  Inferior vena cava dilated    No pericardial effusion

## 2022-05-12 NOTE — ASSESSMENT & PLAN NOTE
· Creatinine was 1 95 as of 3/10/2022, prior to this in 2014 baseline was approximately 1 3-1 5  · Suspect due to volume overload versus advancement of underlying kidney disease  · 2 54 at time of admission  · Creatinine continues to improve in setting of IV diuresis, 1 86  · Trend BMP daily

## 2022-05-12 NOTE — PROGRESS NOTES
[place]     Progress Note Vanessa Meier 2/28/1923, 80 y o  female MRN: [id number]  Unit/Bed#: -01 Encounter: [id number]  Primary Care Provider: No primary care provider on file  Date and time admitted to hospital: 5/7/2022 12:51 PM    * Acute on chronic combined systolic and diastolic CHF (congestive heart failure) (HCC)  Assessment & Plan  Wt Readings from Last 3 Encounters:   05/12/22 53 1 kg (117 lb 1 oz)   03/25/22 58 5 kg (129 lb)   03/10/22 53 6 kg (118 lb 2 7 oz)     · Patient has been having medications adjusted outpatient due to ongoing fluid retention in lower extremeties  · Weight as of 3/10/22: 118lbs  Patient reports her weight had gone up to 130lbs  · Encourage standing weights as opposed to bed scale - weight downtrending since admit  · On torsemide 40mg BID with 2 5mg PRN metolazone as outpatient  · Currently receiving 3 mg IV Bumex BID  · Daily weights  · Cardiology consulted - appreciate recommendations  Continue IV diuresis  Creatinine continues to improve  Hopeful transition to oral diuretic tomorrow    CAMPBELL (acute kidney injury) ([facility] [address] )  Assessment & Plan  · Creatinine was 1 95 as of 3/10/2022, prior to this in 2014 baseline was approximately 1 3-1 5  · Suspect due to volume overload versus advancement of underlying kidney disease  · 2 54 at time of admission  · Creatinine continues to improve in setting of IV diuresis, 1 86  · Trend BMP daily    Pericardial effusion  Assessment & Plan  · Noted on CXR  · Previously was drained approximately 5yrs ago- suspected due to microperforation after PPM placement at that time  · Echocardiogram ordered:  EF 55% rate ventricular cavity severely dilated  Left atrium moderately dilated, right atrium severely dilated  Inferior vena cava dilated    No pericardial effusion    Permanent atrial fibrillation (HCC)  Assessment & Plan  · Bi-V dual PPM Last interrogation 3/2022- functioning normally  · Managed on coumadin 2 5mg daily, 5mg   · Coumadin resumed   · Trend INR daily - subtherapeutic in setting of recent vitamin K administration and holding of Coumadin    Fall  Assessment & Plan  · Mechanical fall due to loose shoes  · Typically ambulates with a walker  · PT/OT ordered - initially recommended home with VNA services, now recommending rehab to which patient is agreeable  · CT head negative for acute intracranial abnormalities  · Evaluated and cleared by trauma team for admission at [address] [place] [address] Road  · Recommended initially holding Coumadin, administered vitamin K IV given large hematoma  · Hb remains stable from admission  · Coumadin resumed     VTE Pharmacologic Prophylaxis: VTE Score: 5 High Risk (Score >/= 5) - Pharmacological DVT Prophylaxis Ordered: warfarin (Coumadin)  Sequential Compression Devices Ordered  Patient Centered Rounds: I performed bedside rounds with nursing staff today  Discussions with Specialists or Other Care Team Provider: CM, cardiology    Education and Discussions with Family / Patient: Patient declined call to   Time Spent for Care: 30 minutes  More than 50% of total time spent on counseling and coordination of care as described above  Current Length of Stay: 5 day(s)  Current Patient Status: Inpatient   Certification Statement: The patient will continue to require additional inpatient hospital stay due to IV diuresis, ongoing cardiology recommendation  Discharge Plan: Anticipate discharge tomorrow to rehab facility  Code Status: Level 1 - Full Code    Subjective:   Patient denies any complaints  States that she is motivated to get to rehab to work on her strength  Denies chest pain/palpitations, shortness of breath is improving  No nausea/vomiting, abdominal pain      Objective:     Vitals:   Temp (24hrs), Av 7 °F (36 5 °C), Min:97 6 °F (36 4 °C), Max:97 8 °F (36 6 °C)    Temp:  [97 6 °F (36 4 °C)-97 8 °F (36 6 °C)] 97 6 °F (36 4 °C)  HR:  [64-71] 64  Resp: [20] 20  BP: (128-138)/(62-70) 128/62  SpO2:  [92 %-99 %] 99 %  Body mass index is 20 74 kg/m²  Input and Output Summary (last 24 hours): Intake/Output Summary (Last 24 hours) at 5/12/2022 0931  Last data filed at 5/12/2022 0001  Gross per 24 hour   Intake 400 ml   Output 1958 ml   Net -1558 ml       Physical Exam:   Physical Exam  Vitals and nursing note reviewed  Constitutional:       Appearance: Normal appearance  Comments: Appears comfortable, no acute distress   HENT:      Head: Normocephalic  Eyes:      General: No scleral icterus  Extraocular Movements: Extraocular movements intact  Conjunctiva/sclera: Conjunctivae normal    Cardiovascular:      Rate and Rhythm: Normal rate  Rhythm irregularly irregular  Pulmonary:      Effort: Pulmonary effort is normal       Breath sounds: Normal breath sounds  No wheezing, rhonchi or rales  Abdominal:      General: Bowel sounds are normal       Palpations: Abdomen is soft  Tenderness: There is no abdominal tenderness  There is no guarding or rebound  Musculoskeletal:         General: No swelling, tenderness or deformity  Cervical back: Normal range of motion  Comments: Able to move upper/extremities bilaterally, trace to 1+ pitting LE edema   Skin:     General: Skin is warm and dry  Neurological:      Mental Status: She is alert and oriented to person, place, and time     Psychiatric:         Mood and Affect: Mood normal          Speech: Speech normal          Behavior: Behavior normal           Additional Data:     Labs:  Results from last 7 days   Lab Units 05/12/22  0435   WBC Thousand/uL 3 37*   HEMOGLOBIN g/dL 9 1*   HEMATOCRIT % 28 2*   PLATELETS Thousands/uL 206   NEUTROS PCT % 66   LYMPHS PCT % 21   MONOS PCT % 11   EOS PCT % 1     Results from last 7 days   Lab Units 05/12/22  0435   SODIUM mmol/L 144   POTASSIUM mmol/L 3 8   CHLORIDE mmol/L 107   CO2 mmol/L 26   BUN mg/dL 41*   CREATININE mg/dL 1 86*   ANION GAP mmol/L 11   CALCIUM mg/dL 8 3   GLUCOSE RANDOM mg/dL 106     Results from last 7 days   Lab Units 05/12/22  0435   INR  1 34*                   Lines/Drains:  Invasive Devices  Report    Peripheral Intravenous Line  Duration           Peripheral IV 05/11/22 Distal;Left;Upper;Ventral (anterior) Arm <1 day          Drain  Duration           External Urinary Catheter 2 days                      Imaging: No pertinent imaging reviewed  Recent Cultures (last 7 days):         Last 24 Hours Medication List:   Current Facility-Administered Medications   Medication Dose Route Frequency Provider Last Rate    acetaminophen  650 mg Oral Q6H PRN Kaelyn Irizarry V, EDER      aluminum-magnesium hydroxide-simethicone  30 mL Oral Q6H PRN Kaelyn Irizarry V, EDER      bumetanide  3 mg Intravenous BID Ernesto Killian MD      vitamin B-12  250 mcg Oral Daily [address] [facility] V, EDER      ferrous sulfate  325 mg Oral Daily With Breakfast Kaelyn Irizarry V, EDER      fluticasone  1 spray Nasal Daily Derek Quezada PA-C      glycerin-hypromellose-  2 drop [facility]hthalmic Q3H  [facility] V, EDER      metoprolol succinate  25 mg Oral Daily Ernesto Killian MD      ondansetron  4 mg Intravenous Q6H PRN Kaelyn Irizarry V, EDER      pantoprazole  40 mg Oral Daily [address] V, EDER      polyethylene glycol  17 g Oral Daily PRN Edd Reyna PA-C      potassium chloride  20 mEq Oral Daily [address] V, EDER      saccharomyces boulardii  250 mg Oral BID Kaelyn Irizarry V, EDER      warfarin  5 mg Oral Once (warfarin) Edd Reyna PA-C          Today, Patient Was Seen By: Edd Reyna PA-C    **Please Note: This note may have been constructed using a voice recognition system  **

## 2022-05-12 NOTE — ASSESSMENT & PLAN NOTE
Wt Readings from Last 3 Encounters:   05/12/22 53 1 kg (117 lb 1 oz)   03/25/22 58 5 kg (129 lb)   03/10/22 53 6 kg (118 lb 2 7 oz)     · Patient has been having medications adjusted outpatient due to ongoing fluid retention in lower extremeties  · Weight as of 3/10/22: 118lbs  Patient reports her weight had gone up to 130lbs  · Encourage standing weights as opposed to bed scale - weight downtrending since admit  · On torsemide 40mg BID with 2 5mg PRN metolazone as outpatient  · Currently receiving 3 mg IV Bumex BID  · Daily weights  · Cardiology consulted - appreciate recommendations  Continue IV diuresis  Creatinine continues to improve    Hopeful transition to oral diuretic tomorrow

## 2022-05-12 NOTE — DISCHARGE SUMMARY
[facility]   [facility]- Erik Lewis 2/28/1923, 80 y o  female MRN: [id number]  Unit/Bed#: -01 Encounter: [id number]  Primary Care Provider: No primary care provider on file  Date and time admitted to hospital: 5/7/2022 12:51 PM    * Acute on chronic combined systolic and diastolic CHF (congestive heart failure) (HCC)  Assessment & Plan  Wt Readings from Last 3 Encounters:   05/16/22 47 6 kg (105 lb)   03/25/22 58 5 kg (129 lb)   03/10/22 53 6 kg (118 lb 2 7 oz)     · Patient has been having medications adjusted outpatient due to ongoing fluid retention in lower extremeties  · Weight as of 3/10/22 was 118lbs  Patient reports her weight had gone up to 130lbs  · Home diuretic regimen: torsemide 40mg BID with 2 5mg PRN metolazone  · Echocardiogram: LVEF 55%  Systolic function is normal   Wall motion is normal  Unable to assess diastolic function due to atrial fibrillation  Left atrium moderately dilated, right atrium severely dilated  Inferior vena cava dilated  No pericardial effusion  · Appreciate cardiology consult and recommendations   · Status post IV diuresis with Bumex, transitioned to PO torsemide 40 mg BID 5/13  · Stable at 105lbs  · Monitor daily weights, intake and output   · Cardiac diet with salt and fluid restriction     Permanent atrial fibrillation (HCC)  Assessment & Plan  · Status post Bi-V dual PPM placement;  Last interrogation 3/2022- functioning normally  · [facility] with coumadin 2 5 mg daily, 5 mg Mondays  · Trend INR daily - was subtherapeutic in setting of recent vitamin K administration and holding of Coumadin  · 2 42 today, continue home dose    COVID-19 virus infection  Assessment & Plan  Patient was stable for discharge to rehab on 05/13 however subsequently tested positive for COVID  · Stable on room air and asymptomatic at this time  · No indication for COVID directed therapies  · Contact/airborne precautions    Hematoma of left lower extremity  Assessment & Plan  In the setting of mechanical fall with coumadin use   · CT LLE: Large lateral subcutaneous hematoma  · Evaluated by trauma, appreciate inputs   · Recommended initially holding Coumadin, administered vitamin K IV given large hematoma  · Hemoglobin has remained stable and coumadin resumed on 5/9   · Sutures on knee can likely be removed 5/17, pending further healing    Bladder mass  Assessment & Plan  Incidential finding on CT LLE and confirmed with bladder US: Polypoid echogenic mass within the bladder along the posterior wall measuring 2 1 x 1 6 x 2 6 cm  · Referral to Urology outpatient     CAMPBELL (acute kidney injury) ([facility] [address] )  Assessment & Plan  · POA with creatinine of 2 54  Creatinine was 1 95 as of 3/10/2022, prior to this in 2014 baseline was approximately 1 3-1 5  · Suspect due to volume overload coupled with advancement of underlying kidney disease  · Status post IV diuresis as per cardiology   · Avoid nephrotoxins and hypotension   · Creatinine has remained relatively stable, monitor BMP as needed  · Referral to Nephrology as outpatient for further management    Fall  Assessment & Plan  · Mechanical fall due to loose shoes, typically ambulates with a walker  · PT/OT recommending return to previous living environment with VNA services  · CT head negative for acute intracranial abnormalities  · Evaluated and cleared by trauma team for admission at [address] [place] [address] Problems             Resolved Problems  Date Reviewed: 5/15/2022   None               Discharging Physician / Practitioner: Adelina Caballero PA-C  PCP: No primary care provider on file  Admission Date:   Admission Orders (From admission, onward)     Ordered        05/07/22 1443  Inpatient Admission  Once                      Discharge Date: 05/16/22    Consultations During Hospital Stay:  · Cardiology  · PT/OT  · Case management    Procedures Performed:   · None    Significant Findings / Test Results:   · CXR:  Clear lungs  Cardiomegaly  · XR pelvis:  No acute osseous abnormality  · XR knee left:  No acute osseous abnormality  · CT head:  Stable moderate to advanced 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  No signs of acute traumatic injury  · CT lower extremity without contrast left:  No evidence for fracture identified  Soft tissue infiltration of subcutaneous fat over the left gluteal region likely representing soft tissue contusion  · CT lower extremity left with contrast:  No fracture  Large lateral subcutaneous hematoma  · US kidney and bladder: Polypoid echogenic bladder mass concerning for neoplasm, correlating with prior CT finding  Left kidney was unable to be visualized   Limited evaluation of the right kidney shows no hydronephrosis  · Echocardiogram:  EF 55%  Right ventricular cavity severely dilated  Left atrium moderately dilated, right atrium severely dilated  Add no pericardial effusion  Severe tricuspid valve regurgitation  Mild to moderate mitral valve regurgitation  Likely moderate aortic stenosis  Incidental Findings:   · As above     Test Results Pending at Discharge (will require follow up): · None     Outpatient Tests Requested:  · Repeat BMP in 1 week  · Follow up with Urology for bladder mass- further imaging    Complications:  None    Reason for Admission:  Acute on chronic congestive heart failure    Hospital Course:   Carlita Arce is a 80 y o  female patient who originally presented to the hospital on 5/7/2022 due to leg swelling, fall  Past medical history significant for atrial fibrillation, congestive heart failure, chronic kidney disease  Patient presented to the emergency department due to a fall, shortness of breath, worsening extremity edema  Patient was noted to be hypervolemic and started on IV diuresis with Bumex  Cardiology was consulted  Echocardiogram was completed showing EF 55% and high filling pressures  No pericardial effusion    Patient's creatinine continued to improve with IV diuresis  PT/OT recommended patient return to assisted living facility with VNA services given improvement after diuresis     Patient had been found to have large hematoma on admission, trauma had been contacted and patient's Coumadin was reversed with vitamin K  Hemoglobin remains stable and therefore Coumadin was resumed in setting of chronic atrial fibrillation  Patient was transitioned to oral diuretic on 05/13 consisting of torsemide 40 mg b i d   Patient incidentally also was noted to have possible bladder mass noted on CT lower extremity, ultrasound kidney and bladder results outlined above  Ambulatory referral to Urology at time of discharge  On day of discharge patient was hemodynamically stable and verbalized understanding for requested outpatient follow-up  Please see above list of diagnoses and related plan for additional information  Condition at Discharge: stable    Discharge Day Visit / Exam:   Subjective:  Patient reports she continues to feel well and is elated that she can return to [facility]: Blood Pressure: 124/64 (05/16/22 0825)  Pulse: 63 (05/16/22 0825)  Temperature: 97 6 °F (36 4 °C) (05/16/22 0825)  Temp Source: Oral (05/14/22 2100)  Respirations: 18 (05/16/22 0825)  Height: 5' 3" (160 cm) (05/09/22 1145)  Weight - Scale: 47 6 kg (105 lb) (05/16/22 0547)  SpO2: 95 % (05/16/22 0845)    Exam:    Physical Exam  Constitutional:       General: She is not in acute distress  Appearance: Normal appearance  She is well-developed  HENT:      Head: Normocephalic and atraumatic  Cardiovascular:      Rate and Rhythm: Rhythm irregularly irregular  Heart sounds: Murmur heard  Pulmonary:      Breath sounds: No wheezing, rhonchi or rales  Abdominal:      General: There is no distension  Palpations: Abdomen is soft  Tenderness: There is no abdominal tenderness  Musculoskeletal:      Right lower leg: No edema        Left lower leg: No edema  Skin:     Findings: Bruising (over L knee) and laceration (over L knee with sutures in place) present  Neurological:      Mental Status: She is alert and oriented to person, place, and time  Psychiatric:         Attention and Perception: Attention normal          Behavior: Behavior is cooperative  Discussion with Family: Attempted to update  (son) via phone  Left voicemail  Discharge instructions/Information to patient and family:   See after visit summary for information provided to patient and family  Provisions for Follow-Up Care:  See after visit summary for information related to follow-up care and any pertinent home health orders  Disposition:   Other [facility] Readmission: no     Discharge Statement:  I spent 65 minutes discharging the patient  This time was spent on the day of discharge  I had direct contact with the patient on the day of discharge  Greater than 50% of the total time was spent examining patient, answering all patient questions, arranging and discussing plan of care with patient as well as directly providing post-discharge instructions  Additional time then spent on discharge activities  Discharge Medications:  See after visit summary for reconciled discharge medications provided to patient and/or family        **Please Note: This note may have been constructed using a voice recognition system**

## 2022-05-12 NOTE — PROGRESS NOTES
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:  Currently on IV Bumex 3 mg BID and will continue today as her creat continues to improve and she continues with some bibasilar rales; likely transition to PO diuretics tomorrow  · Echocardiogram 5/9:  EF 55% with no WMA, moderately reduced RV function, severe RA dilatation, moderate LA dilatation, moderate AS, mild-moderate MR, severe TR  · Home diuretic regimen: Torsemide 40 mg BID with as needed metolazone 2 5 mg  · Dry weight:  118 lb  · Weight on admission:  125 lb per bed scale  · Weight today:  117 down form 118 lb per standing scale yesterday  · I&Os:  Output 24 hours -1 9 L; net negative 1 5 L in 24 hrs  2  Permanent atrial fibrillation:  Maintained on Toprol-XL 25 mg daily  Anticoagulated on Coumadin with INR 1 3 today  3  Bi V PPM: BSC DC PPM (not MRI 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%  no high rate episodes, chronic AFib, normal device function on interrogation 03/23/2022  4  CKD:  Unclear baseline was 1 95 03/2022  Creatinine 2 54 POA improved to 1 8 today down from 2 0 yesterday     Plan/Recommendations:  · Currently on IV Bumex 3 mg BID and will continue today as her creat continues to improve and she continues with some bibasilar rales; likely transition to PO diuretics tomorrow  · Monitor I&Os, renal function, electrolytes and standing weight  · Maintain potassium >4 and magnesium >2  · Continue beta-blocker  · Continue Coumadin for anticoagulation with goal INR 2-3        _________________________________________________________________    Subjective  Patient notes fatigue but otherwise offers no acute complaints this morning  Review of Systems   Constitutional: Positive for malaise/fatigue  Negative for chills and fever  HENT: Negative for congestion      Cardiovascular: Positive for dyspnea on exertion and leg swelling  Negative for chest pain, orthopnea and palpitations  Respiratory: Negative for cough, shortness of breath (no SOB at rest) and wheezing  Endocrine: Negative  Hematologic/Lymphatic: Negative  Skin: Negative  Musculoskeletal: Negative  Gastrointestinal: Negative for bloating, abdominal pain, nausea and vomiting  Genitourinary: Negative  Neurological: Negative for dizziness and light-headedness  Psychiatric/Behavioral: Negative  All other systems reviewed and are negative  Objective:   Vitals: Blood pressure 138/70, pulse 71, temperature 97 8 °F (36 6 °C), temperature source Oral, resp  rate 19, height 5' 3" (1 6 m), weight 53 1 kg (117 lb 1 oz), SpO2 92 %  ,     Wt Readings from Last 3 Encounters:   05/12/22 53 1 kg (117 lb 1 oz)   03/25/22 58 5 kg (129 lb)   03/10/22 53 6 kg (118 lb 2 7 oz)        Lab Results   Component Value Date    CREATININE 1 86 (H) 05/12/2022    CREATININE 2 03 (H) 05/11/2022    CREATININE 2 04 (H) 05/10/2022         Body mass index is 20 74 kg/m²  ,     Systolic (74FMD), KGF:308 , Min:138 , QXD:773     Diastolic (57REU), XBO:20, Min:70, Max:70          Intake/Output Summary (Last 24 hours) at 5/12/2022 0821  Last data filed at 5/12/2022 0001  Gross per 24 hour   Intake 400 ml   Output 1958 ml   Net -1558 ml     Weight (last 2 days)     Date/Time Weight    05/12/22 0600 53 1 (117 06)    05/11/22 0600 53 9 (118 83)    05/10/22 0600 54 7 (120 6)            Telemetry Review: Not on tele        Physical Exam  Vitals reviewed  Constitutional:       General: She is not in acute distress  HENT:      Head: Normocephalic and atraumatic  Mouth/Throat:      Mouth: Mucous membranes are moist    Cardiovascular:      Rate and Rhythm: Normal rate  Rhythm regularly irregular  Heart sounds: S1 normal and S2 normal  Murmur heard        Comments: Trace to +1 B/L LE edema  Pulmonary:      Effort: Pulmonary effort is normal  No respiratory distress  Breath sounds: Examination of the right-lower field reveals rales  Rales present  Abdominal:      General: Bowel sounds are normal  There is no distension  Palpations: Abdomen is soft  Musculoskeletal:         General: Normal range of motion  Cervical back: Normal range of motion and neck supple  Right lower leg: Edema present  Left lower leg: Edema present  Skin:     General: Skin is warm and dry  Neurological:      Mental Status: She is alert and oriented to person, place, and time     Psychiatric:         Mood and Affect: Mood normal          LABORATORY RESULTS      CBC with diff:   Results from last 7 days   Lab Units 05/12/22  0435 05/11/22  0339 05/10/22  0641 05/09/22  1639 05/09/22  0257 05/08/22  1746 05/08/22  0503 05/07/22  1309   WBC Thousand/uL 3 37* 2 80* 3 45*  --  3 80*  --  3 96* 4 15*   HEMOGLOBIN g/dL 9 1* 8 5* 9 1* 8 8* 8 5* 8 8* 8 4* 8 6*   HEMATOCRIT % 28 2* 26 6* 27 7* 28 1* 26 3* 27 7* 26 7* 27 5*   MCV fL 114* 114* 114*  --  114*  --  116* 117*   PLATELETS Thousands/uL 206 174 155  --  147*  --  152 157   MCH pg 36 8* 36 3* 37 4*  --  37 0*  --  36 4* 36 4*   MCHC g/dL 32 3 32 0 32 9  --  32 3  --  31 5 31 3*   RDW % 16 8* 16 8* 16 8*  --  16 7*  --  16 8* 17 1*   MPV fL 10 8 11 2 11 1  --  10 9  --  11 2 10 3   NRBC AUTO /100 WBCs 1 0 0  --  1  --  1 1       CMP:  Results from last 7 days   Lab Units 05/12/22  0435 05/11/22  0339 05/10/22  0641 05/09/22  0257 05/08/22  0503 05/07/22  1309   POTASSIUM mmol/L 3 8 3 7 3 8 3 9 4 1 5 0   CHLORIDE mmol/L 107 107 107 105 105 104   CO2 mmol/L 26 27 24 24 24 23   BUN mg/dL 41* 46* 50* 54* 53* 55*   CREATININE mg/dL 1 86* 2 03* 2 04* 2 18* 2 38* 2 54*   CALCIUM mg/dL 8 3 7 9* 8 0* 7 5* 7 8* 8 0*   EGFR ml/min/1 73sq m 22 19 19 18 16 15       BMP:  Results from last 7 days   Lab Units 05/12/22  0435 05/11/22  0339 05/10/22  0641 05/09/22  0257 05/08/22  0503 05/07/22  1309   POTASSIUM mmol/L 3 8 3 7 3 8 3 9 4 1 5 0   CHLORIDE mmol/L 107 107 107 105 105 104   CO2 mmol/L 26 27 24 24 24 23   BUN mg/dL 41* 46* 50* 54* 53* 55*   CREATININE mg/dL 1 86* 2 03* 2 04* 2 18* 2 38* 2 54*   CALCIUM mg/dL 8 3 7 9* 8 0* 7 5* 7 8* 8 0*       No results found for: NTBNP                            Results from last 7 days   Lab Units 05/12/22  0435 05/11/22  0339 05/10/22  0641 05/07/22  1309   INR  1 34* 1 26* 1 16 1 87*       Lipid Profile:   Lab Results   Component Value Date    CHOL 192 11/15/2013     Lab Results   Component Value Date    HDL 70 11/15/2013     Lab Results   Component Value Date    LDLCALC 108 11/15/2013     Lab Results   Component Value Date    TRIG 69 11/15/2013       Cardiac testing:   No results found for this or any previous visit  No results found for this or any previous visit  No results found for this or any previous visit  No valid procedures specified  No results found for this or any previous visit          Meds/Allergies   current meds:   Current Facility-Administered Medications   Medication Dose Route Frequency    acetaminophen (TYLENOL) tablet 650 mg  650 mg Oral Q6H PRN    aluminum-magnesium hydroxide-simethicone (MYLANTA) oral suspension 30 mL  30 mL Oral Q6H PRN    bumetanide (BUMEX) injection 3 mg  3 mg Intravenous BID    cyanocobalamin (VITAMIN B-12) tablet 250 mcg  250 mcg Oral Daily    ferrous sulfate tablet 325 mg  325 mg Oral Daily With Breakfast    fluticasone (FLONASE) 50 mcg/act nasal spray 1 spray  1 spray Nasal Daily    glycerin-hypromellose- (ARTIFICIAL TEARS) ophthalmic solution 2 drop  2 drop Ophthalmic Q3H PRN    metoprolol succinate (TOPROL-XL) 24 hr tablet 25 mg  25 mg Oral Daily    ondansetron (ZOFRAN) injection 4 mg  4 mg Intravenous Q6H PRN    pantoprazole (PROTONIX) EC tablet 40 mg  40 mg Oral Daily    polyethylene glycol (MIRALAX) packet 17 g  17 g Oral Daily PRN    potassium chloride (K-DUR,KLOR-CON) CR tablet 20 mEq  20 mEq Oral Daily    saccharomyces boulardii (FLORASTOR) capsule 250 mg  250 mg Oral BID    warfarin (COUMADIN) tablet 5 mg  5 mg Oral Once (warfarin)     Medications Prior to Admission   Medication    acetaminophen (TYLENOL) 325 mg tablet    clotrimazole (LOTRIMIN) 1 % cream    cyanocobalamin (VITAMIN B-12) 500 mcg tablet    Dextran 70-Hypromellose (ARTIFICIAL TEARS) 0 1-0 3 % SOLN    Emollient (CERAVE) CREA    ferrous sulfate 325 (65 Fe) mg tablet    fluticasone (FLONASE) 50 mcg/act nasal spray    guaiFENesin (ROBITUSSIN) 100 MG/5ML oral liquid    loperamide (IMODIUM) 2 mg capsule    metolazone (ZAROXOLYN) 2 5 mg tablet    metoprolol succinate (TOPROL-XL) 50 mg 24 hr tablet    Aiequ-Udldy-Lphsfbp-Pramoxine (TRIPLE ANTIBIOTIC PLUS) 1 % OINT    pantoprazole (PROTONIX) 40 mg tablet    polyethylene glycol (MiraLax) 17 GM/SCOOP powder    potassium chloride (K-DUR,KLOR-CON) 20 mEq tablet    saccharomyces boulardii (Florastor) 250 mg capsule    Saline 0 65 % SOLN    Skin Protectants, Misc  (CALAZIME SKIN PROTECTANT) PSTE    Tetrahydroz-Dextran-PEG-Povid (EYE DROPS ADVANCED RELIEF OP)    Torsemide 40 MG TABS    warfarin (COUMADIN) 2 5 mg tablet    warfarin (COUMADIN) 5 mg tablet            Counseling / Coordination of Care  Total floor / unit time spent today 20 minutes  Greater than 50% of total time was spent with the patient and / or family counseling and / or coordination of care  ** Please Note: Dragon 360 Dictation voice to text software may have been used in the creation of this document   **

## 2022-05-12 NOTE — CASE MANAGEMENT
Case Management Progress Note    Patient name Dave Vázquez  Location [facility] 227/-12 MRN [id number]  : 1923 Date 2022       LOS (days): 5  Geometric Mean LOS (GMLOS) (days): 3 80  Days to GMLOS:-1 2        OBJECTIVE:        Current admission status: Inpatient  Preferred Pharmacy:   [facility] - [address] [address]  Phone: [phone] Fax: [phone]    Primary Care Provider: No primary care provider on file  Primary Insurance: MEDICARE  Secondary Insurance:  FOR LIFE    PROGRESS NOTE:  Spoke with Arnoldo Daugherty at [facility] and bed remains available for patient  Patient for tentative discharge tomorrow  Met with patient and she is agreeable to rehab at [facility] before returning to [facility]  Called and spoke with daughter Baylee Mojica and she is agreeable to above discharge plan also  Will follow and arrange transport

## 2022-05-12 NOTE — ASSESSMENT & PLAN NOTE
· Mechanical fall due to loose shoes  · Typically ambulates with a walker  · PT/OT ordered - initially recommended home with VNA services, now recommending rehab to which patient is agreeable  · CT head negative for acute intracranial abnormalities  · Evaluated and cleared by trauma team for admission at [address]  · Recommended initially holding Coumadin, administered vitamin K IV given large hematoma  · Hb remains stable from admission  · Coumadin resumed 5/9

## 2022-05-13 PROBLEM — N32.89 BLADDER MASS: Status: ACTIVE | Noted: 2022-05-13

## 2022-05-13 PROBLEM — U07.1 COVID-19 VIRUS INFECTION: Status: ACTIVE | Noted: 2022-05-13

## 2022-05-13 PROBLEM — S80.12XA HEMATOMA OF LEFT LOWER EXTREMITY: Status: ACTIVE | Noted: 2022-05-13

## 2022-05-13 LAB
ANION GAP SERPL CALCULATED.3IONS-SCNC: 11 MMOL/L (ref 4–13)
BASOPHILS # BLD AUTO: 0.01 THOUSANDS/ΜL (ref 0–0.1)
BASOPHILS NFR BLD AUTO: 0 % (ref 0–1)
BUN SERPL-MCNC: 38 MG/DL (ref 5–25)
CALCIUM SERPL-MCNC: 8.2 MG/DL (ref 8.3–10.1)
CHLORIDE SERPL-SCNC: 107 MMOL/L (ref 100–108)
CO2 SERPL-SCNC: 27 MMOL/L (ref 21–32)
CREAT SERPL-MCNC: 1.8 MG/DL (ref 0.6–1.3)
EOSINOPHIL # BLD AUTO: 0.02 THOUSAND/ΜL (ref 0–0.61)
EOSINOPHIL NFR BLD AUTO: 1 % (ref 0–6)
ERYTHROCYTE [DISTWIDTH] IN BLOOD BY AUTOMATED COUNT: 16.7 % (ref 11.6–15.1)
FLUAV RNA RESP QL NAA+PROBE: NEGATIVE
FLUBV RNA RESP QL NAA+PROBE: NEGATIVE
GFR SERPL CREATININE-BSD FRML MDRD: 22 ML/MIN/1.73SQ M
GLUCOSE SERPL-MCNC: 97 MG/DL (ref 65–140)
HCT VFR BLD AUTO: 27.6 % (ref 34.8–46.1)
HGB BLD-MCNC: 8.9 G/DL (ref 11.5–15.4)
IMM GRANULOCYTES # BLD AUTO: 0.02 THOUSAND/UL (ref 0–0.2)
IMM GRANULOCYTES NFR BLD AUTO: 1 % (ref 0–2)
INR PPP: 1.71 (ref 0.84–1.19)
LYMPHOCYTES # BLD AUTO: 0.63 THOUSANDS/ΜL (ref 0.6–4.47)
LYMPHOCYTES NFR BLD AUTO: 15 % (ref 14–44)
MCH RBC QN AUTO: 36.8 PG (ref 26.8–34.3)
MCHC RBC AUTO-ENTMCNC: 32.2 G/DL (ref 31.4–37.4)
MCV RBC AUTO: 114 FL (ref 82–98)
MONOCYTES # BLD AUTO: 0.37 THOUSAND/ΜL (ref 0.17–1.22)
MONOCYTES NFR BLD AUTO: 9 % (ref 4–12)
NEUTROPHILS # BLD AUTO: 3.25 THOUSANDS/ΜL (ref 1.85–7.62)
NEUTS SEG NFR BLD AUTO: 74 % (ref 43–75)
NRBC BLD AUTO-RTO: 1 /100 WBCS
PLATELET # BLD AUTO: 210 THOUSANDS/UL (ref 149–390)
PMV BLD AUTO: 10.8 FL (ref 8.9–12.7)
POTASSIUM SERPL-SCNC: 3.6 MMOL/L (ref 3.5–5.3)
PROTHROMBIN TIME: 19.8 SECONDS (ref 11.6–14.5)
RBC # BLD AUTO: 2.42 MILLION/UL (ref 3.81–5.12)
RSV RNA RESP QL NAA+PROBE: NEGATIVE
SARS-COV-2 RNA RESP QL NAA+PROBE: POSITIVE
SODIUM SERPL-SCNC: 145 MMOL/L (ref 136–145)
WBC # BLD AUTO: 4.3 THOUSAND/UL (ref 4.31–10.16)

## 2022-05-13 PROCEDURE — 85610 PROTHROMBIN TIME: CPT | Performed by: PHYSICIAN ASSISTANT

## 2022-05-13 PROCEDURE — 85025 COMPLETE CBC W/AUTO DIFF WBC: CPT | Performed by: PHYSICIAN ASSISTANT

## 2022-05-13 PROCEDURE — 99232 SBSQ HOSP IP/OBS MODERATE 35: CPT | Performed by: INTERNAL MEDICINE

## 2022-05-13 PROCEDURE — 80048 BASIC METABOLIC PNL TOTAL CA: CPT | Performed by: PHYSICIAN ASSISTANT

## 2022-05-13 PROCEDURE — 0241U HB NFCT DS VIR RESP RNA 4 TRGT: CPT | Performed by: PHYSICIAN ASSISTANT

## 2022-05-13 PROCEDURE — 99232 SBSQ HOSP IP/OBS MODERATE 35: CPT | Performed by: PHYSICIAN ASSISTANT

## 2022-05-13 RX ORDER — METOPROLOL SUCCINATE 50 MG/1
25 TABLET, EXTENDED RELEASE ORAL DAILY
Refills: 0
Start: 2022-05-13

## 2022-05-13 RX ORDER — TORSEMIDE 20 MG/1
60 TABLET ORAL 2 TIMES DAILY
Status: DISCONTINUED | OUTPATIENT
Start: 2022-05-13 | End: 2022-05-13

## 2022-05-13 RX ORDER — TORSEMIDE 20 MG/1
40 TABLET ORAL 2 TIMES DAILY
Status: DISCONTINUED | OUTPATIENT
Start: 2022-05-13 | End: 2022-05-16 | Stop reason: HOSPADM

## 2022-05-13 RX ADMIN — TORSEMIDE 40 MG: 20 TABLET ORAL at 17:45

## 2022-05-13 RX ADMIN — FLUTICASONE PROPIONATE 1 SPRAY: 50 SPRAY, METERED NASAL at 08:51

## 2022-05-13 RX ADMIN — POTASSIUM CHLORIDE 20 MEQ: 20 TABLET, EXTENDED RELEASE ORAL at 08:49

## 2022-05-13 RX ADMIN — CYANOCOBALAMIN TAB 500 MCG 250 MCG: 500 TAB at 08:49

## 2022-05-13 RX ADMIN — METOPROLOL SUCCINATE 25 MG: 25 TABLET, FILM COATED, EXTENDED RELEASE ORAL at 08:50

## 2022-05-13 RX ADMIN — TORSEMIDE 40 MG: 20 TABLET ORAL at 08:50

## 2022-05-13 RX ADMIN — FERROUS SULFATE TAB 325 MG (65 MG ELEMENTAL FE) 325 MG: 325 (65 FE) TAB at 08:49

## 2022-05-13 RX ADMIN — PANTOPRAZOLE SODIUM 40 MG: 40 TABLET, DELAYED RELEASE ORAL at 08:49

## 2022-05-13 RX ADMIN — Medication 250 MG: at 17:45

## 2022-05-13 RX ADMIN — Medication 250 MG: at 08:49

## 2022-05-13 NOTE — ASSESSMENT & PLAN NOTE
Wt Readings from Last 3 Encounters:   05/13/22 50 5 kg (111 lb 5 3 oz)   03/25/22 58 5 kg (129 lb)   03/10/22 53 6 kg (118 lb 2 7 oz)     · Patient has been having medications adjusted outpatient due to ongoing fluid retention in lower extremeties  · Weight as of 3/10/22 [id number]  Patient reports her weight had gone up to 130lbs  · Home diuretic regimen: torsemide 40mg BID with 2 5mg PRN metolazone  · Echocardiogram: LVEF 55%  Systolic function is normal   Wall motion is normal  Unable to assess diastolic function due to atrial fibrillation  Left atrium moderately dilated, right atrium severely dilated  Inferior vena cava dilated  No pericardial effusion     · Appreciate cardiology consult and recommendations   · Status post IV diuresis with Bumex, transitioned to PO torsemide 40 mg BID today  · Monitor daily weights, intake and output   · Cardiac diet with salt and fluid restriction

## 2022-05-13 NOTE — ASSESSMENT & PLAN NOTE
In the setting of mechanical fall with coumadin use   · CT LLE: Large lateral subcutaneous hematoma     · Evaluated by trauma, appreciate inputs   · Recommended initially holding Coumadin, administered vitamin K IV given large hematoma  · Hemoglobin has remained stable and coumadin resumed on 5/9

## 2022-05-13 NOTE — INCIDENTAL FINDINGS
The following findings require follow up:  Radiographic finding   Finding: Bladder mass    Follow up required: referral to outpatient urology    Follow up should be done within 4-6 week(s)

## 2022-05-13 NOTE — CASE MANAGEMENT
Case Management Progress Note    Patient name Leidy Chisholm  Location [facility] [address] 227/-98 MRN [id number]  : 1923 Date 2022       LOS (days): 6  Geometric Mean LOS (GMLOS) (days): 3 80  Days to GMLOS:-2 2        OBJECTIVE:        Current admission status: Inpatient  Preferred Pharmacy:   [facility] [place] [address]  Phone: [phone] Fax: [phone]    Primary Care Provider: No primary care provider on file  Primary Insurance: MEDICARE  Secondary Insurance:  FOR LIFE    PROGRESS NOTE:  Patient medically cleared for discharge  Transport arranged for 1600   COVID test came back +  Discharge cancelled as [facility] cannot accept COVID + patient - must quarantine for 10 days  Will follow COVID protocol and retest patient tomorrow  Called and spoke with Alexandria at [facility] re: admission back to assisted living  Will reassess for admission there after patient is retested  Transport with [facility] cancelled through [facility] at [facility]

## 2022-05-13 NOTE — ASSESSMENT & PLAN NOTE
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

## 2022-05-13 NOTE — PROGRESS NOTES
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: will transition IV Bumex 3 mg BID to PO torsemide 40 mg BID as of this AM, as per Dr Bindu Jerry     · Echocardiogram 5/9:  EF 55% with no WMA, moderately reduced RV function, severe RA dilatation, moderate LA dilatation, moderate AS, mild-moderate MR, severe TR  · Home diuretic regimen: Torsemide 40 mg BID with as needed metolazone 2 5 mg  · Dry weight:  118 lb  · Weight on admission:  125 lb per bed scale  · Weight today:  111 down form 117 lb per standing scale yesterday (question accuracy)  · I&Os:  Output 24 hours -1 9 L; net negative 1 5 L in 24 hrs  2  Permanent atrial fibrillation:  Maintained on Toprol-XL 25 mg daily  Anticoagulated on Coumadin with INR 1 7 today  3  Bi V PPM: BSC DC PPM (not MRI 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%  no high rate episodes, chronic AFib, normal device function on interrogation 03/23/2022  4  CKD:  Unclear baseline was 1 95 03/2022  Creatinine 2 54 POA improved to 1 8 today which is stable from yesterday     Plan/Recommendations:  · will transition IV Bumex 3 mg BID to PO torsemide 40 mg BID as of this AM, as per Dr Bindu Jerry   · Continue beta-blocker  · Continue Coumadin for anticoagulation with goal INR 2-3  · Our office will contact [address] [place] [address] to schedule follow up appt         _________________________________________________________________    Subjective  Patient notes fatigue this morning but otherwise offers no complaints of shortness of breath at rest   She feels her lower extremity edema is improved         Review of Systems   Constitutional: Positive for malaise/fatigue  Negative for chills and fever  HENT: Negative for congestion  Cardiovascular: Negative for chest pain, dyspnea on exertion, leg swelling, orthopnea and palpitations     Respiratory: Negative for cough, shortness of breath (no SOB at rest) and wheezing  Endocrine: Negative  Hematologic/Lymphatic: Negative  Skin: Negative  Musculoskeletal: Negative  Gastrointestinal: Negative for bloating, abdominal pain, nausea and vomiting  Genitourinary: Negative  Neurological: Negative for dizziness and light-headedness  Psychiatric/Behavioral: Negative  All other systems reviewed and are negative  Objective:   Vitals: Blood pressure 123/58, pulse 67, temperature 98 4 °F (36 9 °C), temperature source Oral, resp  rate 16, height 5' 3" (1 6 m), weight 50 5 kg (111 lb 5 3 oz), SpO2 97 %  ,     Wt Readings from Last 3 Encounters:   22 50 5 kg (111 lb 5 3 oz)   22 58 5 kg (129 lb)   03/10/22 53 6 kg (118 lb 2 7 oz)        Lab Results   Component Value Date    CREATININE 1 80 (H) 2022    CREATININE 1 86 (H) 2022    CREATININE 2 03 (H) 2022         Body mass index is 19 72 kg/m²  ,     Systolic (72ROX), RUDOLPH:083 , Min:123 , SSS:397     Diastolic (90HIU), P, Min:58, Max:85          Intake/Output Summary (Last 24 hours) at 2022 0812  Last data filed at 2022 0033  Gross per 24 hour   Intake --   Output 800 ml   Net -800 ml     Weight (last 2 days)     Date/Time Weight    22 0534 50 5 (111 33)    22 0600 53 1 (117 06)    22 0600 53 9 (118 83)            Telemetry Review: Not on tele        Physical Exam  Vitals reviewed  Constitutional:       General: She is not in acute distress  HENT:      Head: Normocephalic and atraumatic  Mouth/Throat:      Mouth: Mucous membranes are moist    Cardiovascular:      Rate and Rhythm: Normal rate and regular rhythm  Heart sounds: S1 normal and S2 normal  Murmur heard  Comments: Trace edema B/L LE  Pulmonary:      Effort: Pulmonary effort is normal  No respiratory distress  Breath sounds: Normal breath sounds  Abdominal:      General: Bowel sounds are normal  There is no distension  Palpations: Abdomen is soft  Musculoskeletal:         General: Normal range of motion  Cervical back: Normal range of motion and neck supple  Right lower leg: Edema present  Left lower leg: Edema present  Skin:     General: Skin is warm and dry  Neurological:      Mental Status: She is alert and oriented to person, place, and time     Psychiatric:         Mood and Affect: Mood normal          LABORATORY RESULTS      CBC with diff:   Results from last 7 days   Lab Units 05/13/22  0537 05/12/22  0435 05/11/22  0339 05/10/22  0641 05/09/22  1639 05/09/22  0257 05/08/22  1746 05/08/22  0503 05/07/22  1309   WBC Thousand/uL 4 30* 3 37* 2 80* 3 45*  --  3 80*  --  3 96* 4 15*   HEMOGLOBIN g/dL 8 9* 9 1* 8 5* 9 1* 8 8* 8 5* 8 8* 8 4* 8 6*   HEMATOCRIT % 27 6* 28 2* 26 6* 27 7* 28 1* 26 3* 27 7* 26 7* 27 5*   MCV fL 114* 114* 114* 114*  --  114*  --  116* 117*   PLATELETS Thousands/uL 210 206 174 155  --  147*  --  152 157   MCH pg 36 8* 36 8* 36 3* 37 4*  --  37 0*  --  36 4* 36 4*   MCHC g/dL 32 2 32 3 32 0 32 9  --  32 3  --  31 5 31 3*   RDW % 16 7* 16 8* 16 8* 16 8*  --  16 7*  --  16 8* 17 1*   MPV fL 10 8 10 8 11 2 11 1  --  10 9  --  11 2 10 3   NRBC AUTO /100 WBCs 1 1 0 0  --  1  --  1 1       CMP:  Results from last 7 days   Lab Units 05/13/22  0537 05/12/22  0435 05/11/22  0339 05/10/22  0641 05/09/22  0257 05/08/22  0503 05/07/22  1309   POTASSIUM mmol/L 3 6 3 8 3 7 3 8 3 9 4 1 5 0   CHLORIDE mmol/L 107 107 107 107 105 105 104   CO2 mmol/L 27 26 27 24 24 24 23   BUN mg/dL 38* 41* 46* 50* 54* 53* 55*   CREATININE mg/dL 1 80* 1 86* 2 03* 2 04* 2 18* 2 38* 2 54*   CALCIUM mg/dL 8 2* 8 3 7 9* 8 0* 7 5* 7 8* 8 0*   EGFR ml/min/1 73sq m 22 22 19 19 18 16 15       BMP:  Results from last 7 days   Lab Units 05/13/22  0537 05/12/22  0435 05/11/22  0339 05/10/22  0641 05/09/22  0257 05/08/22  0503 05/07/22  1309   POTASSIUM mmol/L 3 6 3 8 3 7 3 8 3 9 4 1 5 0   CHLORIDE mmol/L 107 107 107 107 105 105 104   CO2 mmol/L 27 26 27 24 24 24 23   BUN mg/dL 38* 41* 46* 50* 54* 53* 55*   CREATININE mg/dL 1 80* 1 86* 2 03* 2 04* 2 18* 2 38* 2 54*   CALCIUM mg/dL 8 2* 8 3 7 9* 8 0* 7 5* 7 8* 8 0*       No results found for: NTBNP                            Results from last 7 days   Lab Units 05/13/22  0537 05/12/22  0435 05/11/22  0339 05/10/22  0641 05/07/22  1309   INR  1 71* 1 34* 1 26* 1 16 1 87*       Lipid Profile:   Lab Results   Component Value Date    CHOL 192 11/15/2013     Lab Results   Component Value Date    HDL 70 11/15/2013     Lab Results   Component Value Date    LDLCALC 108 11/15/2013     Lab Results   Component Value Date    TRIG 69 11/15/2013       Cardiac testing:   No results found for this or any previous visit  No results found for this or any previous visit  No results found for this or any previous visit  No valid procedures specified  No results found for this or any previous visit          Meds/Allergies   current meds:   Current Facility-Administered Medications   Medication Dose Route Frequency    acetaminophen (TYLENOL) tablet 650 mg  650 mg Oral Q6H PRN    aluminum-magnesium hydroxide-simethicone (MYLANTA) oral suspension 30 mL  30 mL Oral Q6H PRN    cyanocobalamin (VITAMIN B-12) tablet 250 mcg  250 mcg Oral Daily    ferrous sulfate tablet 325 mg  325 mg Oral Daily With Breakfast    fluticasone (FLONASE) 50 mcg/act nasal spray 1 spray  1 spray Nasal Daily    glycerin-hypromellose- (ARTIFICIAL TEARS) ophthalmic solution 2 drop  2 drop Ophthalmic Q3H PRN    metoprolol succinate (TOPROL-XL) 24 hr tablet 25 mg  25 mg Oral Daily    ondansetron (ZOFRAN) injection 4 mg  4 mg Intravenous Q6H PRN    pantoprazole (PROTONIX) EC tablet 40 mg  40 mg Oral Daily    polyethylene glycol (MIRALAX) packet 17 g  17 g Oral Daily PRN    potassium chloride (K-DUR,KLOR-CON) CR tablet 20 mEq  20 mEq Oral Daily    saccharomyces boulardii (FLORASTOR) capsule 250 mg  250 mg Oral BID    torsemide (DEMADEX) tablet 40 mg  40 mg Oral BID     Medications Prior to Admission   Medication    acetaminophen (TYLENOL) 325 mg tablet    clotrimazole (LOTRIMIN) 1 % cream    cyanocobalamin (VITAMIN B-12) 500 mcg tablet    Dextran 70-Hypromellose (ARTIFICIAL TEARS) 0 1-0 3 % SOLN    Emollient (CERAVE) CREA    ferrous sulfate 325 (65 Fe) mg tablet    fluticasone (FLONASE) 50 mcg/act nasal spray    guaiFENesin (ROBITUSSIN) 100 MG/5ML oral liquid    loperamide (IMODIUM) 2 mg capsule    metolazone (ZAROXOLYN) 2 5 mg tablet    metoprolol succinate (TOPROL-XL) 50 mg 24 hr tablet    Lvpsm-Takwc-Bwidfzf-Pramoxine (TRIPLE ANTIBIOTIC PLUS) 1 % OINT    pantoprazole (PROTONIX) 40 mg tablet    polyethylene glycol (MiraLax) 17 GM/SCOOP powder    potassium chloride (K-DUR,KLOR-CON) 20 mEq tablet    saccharomyces boulardii (Florastor) 250 mg capsule    Saline 0 65 % SOLN    Skin Protectants, Misc  (CALAZIME SKIN PROTECTANT) PSTE    Tetrahydroz-Dextran-PEG-Povid (EYE DROPS ADVANCED RELIEF OP)    Torsemide 40 MG TABS    warfarin (COUMADIN) 2 5 mg tablet    warfarin (COUMADIN) 5 mg tablet            Counseling / Coordination of Care  Total floor / unit time spent today 20 minutes  Greater than 50% of total time was spent with the patient and / or family counseling and / or coordination of care  ** Please Note: Dragon 360 Dictation voice to text software may have been used in the creation of this document   **

## 2022-05-13 NOTE — ASSESSMENT & PLAN NOTE
· POA with creatinine of 2 54   Creatinine was 1 95 as of 3/10/2022, prior to this in 2014 baseline was approximately 1 3-1 5  · Suspect due to volume overload coupled with advancement of underlying kidney disease  · Status post IV diuresis as per cardiology   · Avoid nephrotoxins and hypotension   · Creatinine has remained relatively stable, monitor BMP as needed  · Referral to Nephrology as outpatient for further management

## 2022-05-13 NOTE — ASSESSMENT & PLAN NOTE
· Status post Bi-V dual PPM placement;  Last interrogation 3/2022- functioning normally  · AC with coumadin 2 5 mg daily, 5 mg Mondays  · Trend INR daily - subtherapeutic in setting of recent vitamin K administration and holding of Coumadin

## 2022-05-13 NOTE — ASSESSMENT & PLAN NOTE
Patient was stable for discharge to rehab on 05/13 however subsequently tested positive for COVID  · Stable on room air and asymptomatic at this time  · No indication for COVID directed therapies  · Contact/airborne precautions

## 2022-05-13 NOTE — ASSESSMENT & PLAN NOTE
· Noted on CXR  · Previously was drained approximately 5yrs ago- suspected due to microperforation after PPM placement at that time  · Echocardiogram without pericardial effusion:  LVEF 55%  Left atrium moderately dilated, right atrium severely dilated  Inferior vena cava dilated

## 2022-05-13 NOTE — PROGRESS NOTES
[place]  Progress Note Fide Conway 2/28/1923, 80 y o  female MRN: [id number]  Unit/Bed#: -Yany Encounter: [id number]  Primary Care Provider: No primary care provider on file  Date and time admitted to hospital: 5/7/2022 12:51 PM    * Acute on chronic combined systolic and diastolic CHF (congestive heart failure) ([facility])  Assessment & Plan  Wt Readings from Last 3 Encounters:   05/13/22 50 5 kg (111 lb 5 3 oz)   03/25/22 58 5 kg (129 lb)   03/10/22 53 6 kg (118 lb 2 7 oz)     · Patient has been having medications adjusted outpatient due to ongoing fluid retention in lower extremeties  · Weight as of 3/10/22 [id number]  Patient reports her weight had gone up to 130lbs  · Home diuretic regimen: torsemide 40mg BID with 2 5mg PRN metolazone  · Echocardiogram: LVEF 55%  Systolic function is normal   Wall motion is normal  Unable to assess diastolic function due to atrial fibrillation  Left atrium moderately dilated, right atrium severely dilated  Inferior vena cava dilated  No pericardial effusion  · Appreciate cardiology consult and recommendations   · Status post IV diuresis with Bumex, transitioned to PO torsemide 40 mg BID today  · Monitor daily weights, intake and output   · Cardiac diet with salt and fluid restriction     COVID-19 virus infection  Assessment & Plan  Patient was stable for discharge to rehab on 05/13 however subsequently tested positive for COVID  · Stable on room air and asymptomatic at this time  · No indication for COVID directed therapies  · Contact/airborne precautions    CAMPBELL (acute kidney injury) ([facility] [address] )  Assessment & Plan  · POA with creatinine of 2 54   Creatinine was 1 95 as of 3/10/2022, prior to this in 2014 baseline was approximately 1 3-1 5  · Suspect due to volume overload coupled with advancement of underlying kidney disease  · Status post IV diuresis as per cardiology   · Avoid nephrotoxins and hypotension   · Creatinine has remained relatively stable, monitor BMP as needed  · Referral to Nephrology as outpatient for further management    Fall  Assessment & Plan  · Mechanical fall due to loose shoes, typically ambulates with a walker  · PT/OT initially recommended home with VNA services, now recommending rehab to which patient is agreeable; CM following   · CT head negative for acute intracranial abnormalities  · Evaluated and cleared by trauma team for admission at [facility]    Permanent atrial fibrillation [facility])  Assessment & Plan  · Status post Bi-V dual PPM placement; Last interrogation 3/2022- functioning normally  · AC with coumadin 2 5 mg daily, 5 mg Mondays  · Trend INR daily - subtherapeutic in setting of recent vitamin K administration and holding of Coumadin    Hematoma of left lower extremity  Assessment & Plan  In the setting of mechanical fall with coumadin use   · CT LLE: Large lateral subcutaneous hematoma  · Evaluated by trauma, appreciate inputs   · Recommended initially holding Coumadin, administered vitamin K IV given large hematoma  · Hemoglobin has remained stable and coumadin resumed on 5/9     Bladder mass  Assessment & Plan  Incidential finding on CT LLE and confirmed with bladder US: Polypoid echogenic mass within the bladder along the posterior wall measuring 2 1 x 1 6 x 2 6 cm  · Referral to Urology outpatient     VTE Pharmacologic Prophylaxis: VTE Score: 5 High Risk (Score >/= 5) - Pharmacological DVT Prophylaxis Ordered: warfarin (Coumadin)  Sequential Compression Devices Ordered  Patient Centered Rounds: I performed bedside rounds with nursing staff today  Discussions with Specialists or Other Care Team Provider: primary RN, case management     Education and Discussions with Family / Patient: Attempted to update  (son) via phone  Left voicemail  Time Spent for Care: 15 minutes  More than 50% of total time spent on counseling and coordination of care as described above      Current Length of Stay: 6 day(s)  Current Patient Status: Inpatient   Certification Statement: The patient will continue to require additional inpatient hospital stay due to testing positive for covid, dispo planning   Discharge Plan: patient is medically stable for discharge, pending rehab placement     Code Status: Level 1 - Full Code    Subjective:   Patient seen and examined earlier today  She complained of aching in her legs as well as dry cough however states post at the symptoms are not new or worse than baseline  She otherwise denies chest pain or shortness of breath  Objective:     Vitals:   Temp (24hrs), Av 3 °F (36 8 °C), Min:98 1 °F (36 7 °C), Max:98 4 °F (36 9 °C)    Temp:  [98 1 °F (36 7 °C)-98 4 °F (36 9 °C)] 98 4 °F (36 9 °C)  HR:  [67-68] 67  Resp:  [10-16] 16  BP: (123-166)/(58-85) 123/58  SpO2:  [97 %] 97 %  Body mass index is 19 72 kg/m²  Input and Output Summary (last 24 hours): Intake/Output Summary (Last 24 hours) at 2022 1521  Last data filed at 2022 1301  Gross per 24 hour   Intake 620 ml   Output 1350 ml   Net -730 ml       Physical Exam:   Physical Exam  Vitals and nursing note reviewed  Constitutional:       General: She is not in acute distress  Cardiovascular:      Rate and Rhythm: Normal rate and regular rhythm  Heart sounds: Murmur heard  Pulmonary:      Effort: Pulmonary effort is normal  No respiratory distress  Breath sounds: No wheezing or rales  Skin:     General: Skin is warm and dry  Coloration: Skin is not pale  Findings: No erythema  Neurological:      Mental Status: She is alert and oriented to person, place, and time          Additional Data:     Labs:  Results from last 7 days   Lab Units 22  0537   WBC Thousand/uL 4 30*   HEMOGLOBIN g/dL 8 9*   HEMATOCRIT % 27 6*   PLATELETS Thousands/uL 210   NEUTROS PCT % 74   LYMPHS PCT % 15   MONOS PCT % 9   EOS PCT % 1     Results from last 7 days   Lab Units 22  0537   SODIUM mmol/L 145 POTASSIUM mmol/L 3 6   CHLORIDE mmol/L 107   CO2 mmol/L 27   BUN mg/dL 38*   CREATININE mg/dL 1 80*   ANION GAP mmol/L 11   CALCIUM mg/dL 8 2*   GLUCOSE RANDOM mg/dL 97     Results from last 7 days   Lab Units 05/13/22  0537   INR  1 71*                   Lines/Drains:  Invasive Devices  Report    Peripheral Intravenous Line  Duration           Peripheral IV 05/11/22 Distal;Left;Upper;Ventral (anterior) Arm 1 day          Drain  Duration           External Urinary Catheter 4 days                      Imaging: No pertinent imaging reviewed  Recent Cultures (last 7 days):         Last 24 Hours Medication List:   Current Facility-Administered Medications   Medication Dose Route Frequency Provider Last Rate    acetaminophen  650 mg Oral Q6H PRN Kaelyn DALEY PA-C      aluminum-magnesium hydroxide-simethicone  30 mL Oral Q6H PRN Lana DALEY PA-C      vitamin B-12  250 mcg Oral Daily Kaelyn DALEY PA-C      ferrous sulfate  325 mg Oral Daily With Breakfast Kaelyn DALEY PA-C      fluticasone  1 spray Nasal Daily Susana Willams PA-C      glycerin-hypromellose-  2 drop Ophthalmic Q3H PRN Lana DALEY PA-C      metoprolol succinate  25 mg Oral Daily Monique Cannon MD      ondansetron  4 mg Intravenous Q6H PRN Kaelyn Irizarry V, EDER      pantoprazole  40 mg Oral Daily Lana Dey V, EDER      polyethylene glycol  17 g Oral Daily PRN Amanda Simmons PA-C      potassium chloride  20 mEq Oral Daily Lana DALEY PA-C      saccharomyces boulardii  250 mg Oral BID Kaelyn Irizarry V, EDER      torsemide  40 mg Oral BID DNIA Marte          Today, Patient Was Seen By: Jenise Christine PA-C    **Please Note: This note may have been constructed using a voice recognition system  **

## 2022-05-13 NOTE — ASSESSMENT & PLAN NOTE
· Mechanical fall due to loose shoes, typically ambulates with a walker  · PT/OT initially recommended home with VNA services, now recommending rehab to which patient is agreeable; CM following   · CT head negative for acute intracranial abnormalities  · Evaluated and cleared by trauma team for admission at [address] [place] [address]

## 2022-05-13 NOTE — CASE MANAGEMENT
Case Management Progress Note    Patient name Bg Nearing  Location [facility] [address] [id number] MRN [id number]  : 1923 Date 2022       LOS (days): 6  Geometric Mean LOS (GMLOS) (days): 3 80  Days to GMLOS:-2 2        OBJECTIVE:        Current admission status: Inpatient  Preferred Pharmacy:   [facility] [address] [address]  Phone: [phone] Fax: [phone]    Primary Care Provider: No primary care provider on file  Primary Insurance: MEDICARE  Secondary Insurance:  FOR LIFE    PROGRESS NOTE:  Called and spoke with patients daughter Nimco Morfin re: COVID+ and discharge plan  Discussed that COVID would be repeated tomorrow if negative, would retest again on   If positive, patient would be considered +  Daughter states that if patient is + and [facility] and/or [address] could not accept patient, she would have patient come and stay with her  Will follow

## 2022-05-14 LAB
FLUAV RNA RESP QL NAA+PROBE: NEGATIVE
FLUBV RNA RESP QL NAA+PROBE: NEGATIVE
INR PPP: 2.06 (ref 0.84–1.19)
PROTHROMBIN TIME: 22.7 SECONDS (ref 11.6–14.5)
RSV RNA RESP QL NAA+PROBE: NEGATIVE
SARS-COV-2 RNA RESP QL NAA+PROBE: POSITIVE

## 2022-05-14 PROCEDURE — U0003 INFECTIOUS AGENT DETECTION BY NUCLEIC ACID (DNA OR RNA); SEVERE ACUTE RESPIRATORY SYNDROME CORONAVIRUS 2 (SARS-COV-2) (CORONAVIRUS DISEASE [COVID-19]), AMPLIFIED PROBE TECHNIQUE, MAKING USE OF HIGH THROUGHPUT TECHNOLOGIES AS DESCRIBED BY CMS-2020-01-R: HCPCS | Performed by: PHYSICIAN ASSISTANT

## 2022-05-14 PROCEDURE — U0005 INFEC AGEN DETEC AMPLI PROBE: HCPCS | Performed by: PHYSICIAN ASSISTANT

## 2022-05-14 PROCEDURE — 85610 PROTHROMBIN TIME: CPT | Performed by: PHYSICIAN ASSISTANT

## 2022-05-14 PROCEDURE — 0241U HB NFCT DS VIR RESP RNA 4 TRGT: CPT | Performed by: PHYSICIAN ASSISTANT

## 2022-05-14 PROCEDURE — 99232 SBSQ HOSP IP/OBS MODERATE 35: CPT | Performed by: PHYSICIAN ASSISTANT

## 2022-05-14 RX ORDER — WARFARIN SODIUM 2.5 MG/1
2.5 TABLET ORAL
Status: DISCONTINUED | OUTPATIENT
Start: 2022-05-14 | End: 2022-05-16 | Stop reason: HOSPADM

## 2022-05-14 RX ADMIN — POTASSIUM CHLORIDE 20 MEQ: 20 TABLET, EXTENDED RELEASE ORAL at 09:20

## 2022-05-14 RX ADMIN — FERROUS SULFATE TAB 325 MG (65 MG ELEMENTAL FE) 325 MG: 325 (65 FE) TAB at 09:19

## 2022-05-14 RX ADMIN — TORSEMIDE 40 MG: 20 TABLET ORAL at 09:19

## 2022-05-14 RX ADMIN — FLUTICASONE PROPIONATE 1 SPRAY: 50 SPRAY, METERED NASAL at 09:22

## 2022-05-14 RX ADMIN — TORSEMIDE 40 MG: 20 TABLET ORAL at 18:11

## 2022-05-14 RX ADMIN — METOPROLOL SUCCINATE 25 MG: 25 TABLET, FILM COATED, EXTENDED RELEASE ORAL at 09:20

## 2022-05-14 RX ADMIN — CYANOCOBALAMIN TAB 500 MCG 250 MCG: 500 TAB at 09:27

## 2022-05-14 RX ADMIN — Medication 250 MG: at 18:11

## 2022-05-14 RX ADMIN — WARFARIN SODIUM 2.5 MG: 2.5 TABLET ORAL at 18:11

## 2022-05-14 RX ADMIN — PANTOPRAZOLE SODIUM 40 MG: 40 TABLET, DELAYED RELEASE ORAL at 09:20

## 2022-05-14 RX ADMIN — Medication 250 MG: at 09:20

## 2022-05-14 NOTE — PLAN OF CARE
Problem: MOBILITY - ADULT  Goal: Maintain or return to baseline ADL function  Description: INTERVENTIONS:  -  Assess patient's ability to carry out ADLs; assess patient's baseline for ADL function and identify physical deficits which impact ability to perform ADLs (bathing, care of mouth/teeth, toileting, grooming, dressing, etc )  - Assess/evaluate cause of self-care deficits   - Assess range of motion  - Assess patient's mobility; develop plan if impaired  - Assess patient's need for assistive devices and provide as appropriate  - Encourage maximum independence but intervene and supervise when necessary  - Involve family in performance of ADLs  - Assess for home care needs following discharge   - Consider OT consult to assist with ADL evaluation and planning for discharge  - Provide patient education as appropriate  Outcome: Progressing  Goal: Maintains/Returns to pre admission functional level  Description: INTERVENTIONS:  - Perform BMAT or MOVE assessment daily    - Set and communicate daily mobility goal to care team and patient/family/caregiver  - Collaborate with rehabilitation services on mobility goals if consulted  - Perform Range of Motion 2 times a day  - Reposition patient every 2 hours    - Dangle patient 2 times a day  - Stand patient 2 times a day  - Ambulate patient 2 times a day  - Out of bed to chair 2 times a day   - Out of bed for meals 2 times a day  - Out of bed for toileting  - Record patient progress and toleration of activity level   Outcome: Progressing     Problem: Prexisting or High Potential for Compromised Skin Integrity  Goal: Skin integrity is maintained or improved  Description: INTERVENTIONS:  - Identify patients at risk for skin breakdown  - Assess and monitor skin integrity  - Assess and monitor nutrition and hydration status  - Monitor labs   - Assess for incontinence   - Turn and reposition patient  - Assist with mobility/ambulation  - Relieve pressure over bony prominences  - Avoid friction and shearing  - Provide appropriate hygiene as needed including keeping skin clean and dry  - Evaluate need for skin moisturizer/barrier cream  - Collaborate with interdisciplinary team   - Patient/family teaching  - Consider wound care consult   Outcome: Progressing     Problem: Nutrition/Hydration-ADULT  Goal: Nutrient/Hydration intake appropriate for improving, restoring or maintaining nutritional needs  Description: Monitor and assess patient's nutrition/hydration status for malnutrition  Collaborate with interdisciplinary team and initiate plan and interventions as ordered  Monitor patient's weight and dietary intake as ordered or per policy  Utilize nutrition screening tool and intervene as necessary  Determine patient's food preferences and provide high-protein, high-caloric foods as appropriate       INTERVENTIONS:  - Monitor oral intake, urinary output, labs, and treatment plans  - Assess nutrition and hydration status and recommend course of action  - Evaluate amount of meals eaten  - Assist patient with eating if necessary   - Allow adequate time for meals  - Recommend/ encourage appropriate diets, oral nutritional supplements, and vitamin/mineral supplements  - Order, calculate, and assess calorie counts as needed  - Recommend, monitor, and adjust tube feedings and TPN/PPN based on assessed needs  - Assess need for intravenous fluids  - Provide specific nutrition/hydration education as appropriate  - Include patient/family/caregiver in decisions related to nutrition  Outcome: Progressing     Problem: Potential for Falls  Goal: Patient will remain free of falls  Description: INTERVENTIONS:  - Educate patient/family on patient safety including physical limitations  - Instruct patient to call for assistance with activity   - Consult OT/PT to assist with strengthening/mobility   - Keep Call bell within reach  - Keep bed low and locked with side rails adjusted as appropriate  - Keep care items and personal belongings within reach  - Initiate and maintain comfort rounds  - Make Fall Risk Sign visible to staff  - Offer Toileting every 2 Hours, in advance of need  - Initiate/Maintain bed alarm  - Obtain necessary fall risk management equipment:   - Apply yellow socks and bracelet for high fall risk patients  - Consider moving patient to room near nurses station  Outcome: Progressing     Problem: RESPIRATORY - ADULT  Goal: Achieves optimal ventilation and oxygenation  Description: INTERVENTIONS:  - Assess for changes in respiratory status  - Assess for changes in mentation and behavior  - Position to facilitate oxygenation and minimize respiratory effort  - Oxygen administered by appropriate delivery if ordered  - Initiate smoking cessation education as indicated  - Encourage broncho-pulmonary hygiene including cough, deep breathe, Incentive Spirometry  - Assess the need for suctioning and aspirate as needed  - Assess and instruct to report SOB or any respiratory difficulty  - Respiratory Therapy support as indicated  Outcome: Progressing

## 2022-05-14 NOTE — PROGRESS NOTES
[place]  Progress Note Pina Speaker 2/28/1923, 80 y o  female MRN: [id number]  Unit/Bed#: -01 Encounter: [id number]  Primary Care Provider: No primary care provider on file  Date and time admitted to hospital: 5/7/2022 12:51 PM    * Acute on chronic combined systolic and diastolic CHF (congestive heart failure) (HCC)  Assessment & Plan  Wt Readings from Last 3 Encounters:   05/14/22 51 5 kg (113 lb 8 6 oz)   03/25/22 58 5 kg (129 lb)   03/10/22 53 6 kg (118 lb 2 7 oz)     · Patient has been having medications adjusted outpatient due to ongoing fluid retention in lower extremeties  · Weight as of 3/10/22 [id number]  Patient reports her weight had gone up to 130lbs  · Home diuretic regimen: torsemide 40mg BID with 2 5mg PRN metolazone  · Echocardiogram: LVEF 55%  Systolic function is normal   Wall motion is normal  Unable to assess diastolic function due to atrial fibrillation  Left atrium moderately dilated, right atrium severely dilated  Inferior vena cava dilated  No pericardial effusion  · Appreciate cardiology consult and recommendations   · Status post IV diuresis with Bumex, transitioned to PO torsemide 40 mg BID 5/13  · Monitor daily weights, intake and output   · Cardiac diet with salt and fluid restriction     Permanent atrial fibrillation (HCC)  Assessment & Plan  · Status post Bi-V dual PPM placement;  Last interrogation 3/2022- functioning normally  · [facility] with coumadin 2 5 mg daily, 5 mg Mondays  · Trend INR daily - was subtherapeutic in setting of recent vitamin K administration and holding of Coumadin  · 2 06 today, continue home dose    COVID-19 virus infection  Assessment & Plan  Patient was stable for discharge to rehab on 05/13 however subsequently tested positive for COVID  · Stable on room air and asymptomatic at this time  · No indication for COVID directed therapies  · Contact/airborne precautions    Hematoma of left lower extremity  Assessment & Plan  In the setting of mechanical fall with coumadin use   · CT LLE: Large lateral subcutaneous hematoma  · Evaluated by trauma, appreciate inputs   · Recommended initially holding Coumadin, administered vitamin K IV given large hematoma  · Hemoglobin has remained stable and coumadin resumed on 5/9     Bladder mass  Assessment & Plan  Incidential finding on CT LLE and confirmed with bladder US: Polypoid echogenic mass within the bladder along the posterior wall measuring 2 1 x 1 6 x 2 6 cm  · Referral to Urology outpatient     CAMPBELL (acute kidney injury) ([facility] [address] )  Assessment & Plan  · POA with creatinine of 2 54  Creatinine was 1 95 as of 3/10/2022, prior to this in 2014 baseline was approximately 1 3-1 5  · Suspect due to volume overload coupled with advancement of underlying kidney disease  · Status post IV diuresis as per cardiology   · Avoid nephrotoxins and hypotension   · Creatinine has remained relatively stable, monitor BMP as needed  · Referral to Nephrology as outpatient for further management    Fall  Assessment & Plan  · Mechanical fall due to loose shoes, typically ambulates with a walker  · PT/OT initially recommended home with VNA services, now recommending rehab to which patient is agreeable; CM following   · CT head negative for acute intracranial abnormalities  · Evaluated and cleared by trauma team for admission at [facility]      VTE Pharmacologic Prophylaxis: VTE Score: 5 High Risk (Score >/= 5) - Pharmacological DVT Prophylaxis Ordered: warfarin (Coumadin)  Sequential Compression Devices Ordered  Patient Centered Rounds: I performed bedside rounds with nursing staff today  Discussions with Specialists or Other Care Team Provider: Discussed with Cardiology  Transitioned     Education and Discussions with Family / Patient: Patient declined call to   Time Spent for Care: 30 minutes   More than 50% of total time spent on counseling and coordination of care as described above     Current Length of Stay: 7 day(s)  Current Patient Status: Inpatient   Certification Statement: The patient will continue to require additional inpatient hospital stay due to discharge planning  Discharge Plan: Anticipate discharge in 24-48 hrs to rehab facility  - may need PT/OT to reevaluate    Code Status: Level 1 - Full Code    Subjective:   Patient reports she feels fine, her legs look the best they have been very long time  She denies shortness of breath  Denies any COVID symptoms  She just wants to return to her previous living environment  Does not want to return home with her daughter, claims her daughter is undergoing chemotherapy and dialysis  Objective:     Vitals:   Temp (24hrs), Av 2 °F (36 8 °C), Min:97 7 °F (36 5 °C), Max:98 5 °F (36 9 °C)    Temp:  [97 7 °F (36 5 °C)-98 5 °F (36 9 °C)] 98 5 °F (36 9 °C)  HR:  [60-69] 60  Resp:  [16-18] 18  BP: (120-142)/(63-68) 120/63  SpO2:  [94 %-98 %] 94 %  Body mass index is 20 11 kg/m²  Input and Output Summary (last 24 hours): Intake/Output Summary (Last 24 hours) at 2022 1437  Last data filed at 2022 1300  Gross per 24 hour   Intake 600 ml   Output 200 ml   Net 400 ml       Physical Exam:   Physical Exam  Vitals and nursing note reviewed  Constitutional:       General: She is not in acute distress  Appearance: Normal appearance  She is well-developed  HENT:      Head: Normocephalic and atraumatic  Eyes:      General: No scleral icterus  Conjunctiva/sclera: Conjunctivae normal    Cardiovascular:      Rate and Rhythm: Normal rate  Rhythm irregularly irregular  Heart sounds: No murmur heard  Pulmonary:      Effort: Pulmonary effort is normal       Breath sounds: No wheezing, rhonchi or rales  Abdominal:      General: There is no distension  Palpations: Abdomen is soft  Skin:     General: Skin is warm and dry  Neurological:      General: No focal deficit present  Mental Status: She is alert  Psychiatric:         Mood and Affect: Mood normal          Additional Data:     Labs:  Results from last 7 days   Lab Units 05/13/22  0537   WBC Thousand/uL 4 30*   HEMOGLOBIN g/dL 8 9*   HEMATOCRIT % 27 6*   PLATELETS Thousands/uL 210   NEUTROS PCT % 74   LYMPHS PCT % 15   MONOS PCT % 9   EOS PCT % 1     Results from last 7 days   Lab Units 05/13/22  0537   SODIUM mmol/L 145   POTASSIUM mmol/L 3 6   CHLORIDE mmol/L 107   CO2 mmol/L 27   BUN mg/dL 38*   CREATININE mg/dL 1 80*   ANION GAP mmol/L 11   CALCIUM mg/dL 8 2*   GLUCOSE RANDOM mg/dL 97     Results from last 7 days   Lab Units 05/14/22  0451   INR  2 06*                   Lines/Drains:  Invasive Devices  Report    Peripheral Intravenous Line  Duration           Peripheral IV 05/11/22 Distal;Left;Upper;Ventral (anterior) Arm 2 days          Drain  Duration           External Urinary Catheter 5 days                      Imaging: Reviewed radiology reports from this admission including: ultrasound(s)    Recent Cultures (last 7 days):         Last 24 Hours Medication List:   Current Facility-Administered Medications   Medication Dose Route Frequency Provider Last Rate    acetaminophen  650 mg Oral Q6H PRN Kaelyn DALEY PA-C      aluminum-magnesium hydroxide-simethicone  30 mL Oral Q6H PRN Henri DALEY PA-C      vitamin B-12  250 mcg Oral Daily Kaelyn DALEY PA-C      ferrous sulfate  325 mg Oral Daily With Breakfast Kaelyn DALEY PA-C      fluticasone  1 spray Nasal Daily Eryn Ellis PA-C      glycerin-hypromellose-  2 drop Ophthalmic Q3H PRN Henri DALEY PA-C      metoprolol succinate  25 mg Oral Daily Maximiliano Cooper MD      ondansetron  4 mg Intravenous Q6H PRN Kaelyn DALEY PA-C      pantoprazole  40 mg Oral Daily Henri DALEY PA-C      polyethylene glycol  17 g Oral Daily PRN Willard Barrow PA-C      potassium chloride  20 mEq Oral Daily Kaelyn DALEY PA-C      saccharomyces boulardii  250 mg Oral BID Kaelyn DALEY PA-C      torsemide  40 mg Oral BID DINA Mtz      warfarin  2 5 mg Oral Daily (warfarin) Inessa Blount PA-C          Today, Patient Was Seen By: Prabhakar Richard PA-C    **Please Note: This note may have been constructed using a voice recognition system  **

## 2022-05-14 NOTE — ASSESSMENT & PLAN NOTE
· Mechanical fall due to loose shoes, typically ambulates with a walker  · PT/OT initially recommended home with VNA services, now recommending rehab to which patient is agreeable; CM following   · CT head negative for acute intracranial abnormalities  · Evaluated and cleared by trauma team for admission at [address] [place] [address]

## 2022-05-14 NOTE — ASSESSMENT & PLAN NOTE
· Status post Bi-V dual PPM placement;  Last interrogation 3/2022- functioning normally  · AC with coumadin 2 5 mg daily, 5 mg Mondays  · Trend INR daily - was subtherapeutic in setting of recent vitamin K administration and holding of Coumadin  · 2 06 today, continue home dose

## 2022-05-14 NOTE — ASSESSMENT & PLAN NOTE
Wt Readings from Last 3 Encounters:   05/14/22 51 5 kg (113 lb 8 6 oz)   03/25/22 58 5 kg (129 lb)   03/10/22 53 6 kg (118 lb 2 7 oz)     · Patient has been having medications adjusted outpatient due to ongoing fluid retention in lower extremeties  · Weight as of 3/10/22 [id number]  Patient reports her weight had gone up to 130lbs  · Home diuretic regimen: torsemide 40mg BID with 2 5mg PRN metolazone  · Echocardiogram: LVEF 55%  Systolic function is normal   Wall motion is normal  Unable to assess diastolic function due to atrial fibrillation  Left atrium moderately dilated, right atrium severely dilated  Inferior vena cava dilated  No pericardial effusion     · Appreciate cardiology consult and recommendations   · Status post IV diuresis with Bumex, transitioned to PO torsemide 40 mg BID 5/13  · Monitor daily weights, intake and output   · Cardiac diet with salt and fluid restriction

## 2022-05-15 LAB
INR PPP: 1.98 (ref 0.84–1.19)
PROTHROMBIN TIME: 22.1 SECONDS (ref 11.6–14.5)

## 2022-05-15 PROCEDURE — 99232 SBSQ HOSP IP/OBS MODERATE 35: CPT | Performed by: PHYSICIAN ASSISTANT

## 2022-05-15 PROCEDURE — 85610 PROTHROMBIN TIME: CPT | Performed by: PHYSICIAN ASSISTANT

## 2022-05-15 RX ORDER — BENZONATATE 100 MG/1
100 CAPSULE ORAL 3 TIMES DAILY PRN
Status: DISCONTINUED | OUTPATIENT
Start: 2022-05-15 | End: 2022-05-16 | Stop reason: HOSPADM

## 2022-05-15 RX ADMIN — METOPROLOL SUCCINATE 25 MG: 25 TABLET, FILM COATED, EXTENDED RELEASE ORAL at 09:04

## 2022-05-15 RX ADMIN — CYANOCOBALAMIN TAB 500 MCG 250 MCG: 500 TAB at 09:04

## 2022-05-15 RX ADMIN — PANTOPRAZOLE SODIUM 40 MG: 40 TABLET, DELAYED RELEASE ORAL at 09:03

## 2022-05-15 RX ADMIN — BENZONATATE 100 MG: 100 CAPSULE ORAL at 22:07

## 2022-05-15 RX ADMIN — BENZONATATE 100 MG: 100 CAPSULE ORAL at 00:23

## 2022-05-15 RX ADMIN — TORSEMIDE 40 MG: 20 TABLET ORAL at 17:39

## 2022-05-15 RX ADMIN — POTASSIUM CHLORIDE 20 MEQ: 20 TABLET, EXTENDED RELEASE ORAL at 09:04

## 2022-05-15 RX ADMIN — WARFARIN SODIUM 2.5 MG: 2.5 TABLET ORAL at 17:40

## 2022-05-15 RX ADMIN — Medication 250 MG: at 09:04

## 2022-05-15 RX ADMIN — BENZONATATE 100 MG: 100 CAPSULE ORAL at 09:12

## 2022-05-15 RX ADMIN — TORSEMIDE 40 MG: 20 TABLET ORAL at 09:03

## 2022-05-15 RX ADMIN — FLUTICASONE PROPIONATE 1 SPRAY: 50 SPRAY, METERED NASAL at 09:05

## 2022-05-15 RX ADMIN — Medication 250 MG: at 17:40

## 2022-05-15 RX ADMIN — FERROUS SULFATE TAB 325 MG (65 MG ELEMENTAL FE) 325 MG: 325 (65 FE) TAB at 09:03

## 2022-05-15 NOTE — ASSESSMENT & PLAN NOTE
· Mechanical fall due to loose shoes, typically ambulates with a walker  · PT/OT initially recommended home with VNA services, now recommending rehab to which patient is agreeable; CM following  · Will have them reevaluate Monday now that patient has maintained euvolemia   · CT head negative for acute intracranial abnormalities  · Evaluated and cleared by trauma team for admission at [address] [place] [address]

## 2022-05-15 NOTE — PLAN OF CARE
Problem: MOBILITY - ADULT  Goal: Maintain or return to baseline ADL function  Description: INTERVENTIONS:  -  Assess patient's ability to carry out ADLs; assess patient's baseline for ADL function and identify physical deficits which impact ability to perform ADLs (bathing, care of mouth/teeth, toileting, grooming, dressing, etc )  - Assess/evaluate cause of self-care deficits   - Assess range of motion  - Assess patient's mobility; develop plan if impaired  - Assess patient's need for assistive devices and provide as appropriate  - Encourage maximum independence but intervene and supervise when necessary  - Involve family in performance of ADLs  - Assess for home care needs following discharge   - Consider OT consult to assist with ADL evaluation and planning for discharge  - Provide patient education as appropriate  5/15/2022 0045 by Luke Prescott RN  Outcome: Progressing  5/15/2022 0045 by Luke Prescott RN  Outcome: Progressing  5/15/2022 0038 by Luke Prescott RN  Outcome: Progressing  Goal: Maintains/Returns to pre admission functional level  Description: INTERVENTIONS:  - Perform BMAT or MOVE assessment daily    - Set and communicate daily mobility goal to care team and patient/family/caregiver  - Collaborate with rehabilitation services on mobility goals if consulted  - Perform Range of Motion 2 times a day  - Reposition patient every 2 hours    - Dangle patient 2 times a day  - Stand patient 2 times a day  - Ambulate patient 2 times a day  - Out of bed to chair 2 times a day   - Out of bed for meals 2 times a day  - Out of bed for toileting  - Record patient progress and toleration of activity level   5/15/2022 0045 by Luke Prescott RN  Outcome: Progressing  5/15/2022 0045 by Luke Prescott RN  Outcome: Progressing  5/15/2022 0038 by Luke Prescott RN  Outcome: Progressing     Problem: Prexisting or High Potential for Compromised Skin Integrity  Goal: Skin integrity is maintained or improved  Description: INTERVENTIONS:  - Identify patients at risk for skin breakdown  - Assess and monitor skin integrity  - Assess and monitor nutrition and hydration status  - Monitor labs   - Assess for incontinence   - Turn and reposition patient  - Assist with mobility/ambulation  - Relieve pressure over bony prominences  - Avoid friction and shearing  - Provide appropriate hygiene as needed including keeping skin clean and dry  - Evaluate need for skin moisturizer/barrier cream  - Collaborate with interdisciplinary team   - Patient/family teaching  - Consider wound care consult   5/15/2022 0045 by Vernell Cole RN  Outcome: Progressing  5/15/2022 0045 by Vernell Cole RN  Outcome: Progressing  5/15/2022 0038 by Vernell Cole RN  Outcome: Progressing     Problem: Nutrition/Hydration-ADULT  Goal: Nutrient/Hydration intake appropriate for improving, restoring or maintaining nutritional needs  Description: Monitor and assess patient's nutrition/hydration status for malnutrition  Collaborate with interdisciplinary team and initiate plan and interventions as ordered  Monitor patient's weight and dietary intake as ordered or per policy  Utilize nutrition screening tool and intervene as necessary  Determine patient's food preferences and provide high-protein, high-caloric foods as appropriate       INTERVENTIONS:  - Monitor oral intake, urinary output, labs, and treatment plans  - Assess nutrition and hydration status and recommend course of action  - Evaluate amount of meals eaten  - Assist patient with eating if necessary   - Allow adequate time for meals  - Recommend/ encourage appropriate diets, oral nutritional supplements, and vitamin/mineral supplements  - Order, calculate, and assess calorie counts as needed  - Recommend, monitor, and adjust tube feedings and TPN/PPN based on assessed needs  - Assess need for intravenous fluids  - Provide specific nutrition/hydration education as appropriate  - Include patient/family/caregiver in decisions related to nutrition  5/15/2022 0045 by Fredrick Millard RN  Outcome: Progressing  5/15/2022 0045 by Fredrick Millard RN  Outcome: Progressing  5/15/2022 0038 by Fredrick Millard RN  Outcome: Progressing     Problem: Potential for Falls  Goal: Patient will remain free of falls  Description: INTERVENTIONS:  - Educate patient/family on patient safety including physical limitations  - Instruct patient to call for assistance with activity   - Consult OT/PT to assist with strengthening/mobility   - Keep Call bell within reach  - Keep bed low and locked with side rails adjusted as appropriate  - Keep care items and personal belongings within reach  - Initiate and maintain comfort rounds  - Make Fall Risk Sign visible to staff  - Offer Toileting every 2 Hours, in advance of need  - Initiate/Maintain bed alarm  - Obtain necessary fall risk management equipment:   - Apply yellow socks and bracelet for high fall risk patients  - Consider moving patient to room near nurses station  5/15/2022 0045 by Fredrick Millard RN  Outcome: Progressing  5/15/2022 0045 by Fredrick Millard RN  Outcome: Progressing  5/15/2022 0038 by Fredrick Millard RN  Outcome: Progressing     Problem: RESPIRATORY - ADULT  Goal: Achieves optimal ventilation and oxygenation  Description: INTERVENTIONS:  - Assess for changes in respiratory status  - Assess for changes in mentation and behavior  - Position to facilitate oxygenation and minimize respiratory effort  - Oxygen administered by appropriate delivery if ordered  - Initiate smoking cessation education as indicated  - Encourage broncho-pulmonary hygiene including cough, deep breathe, Incentive Spirometry  - Assess the need for suctioning and aspirate as needed  - Assess and instruct to report SOB or any respiratory difficulty  - Respiratory Therapy support as indicated  5/15/2022 0045 by Fredrick Millard RN  Outcome: Progressing  5/15/2022 0045 by Clark Terrell Jeronimo Crowley RN  Outcome: Progressing  5/15/2022 0038 by Jo Theodore, RN  Outcome: Progressing

## 2022-05-15 NOTE — ASSESSMENT & PLAN NOTE
Patient was stable for discharge to rehab on 05/13 however subsequently tested positive for COVID  · Stable on room air and asymptomatic at this time  · No indication for COVID directed therapies  · Contact/airborne precautions  · Now will need facility that can accept COVID patients on discharge

## 2022-05-15 NOTE — ASSESSMENT & PLAN NOTE
In the setting of mechanical fall with coumadin use   · CT LLE: Large lateral subcutaneous hematoma     · Evaluated by trauma, appreciate inputs   · Recommended initially holding Coumadin, administered vitamin K IV given large hematoma  · Hemoglobin has remained stable and coumadin resumed on 5/9   · Sutures on knee can likely be removed 5/17, pending further healing

## 2022-05-15 NOTE — ASSESSMENT & PLAN NOTE
Wt Readings from Last 3 Encounters:   05/15/22 48 4 kg (106 lb 11 2 oz)   03/25/22 58 5 kg (129 lb)   03/10/22 53 6 kg (118 lb 2 7 oz)     · Patient has been having medications adjusted outpatient due to ongoing fluid retention in lower extremeties  · Weight as of 3/10/22 was 118lbs  Patient reports her weight had gone up to 130lbs  · Home diuretic regimen: torsemide 40mg BID with 2 5mg PRN metolazone  · Echocardiogram: LVEF 55%  Systolic function is normal   Wall motion is normal  Unable to assess diastolic function due to atrial fibrillation  Left atrium moderately dilated, right atrium severely dilated  Inferior vena cava dilated  No pericardial effusion     · Appreciate cardiology consult and recommendations   · Status post IV diuresis with Bumex, transitioned to PO torsemide 40 mg BID 5/13  · Stable at 106lbs  · Monitor daily weights, intake and output   · Cardiac diet with salt and fluid restriction

## 2022-05-15 NOTE — ASSESSMENT & PLAN NOTE
· Status post Bi-V dual PPM placement;  Last interrogation 3/2022- functioning normally  · AC with coumadin 2 5 mg daily, 5 mg Mondays  · Trend INR daily - was subtherapeutic in setting of recent vitamin K administration and holding of Coumadin  · 1 98 today, continue home dose

## 2022-05-15 NOTE — PROGRESS NOTES
[place]  Progress Note Tin Gill 2/28/1923, 80 y o  female MRN: [id number]  Unit/Bed#: -01 Encounter: [id number]  Primary Care Provider: No primary care provider on file  Date and time admitted to hospital: 5/7/2022 12:51 PM    * Acute on chronic combined systolic and diastolic CHF (congestive heart failure) (HCC)  Assessment & Plan  Wt Readings from Last 3 Encounters:   05/15/22 48 4 kg (106 lb 11 2 oz)   03/25/22 58 5 kg (129 lb)   03/10/22 53 6 kg (118 lb 2 7 oz)     · Patient has been having medications adjusted outpatient due to ongoing fluid retention in lower extremeties  · Weight as of 3/10/22 was 118lbs  Patient reports her weight had gone up to 130lbs  · Home diuretic regimen: torsemide 40mg BID with 2 5mg PRN metolazone  · Echocardiogram: LVEF 55%  Systolic function is normal   Wall motion is normal  Unable to assess diastolic function due to atrial fibrillation  Left atrium moderately dilated, right atrium severely dilated  Inferior vena cava dilated  No pericardial effusion  · Appreciate cardiology consult and recommendations   · Status post IV diuresis with Bumex, transitioned to PO torsemide 40 mg BID 5/13  · Stable at 106lbs  · Monitor daily weights, intake and output   · Cardiac diet with salt and fluid restriction     Permanent atrial fibrillation (HCC)  Assessment & Plan  · Status post Bi-V dual PPM placement;  Last interrogation 3/2022- functioning normally  · AC with coumadin 2 5 mg daily, 5 mg Mondays  · Trend INR daily - was subtherapeutic in setting of recent vitamin K administration and holding of Coumadin  · 1 98 today, continue home dose    COVID-19 virus infection  Assessment & Plan  Patient was stable for discharge to rehab on 05/13 however subsequently tested positive for COVID  · Stable on room air and asymptomatic at this time  · No indication for COVID directed therapies  · Contact/airborne precautions  · Now will need facility that can accept COVID patients on discharge    Hematoma of left lower extremity  Assessment & Plan  In the setting of mechanical fall with coumadin use   · CT LLE: Large lateral subcutaneous hematoma  · Evaluated by trauma, appreciate inputs   · Recommended initially holding Coumadin, administered vitamin K IV given large hematoma  · Hemoglobin has remained stable and coumadin resumed on 5/9   · Sutures on knee can likely be removed 5/17, pending further healing    Bladder mass  Assessment & Plan  Incidential finding on CT LLE and confirmed with bladder US: Polypoid echogenic mass within the bladder along the posterior wall measuring 2 1 x 1 6 x 2 6 cm  · Referral to Urology outpatient     CAMPBELL (acute kidney injury) ([facility] [address] )  Assessment & Plan  · POA with creatinine of 2 54  Creatinine was 1 95 as of 3/10/2022, prior to this in 2014 baseline was approximately 1 3-1 5  · Suspect due to volume overload coupled with advancement of underlying kidney disease  · Status post IV diuresis as per cardiology   · Avoid nephrotoxins and hypotension   · Creatinine has remained relatively stable, monitor BMP as needed  · Referral to Nephrology as outpatient for further management    Fall  Assessment & Plan  · Mechanical fall due to loose shoes, typically ambulates with a walker  · PT/OT initially recommended home with VNA services, now recommending rehab to which patient is agreeable; CM following  · Will have them reevaluate Monday now that patient has maintained euvolemia   · CT head negative for acute intracranial abnormalities  · Evaluated and cleared by trauma team for admission at [facility]      VTE Pharmacologic Prophylaxis: VTE Score: 5 High Risk (Score >/= 5) - Pharmacological DVT Prophylaxis Ordered: warfarin (Coumadin)  Sequential Compression Devices Ordered  Patient Centered Rounds: I performed bedside rounds with nursing staff today    Discussions with Specialists or Other Care Team Provider: Discussed with CM    Education and Discussions with Family / Patient: Patient declined call to   Time Spent for Care: 30 minutes  More than 50% of total time spent on counseling and coordination of care as described above  Current Length of Stay: 8 day(s)  Current Patient Status: Inpatient   Certification Statement: The patient will continue to require additional inpatient hospital stay due to discharge planning, remains medically stable  Discharge Plan: Anticipate discharge in 24-48 hrs to discharge location to be determined pending rehab evaluations  Code Status: Level 1 - Full Code    Subjective:   Patient continues to be asymptomatic from COVID perspective, denies shortness of breath  Reports that lower extremity edema has improved in hematoma remains stable, she wants to leave the hospital and is eager for discharge  Objective:     Vitals:   Temp (24hrs), Av 6 °F (36 4 °C), Min:97 5 °F (36 4 °C), Max:97 8 °F (36 6 °C)    Temp:  [97 5 °F (36 4 °C)-97 8 °F (36 6 °C)] 97 6 °F (36 4 °C)  HR:  [60-70] 70  Resp:  [14-18] 14  BP: (130-132)/(66-80) 131/66  SpO2:  [95 %-97 %] 96 %  Body mass index is 18 9 kg/m²  Input and Output Summary (last 24 hours): Intake/Output Summary (Last 24 hours) at 5/15/2022 1226  Last data filed at 5/15/2022 1223  Gross per 24 hour   Intake 480 ml   Output 1175 ml   Net -695 ml       Physical Exam:   Physical Exam  Vitals and nursing note reviewed  Constitutional:       General: She is not in acute distress  Appearance: Normal appearance  She is well-developed  HENT:      Head: Normocephalic and atraumatic  Eyes:      General: No scleral icterus  Conjunctiva/sclera: Conjunctivae normal    Cardiovascular:      Rate and Rhythm: Normal rate  Rhythm irregularly irregular  Heart sounds: Murmur heard  Pulmonary:      Effort: Pulmonary effort is normal       Breath sounds: No wheezing, rhonchi or rales  Abdominal:      General: There is no distension        Palpations: Abdomen is soft  Skin:     General: Skin is warm and dry  Neurological:      General: No focal deficit present  Mental Status: She is alert     Psychiatric:         Mood and Affect: Mood normal           Additional Data:     Labs:  Results from last 7 days   Lab Units 05/13/22  0537   WBC Thousand/uL 4 30*   HEMOGLOBIN g/dL 8 9*   HEMATOCRIT % 27 6*   PLATELETS Thousands/uL 210   NEUTROS PCT % 74   LYMPHS PCT % 15   MONOS PCT % 9   EOS PCT % 1     Results from last 7 days   Lab Units 05/13/22  0537   SODIUM mmol/L 145   POTASSIUM mmol/L 3 6   CHLORIDE mmol/L 107   CO2 mmol/L 27   BUN mg/dL 38*   CREATININE mg/dL 1 80*   ANION GAP mmol/L 11   CALCIUM mg/dL 8 2*   GLUCOSE RANDOM mg/dL 97     Results from last 7 days   Lab Units 05/15/22  0433   INR  1 98*                   Lines/Drains:  Invasive Devices  Report    Peripheral Intravenous Line  Duration           Peripheral IV 05/11/22 Distal;Left;Upper;Ventral (anterior) Arm 3 days          Drain  Duration           External Urinary Catheter 6 days                      Imaging: Reviewed radiology reports from this admission including: ultrasound(s)    Recent Cultures (last 7 days):         Last 24 Hours Medication List:   Current Facility-Administered Medications   Medication Dose Route Frequency Provider Last Rate    acetaminophen  650 mg Oral Q6H PRN Kaelyn DALEY PA-C      aluminum-magnesium hydroxide-simethicone  30 mL Oral Q6H PRN Kaelyn DALEY PA-C      benzonatate  100 mg Oral TID PRN Jovanny Gomez PA-C      vitamin B-12  250 mcg Oral Daily Kaelyn DALEY PA-C      ferrous sulfate  325 mg Oral Daily With Breakfast Kaelyn DALEY PA-C      fluticasone  1 spray Nasal Daily Elan Carter PA-C      glycerin-hypromellose-  2 drop Ophthalmic Q3H PRN Ricky DALEY PA-C      metoprolol succinate  25 mg Oral Daily Astrid Vivas MD      ondansetron  4 mg Intravenous Q6H PRN Kaelyn DALEY PA-C      pantoprazole  40 mg Oral Daily Kaelyn DALEY PA-C      polyethylene glycol  17 g Oral Daily PRN Mahamed Mccann PA-C      potassium chloride  20 mEq Oral Daily [facility] EDER DALEY      saccharomyces boulardii  250 mg Oral BID Yue Kuhn PA-C      torsemide  40 mg Oral BID DINA Dupree      warfarin  2 5 mg Oral Daily (warfarin) Yue Kuhn PA-C          Today, Patient Was Seen By: German Ledesma PA-C    **Please Note: This note may have been constructed using a voice recognition system  **

## 2022-05-16 VITALS
WEIGHT: 105 LBS | DIASTOLIC BLOOD PRESSURE: 68 MMHG | HEIGHT: 63 IN | OXYGEN SATURATION: 96 % | HEART RATE: 62 BPM | TEMPERATURE: 97.1 F | SYSTOLIC BLOOD PRESSURE: 127 MMHG | BODY MASS INDEX: 18.61 KG/M2 | RESPIRATION RATE: 18 BRPM

## 2022-05-16 LAB
INR PPP: 2.42 (ref 0.84–1.19)
PROTHROMBIN TIME: 25.7 SECONDS (ref 11.6–14.5)
SARS-COV-2 RNA RESP QL NAA+PROBE: POSITIVE

## 2022-05-16 PROCEDURE — 97116 GAIT TRAINING THERAPY: CPT

## 2022-05-16 PROCEDURE — 85610 PROTHROMBIN TIME: CPT | Performed by: PHYSICIAN ASSISTANT

## 2022-05-16 PROCEDURE — 99239 HOSP IP/OBS DSCHRG MGMT >30: CPT | Performed by: PHYSICIAN ASSISTANT

## 2022-05-16 PROCEDURE — 97530 THERAPEUTIC ACTIVITIES: CPT

## 2022-05-16 RX ORDER — WARFARIN SODIUM 2.5 MG/1
2.5 TABLET ORAL
Refills: 0
Start: 2022-05-16 | End: 2022-06-29

## 2022-05-16 RX ORDER — BENZONATATE 100 MG/1
100 CAPSULE ORAL 3 TIMES DAILY PRN
Qty: 20 CAPSULE | Refills: 0
Start: 2022-05-16

## 2022-05-16 RX ADMIN — Medication 250 MG: at 08:42

## 2022-05-16 RX ADMIN — FLUTICASONE PROPIONATE 1 SPRAY: 50 SPRAY, METERED NASAL at 08:48

## 2022-05-16 RX ADMIN — TORSEMIDE 40 MG: 20 TABLET ORAL at 08:43

## 2022-05-16 RX ADMIN — POTASSIUM CHLORIDE 20 MEQ: 20 TABLET, EXTENDED RELEASE ORAL at 08:43

## 2022-05-16 RX ADMIN — CYANOCOBALAMIN TAB 500 MCG 250 MCG: 500 TAB at 08:42

## 2022-05-16 RX ADMIN — FERROUS SULFATE TAB 325 MG (65 MG ELEMENTAL FE) 325 MG: 325 (65 FE) TAB at 08:42

## 2022-05-16 RX ADMIN — METOPROLOL SUCCINATE 25 MG: 25 TABLET, FILM COATED, EXTENDED RELEASE ORAL at 08:42

## 2022-05-16 RX ADMIN — PANTOPRAZOLE SODIUM 40 MG: 40 TABLET, DELAYED RELEASE ORAL at 08:43

## 2022-05-16 NOTE — ASSESSMENT & PLAN NOTE
· Status post Bi-V dual PPM placement;  Last interrogation 3/2022- functioning normally  · AC with coumadin 2 5 mg daily, 5 mg Mondays  · Trend INR daily - was subtherapeutic in setting of recent vitamin K administration and holding of Coumadin  · 2 42 today, continue home dose

## 2022-05-16 NOTE — OCCUPATIONAL THERAPY NOTE
Occupational Therapy Tx Note     Patient Name: Austin Ellison  JFINL'P Date: 5/16/2022  Problem List  Principal Problem:    Acute on chronic combined systolic and diastolic CHF (congestive heart failure) ([facility] )  Active Problems:    Fall    Permanent atrial fibrillation ([facility] )    CAMPBELL (acute kidney injury) ([facility] )    Bladder mass    Hematoma of left lower extremity    COVID-19 virus infection              05/16/22 1022   OT Last Visit   OT Visit Date 05/16/22   Note Type   Note Type Treatment   Restrictions/Precautions   Weight Bearing Precautions Per Order No   Other Precautions Contact/isolation; Airborne/isolation   Pain Assessment   Pain Assessment Tool 0-10   Pain Score No Pain   ADL   LB Dressing Assistance 5  Supervision/Setup   LB Dressing Deficit Don/doff L sock; Don/doff R sock   LB Dressing Comments Seated EOB, doffing B/L socks using figure 4 position   Toileting Comments Pt declined using the rest room   Bed Mobility   Sit to Supine 6  Modified independent   Additional items Bedrails   Transfers   Sit to Stand 5  Supervision   Additional items Increased time required;Armrests   Stand to Sit 5  Supervision   Additional items Increased time required   Functional Mobility   Functional Mobility 5  Supervision   Additional items Rolling walker   Cognition   Overall Cognitive Status WFL   Arousal/Participation Alert   Attention Within functional limits   Orientation Level Oriented X4   Memory Within functional limits   Following Commands Follows all commands and directions without difficulty   Activity Tolerance   Activity Tolerance Patient tolerated treatment well   Medical Staff Made Aware PT Adelina   Assessment   Assessment Pt seen for OT tx session with focus on functional balance, functional mobility, ADL status, and transfer safety  Patient agreeable to OT treatment session  Pt received seated OOB to Recliner  Pt requesting to get back to bed for a nap   Pt completed sit <> stands with S and functional mobility with S & RW  Pt able to doff B/L socks with S while seated EOB utilizing figure 4 position  Pt completed sit > supine with Mod I & use of bed rails  Patient continues to be functioning below baseline level, occupational performance remains limited secondary to factors listed above, and pt at increased risk for falls and injury  The patient's raw score on the AM-PAC Daily Activity inpatient short form is 20, standardized score is 42 03, greater than 39 4  Patients at this level are likely to benefit from DC to home  Please refer to the recommendation of the Occupational Therapist for safe DC planning  From OT standpoint, recommendation at time of d/c would be Home OT  Patient to benefit from continued Occupational Therapy treatment while in the hospital to address deficits as defined above and maximize level of functional independence with ADLs and functional mobility  Pt left with call bell in reach, tray table in reach, needs met  Plan   Treatment Interventions ADL retraining;Functional transfer training; Endurance training;Patient/family training; Compensatory technique education; Energy conservation   Goal Expiration Date 05/19/22   OT Treatment Day 2   OT Frequency 2-3x/wk   Recommendation   OT Discharge Recommendation Home with home health rehabilitation   AM-PAC Daily Activity Inpatient   Lower Body Dressing 3   Bathing 3   Toileting 3   Upper Body Dressing 3   Grooming 4   Eating 4   Daily Activity Raw Score 20   Daily Activity Standardized Score (Calc for Raw Score >=11) 42 03   AM-PAC Applied Cognition Inpatient   Following a Speech/Presentation 4   Understanding Ordinary Conversation 4   Taking Medications 3   Remembering Where Things Are Placed or Put Away 4   Remembering List of 4-5 Errands 4   Taking Care of Complicated Tasks 3   Applied Cognition Raw Score 22   Applied Cognition Standardized Score 47 83     Patricia Villavicencio, OTR/L

## 2022-05-16 NOTE — CASE MANAGEMENT
Case Management Discharge Planning Note    Patient name Leidy Chisholm  Location [address] [id number] MRN [id number]  : 1923 Date 2022       Current Admission Date: 2022  Current Admission Diagnosis:Acute on chronic combined systolic and diastolic CHF (congestive heart failure) 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 03/10/2022    Chronic diastolic CHF (congestive heart failure) ([facility] ) 2021    Permanent atrial fibrillation ([facility] ) 2014    Coronary artery disease 2013    Hyperlipidemia 08/15/2012    Hypertension 2012      LOS (days): 9  Geometric Mean LOS (GMLOS) (days): 3 80  Days to GMLOS:-5 1     OBJECTIVE:  Risk of Unplanned Readmission Score: 14 76         Current admission status: Inpatient   Preferred Pharmacy:   [facility], [address] [address]  Phone: [phone] Fax: [phone]    Primary Care Provider: No primary care provider on file  Primary Insurance: MEDICARE  Secondary Insurance:  FOR LIFE    DISCHARGE DETAILS:    Treatment Team Recommendation: Home  Discharge Destination Plan[de-identified] Home  Transport at Discharge : [address] [facility] [place]     IMM Given (Date):: 22  IMM Given to[de-identified] Family  Family notified[de-identified] daughter Keon Gold  Additional Comments: CM was informed that pt is medically stable for dc  Pt and daughter Keon Gold are requesting pt return to [facility]  CM spoke to Christo Lin at [facility] [address] who states she will review with her team once therapy notes are received  CM faxed therapy notes to Christo Lin   Received a call back from Christo Lin who states pt is able to return to her room at [facility]  CM spoke to pt's daughter [facility] to discuss same  CM discussed WC Ariana Santanasherin cost with [facility]; She is aware and agreeable  CM requested a 1500  time via [address]; awaiting confirmation of time      Accepting Facility Name, [address] : [facility]  Receiving Facility/Agency Phone Number: [phone]  Facility/Agency Fax Number: [phone]

## 2022-05-16 NOTE — PHYSICAL THERAPY NOTE
PT tx   05/16/22 1023   PT Last Visit   PT Visit Date 05/16/22   Note Type   Note Type Treatment   Pain Assessment   Pain Assessment Tool 0-10   Pain Score No Pain   Restrictions/Precautions   Other Precautions Droplet precautions; Airborne/isolation;Contact/isolation   General   Chart Reviewed Yes   Cognition   Overall Cognitive Status WFL   Arousal/Participation Alert   Attention Within functional limits   Orientation Level Oriented X4   Memory Within functional limits   Following Commands Follows all commands and directions without difficulty   Subjective   Subjective Pt anxious for d/c   Bed Mobility   Sit to Supine 7  Independent   Transfers   Sit to Stand 6  Modified independent   Stand to Sit 6  Modified independent   Stand pivot 6  Modified independent   Additional items   (rw)   Ambulation/Elevation   Gait pattern WNL   Gait Assistance 6  Modified independent   Assistive Device Rolling walker   Distance 25'   Balance   Static Sitting Normal   Dynamic Sitting Good   Static Standing Good   Dynamic Standing Good   Ambulatory Good   Endurance Deficit   Endurance Deficit No  (on room air sats >90%)   Activity Tolerance   Activity Tolerance Patient tolerated treatment well   Medical Staff Made Aware OT Judy   Assessment   Prognosis Good   Problem List Decreased endurance   Assessment Pt able to amb in room with rw on rom air no sob  Does tire quickly but safe wiht mobility  Anxious to d/c   Recommend home PT at d c   Barriers to Discharge   (medical clearance)   Goals   Patient Goals d/c   Plan   Treatment/Interventions Gait training   Progress Improving as expected   PT Frequency 3-5x/wk   Recommendation   PT Discharge Recommendation Home with home health rehabilitation   Jade Paniagua [phone]   Turning in Bed Without Bedrails 4   Lying on Back to Sitting on Edge of Flat Bed 4   Moving Bed to Chair 4   Standing Up From Chair 4   Walk in Room 4   Climb 3-5 Stairs 3   Basic Mobility Inpatient Raw Score 23   Basic Mobility Standardized Score 50 88   Highest Level Of Mobility   -HLM Goal 7: Walk 25 feet or more   -HLM Achieved 7: Walk 25 feet or more   Simin Landrum, PT

## 2022-05-16 NOTE — ASSESSMENT & PLAN NOTE
Wt Readings from Last 3 Encounters:   05/16/22 47 6 kg (105 lb)   03/25/22 58 5 kg (129 lb)   03/10/22 53 6 kg (118 lb 2 7 oz)     · Patient has been having medications adjusted outpatient due to ongoing fluid retention in lower extremeties  · Weight as of 3/10/22 was 118lbs  Patient reports her weight had gone up to 130lbs  · Home diuretic regimen: torsemide 40mg BID with 2 5mg PRN metolazone  · Echocardiogram: LVEF 55%  Systolic function is normal   Wall motion is normal  Unable to assess diastolic function due to atrial fibrillation  Left atrium moderately dilated, right atrium severely dilated  Inferior vena cava dilated  No pericardial effusion     · Appreciate cardiology consult and recommendations   · Status post IV diuresis with Bumex, transitioned to PO torsemide 40 mg BID 5/13  · Stable at 105lbs  · Monitor daily weights, intake and output   · Cardiac diet with salt and fluid restriction

## 2022-05-16 NOTE — PLAN OF CARE
Problem: OCCUPATIONAL THERAPY ADULT  Goal: Performs self-care activities at highest level of function for planned discharge setting  See evaluation for individualized goals  Description: Treatment Interventions: ADL retraining, Functional transfer training, Endurance training, Patient/family training, Compensatory technique education, Energy conservation          See flowsheet documentation for full assessment, interventions and recommendations  Outcome: Progressing  Note: Limitation: Decreased ADL status  Prognosis: Good  Assessment: Pt seen for OT tx session with focus on functional balance, functional mobility, ADL status, and transfer safety  Patient agreeable to OT treatment session  Pt received seated OOB to Recliner  Pt requesting to get back to bed for a nap  Pt completed sit <> stands with S and functional mobility with S & RW  Pt able to doff B/L socks with S while seated EOB utilizing figure 4 position  Pt completed sit > supine with Mod I & use of bed rails  Patient continues to be functioning below baseline level, occupational performance remains limited secondary to factors listed above, and pt at increased risk for falls and injury  The patient's raw score on the AM-PAC Daily Activity inpatient short form is 20, standardized score is 42 03, greater than 39 4  Patients at this level are likely to benefit from DC to home  Please refer to the recommendation of the Occupational Therapist for safe DC planning  From OT standpoint, recommendation at time of d/c would be Home OT  Patient to benefit from continued Occupational Therapy treatment while in the hospital to address deficits as defined above and maximize level of functional independence with ADLs and functional mobility  Pt left with call bell in reach, tray table in reach, needs met       OT Discharge Recommendation: Home with home health rehabilitation

## 2022-05-16 NOTE — CASE MANAGEMENT
Case Management Discharge Planning Note    Patient name Pamela Valdez Luite Bobby [id number] MRN [id number]  : 1923 Date 2022       Current Admission Date: 2022  Current Admission Diagnosis:Acute on chronic combined systolic and diastolic CHF (congestive heart failure) [facility])   Patient Active Problem List    Diagnosis Date Noted    Bladder mass 2022    Hematoma of left lower extremity 2022    COVID-19 virus infection 2022    Acute on chronic combined systolic and diastolic [facility]congest[facility] 2022    Fall 2022    CAMPBELL (acute kidney injury) ([facility] ) 2022    Pacemaker at end of battery life 03/10/2022    Chronic diastolic CHF (congestive heart failure) ([facility] ) 2021    Permanent atrial fibrillation ([place] [address] ) 2014    Coronary artery disease 2013    Hyperlipidemia 08/15/2012    Hypertension 2012      LOS (days): 9  Geometric Mean LOS (GMLOS) (days): 3 80  Days to GMLOS:-5 2     OBJECTIVE:  Risk of Unplanned Readmission Score: 14 76         Current admission status: Inpatient   Preferred Pharmacy:   [facility], [address] [place] [address]  Phone: [phone] Fax: [phone]    Primary Care Provider: No primary care provider on file  Primary Insurance: MEDICARE  Secondary Insurance:  FOR LIFE    DISCHARGE DETAILS:       Transport at Discharge : [facility]  Dispatcher Contacted: Yes  Number/Name of Dispatcher: [employer]     ETA of Transport (Date): 22  ETA of Transport (Time): 1700     Transfer Mode: Wheelchair     Additional Comments: CM was informed that [employer] will provide WC Van transport at 1700  CM notified pt's daughter Quyen Borjasrosadeb, pt, pt's bedside RN Polly Chavez, and [address] at [facility] time  Facility transfer form completed

## 2022-05-18 ENCOUNTER — ANTICOAG VISIT (OUTPATIENT)
Dept: CARDIOLOGY CLINIC | Facility: CLINIC | Age: 87
End: 2022-05-18

## 2022-05-18 ENCOUNTER — EPISODE CHANGES (OUTPATIENT)
Dept: CASE MANAGEMENT | Facility: OTHER | Age: 87
End: 2022-05-18

## 2022-05-18 LAB — INR PPP: 3.9 (ref 0.84–1.19)

## 2022-05-18 NOTE — PROGRESS NOTES
Spoke with Adrienne Ng at [facility], advised I was aware of admission and positive COVID 5/13/22  She also states patient lost 21 4 lbs      Advised INR high will hold tonight and tomorrow, then take 2 5 mg daily and recheck 5/23/22    Physician order faxed

## 2022-05-23 ENCOUNTER — ANTICOAG VISIT (OUTPATIENT)
Dept: CARDIOLOGY CLINIC | Facility: CLINIC | Age: 87
End: 2022-05-23

## 2022-05-23 LAB — INR PPP: 5.1 (ref 0.84–1.19)

## 2022-05-23 NOTE — PROGRESS NOTES
Spoke with Giancarlo Simmons, advised INR high, states nothing has changed, no new medications, advised to hold tonight till 5/26/22, will recheck 5/27/22      Physician order faxed

## 2022-05-27 ENCOUNTER — ANTICOAG VISIT (OUTPATIENT)
Dept: CARDIOLOGY CLINIC | Facility: CLINIC | Age: 87
End: 2022-05-27

## 2022-05-27 LAB — INR PPP: 2.2 (ref 0.84–1.19)

## 2022-06-02 ENCOUNTER — ANTICOAG VISIT (OUTPATIENT)
Dept: CARDIOLOGY CLINIC | Facility: CLINIC | Age: 87
End: 2022-06-02

## 2022-06-02 LAB — INR PPP: 2.9 (ref 0.84–1.19)

## 2022-06-02 NOTE — PROGRESS NOTES
Spoke with Dominik Joya at [facility]  Advised INR going up, only change is BP low, metoprolol was discontinues  Advised to have patient take 1 25 mg Tues Thurs, 2 5 mg all other days   Recheck INR 6/9/22    Physician order faxed
No difficulties

## 2022-06-03 ENCOUNTER — TELEPHONE (OUTPATIENT)
Dept: NEPHROLOGY | Facility: CLINIC | Age: 87
End: 2022-06-03

## 2022-06-07 ENCOUNTER — OFFICE VISIT (OUTPATIENT)
Dept: CARDIOLOGY CLINIC | Facility: CLINIC | Age: 87
End: 2022-06-07
Payer: MEDICARE

## 2022-06-07 VITALS
WEIGHT: 107.2 LBS | DIASTOLIC BLOOD PRESSURE: 62 MMHG | SYSTOLIC BLOOD PRESSURE: 118 MMHG | HEART RATE: 71 BPM | BODY MASS INDEX: 19 KG/M2 | HEIGHT: 63 IN

## 2022-06-07 DIAGNOSIS — I48.20 CHRONIC ATRIAL FIBRILLATION (HCC): ICD-10-CM

## 2022-06-07 DIAGNOSIS — I50.32 CHRONIC DIASTOLIC CHF (CONGESTIVE HEART FAILURE) (HCC): Primary | ICD-10-CM

## 2022-06-07 DIAGNOSIS — Z95.0 PRESENCE OF PERMANENT CARDIAC PACEMAKER: ICD-10-CM

## 2022-06-07 PROCEDURE — 99214 OFFICE O/P EST MOD 30 MIN: CPT | Performed by: INTERNAL MEDICINE

## 2022-06-07 NOTE — PROGRESS NOTES
Cardiology Follow Up    Adrianna Naik  2/28/1923  [id number]  [facility] [address] [address] [phone]    1  Chronic diastolic CHF (congestive heart failure) ([facility] )     2  Chronic atrial fibrillation (HCC)     3  Presence of permanent cardiac pacemaker         Interval History: Followup CHF and AFIB    Doing well  No chest pain, stable dyspnea and stable LE edema  Weight is up a couple pounds       Medical Problems             Problem List     Chronic diastolic CHF (congestive heart failure) ([facility])    Wt Readings from Last 3 Encounters:   06/07/22 48 6 kg (107 lb 3 2 oz)   05/16/22 47 6 kg (105 lb)   03/25/22 58 5 kg (129 lb)                   Pacemaker at end of battery life    Acute on chronic combined systolic and diastolic CHF (congestive heart failure) ([facility] )    Wt Readings from Last 3 Encounters:   06/07/22 48 6 kg (107 lb 3 2 oz)   05/16/22 47 6 kg (105 lb)   03/25/22 58 5 kg (129 lb)                   Fall    Permanent atrial fibrillation ([facility] )    Hyperlipidemia    Hypertension    Coronary artery disease    CAMPBELL (acute kidney injury) ([facility] )    Bladder mass    Hematoma of left lower extremity    COVID-19 virus infection              Past Medical History:   Diagnosis Date    Anemia, unspecified     Basal cell carcinoma of overlapping sites of skin     Bronchial pneumonia     Cellulitis of left lower limb     Chronic venous hypertension     Conductive hearing loss, bilateral     Diverticulosis of large intestine without perforation or abscess without bleeding     Edema     Heart failure, unspecified (HCC)     Low back pain     Other abnormalities of gait and mobility     Other lack of coordination     Other specified arthritis, other site     Pain in left knee     Pleural effusion in other conditions classified elsewhere     Presence of cardiac pacemaker     Repeated falls     Unspecified abnormalities of gait and mobility     Unspecified atrial fibrillation (HCC)      Social History     Socioeconomic History    Marital status:      Spouse name: Not on file    Number of children: Not on file    Years of education: Not on file    Highest education level: Not on file   Occupational History    Not on file   Tobacco Use    Smoking status: Never Smoker    Smokeless tobacco: Never Used   Vaping Use    Vaping Use: Never used   Substance and Sexual Activity    Alcohol use: Not Currently    Drug use: Not Currently    Sexual activity: Not on file   Other Topics Concern    Not on file   Social History Narrative    Not on file     Social Determinants of Health     Financial Resource Strain: Not on file   Food Insecurity: No Food Insecurity    Worried About [email or web address] in the Last Year: Never true    Sylvia of Food in the Last Year: Never true   Transportation Needs: No Transportation Needs    Lack of Transportation (Medical): No    Lack of Transportation (Non-Medical): No   Physical Activity: Not on file   Stress: Not on file   Social Connections: Not on file   Intimate Partner Violence: Not on file   Housing Stability: Low Risk     Unable to Pay for Housing in the Last Year: No    Number of Places Lived in the Last Year: 1    Unstable Housing in the Last Year: No      No family history on file  Past Surgical History:   Procedure Laterality Date    CARDIAC ELECTROPHYSIOLOGY PROCEDURE N/A 3/10/2022    Procedure: Cardiac pacer generator change- Single Chamber;  Surgeon: Abdi Benites MD;  Location: [facility];   Service: Cardiology       Current Outpatient Medications:     acetaminophen (TYLENOL) 325 mg tablet, Take by mouth every 6 (six) hours as needed  , Disp: , Rfl:     benzonatate (TESSALON PERLES) 100 mg capsule, Take 1 capsule (100 mg total) by mouth as needed in the morning and 1 capsule (100 mg total) as needed at noon and 1 capsule (100 mg total) as needed in the evening for cough  , Disp: 20 capsule, Rfl: 0    clotrimazole (LOTRIMIN) 1 % cream, Apply topically daily as needed, Disp: , Rfl:     cyanocobalamin (VITAMIN B-12) 500 mcg tablet, Take by mouth, Disp: , Rfl:     dextran 70-hypromellose (GENTEAL TEARS) 0 1-0 3 % ophthalmic solution, Apply to eye, Disp: , Rfl:     Emollient (CERAVE) CREA, Apply topically, Disp: , Rfl:     ferrous sulfate 325 (65 Fe) mg tablet, Take 325 mg by mouth daily with breakfast, Disp: , Rfl:     fluticasone (FLONASE) 50 mcg/act nasal spray, 1 spray into each nostril daily  , Disp: , Rfl:     guaiFENesin (ROBITUSSIN) 100 MG/5ML oral liquid, Take 200 mg by mouth 3 (three) times a day as needed for cough  , Disp: , Rfl:     loperamide (IMODIUM) 2 mg capsule, Take 2 mg by mouth 3 (three) times a day as needed  , Disp: , Rfl:     metolazone (ZAROXOLYN) 2 5 mg tablet, Take 1 tablet (2 5 mg total) by mouth if needed (a half-hour prior to diuretic as directed), Disp: 30 tablet, Rfl: 0    metoprolol succinate (TOPROL-XL) 50 mg 24 hr tablet, Take 0 5 tablets (25 mg total) by mouth in the morning , Disp: , Rfl: 0    Mtlny-Ugqni-Xloqypb-Pramoxine 1 % OINT, Apply topically, Disp: , Rfl:     pantoprazole (PROTONIX) 40 mg tablet, Take 1 tablet by mouth daily, Disp: , Rfl:     polyethylene glycol (GLYCOLAX) 17 GM/SCOOP powder, Take 17 g by mouth daily, Disp: , Rfl:     potassium chloride (K-DUR,KLOR-CON) 20 mEq tablet, Take 1 tablet by mouth every 12 (twelve) hours, Disp: , Rfl:     saccharomyces boulardii (FLORASTOR) 250 mg capsule, Take 250 mg by mouth 2 (two) times a day, Disp: , Rfl:     Saline 0 65 % SOLN, into each nostril, Disp: , Rfl:     Skin Protectants, Misc   (CALAZIME SKIN PROTECTANT) PSTE, Apply topically, Disp: , Rfl:     Tetrahydroz-Dextran-PEG-Povid (EYE DROPS ADVANCED RELIEF OP), Apply 1 drop to eye daily as needed, Disp: , Rfl:     Torsemide 40 MG TABS, Take 40 mg by mouth 2 (two) times a day, Disp: 60 tablet, Rfl: 5    warfarin (COUMADIN) 2 5 mg tablet, Take 1 tablet (2 5 mg total) by mouth in the evening  Daily every day except take 5mg on Monday , Disp: , Rfl: 0    warfarin (COUMADIN) 5 mg tablet, Take 5 mg by mouth, Disp: , Rfl:   Allergies   Allergen Reactions    Meperidine      Demerol     Xarelto [Rivaroxaban]      Internal bleeding         Labs:     Chemistry        Component Value Date/Time     04/26/2014 0636    K 3 6 05/13/2022 0537    K 3 5 04/26/2014 0636     05/13/2022 0537     (H) 04/26/2014 0636    CO2 27 05/13/2022 0537    CO2 25 04/26/2014 0636    BUN 38 (H) 05/13/2022 0537    BUN 42 (H) 04/26/2014 0636    CREATININE 1 80 (H) 05/13/2022 0537    CREATININE 1 04 04/26/2014 0636        Component Value Date/Time    CALCIUM 8 2 (L) 05/13/2022 0537    CALCIUM 8 4 04/26/2014 0636    ALKPHOS 103 04/21/2014 0510    AST 61 (H) 04/21/2014 0510    ALT 40 04/21/2014 0510    BILITOT 0 78 04/21/2014 0510            Lab Results   Component Value Date    CHOL 192 11/15/2013     Lab Results   Component Value Date    HDL 70 11/15/2013     Lab Results   Component Value Date    LDLCALC 108 11/15/2013     Lab Results   Component Value Date    TRIG 69 11/15/2013     No results found for: CHOLHDL    Imaging: US kidney and bladder    Result Date: 5/12/2022  Narrative: RENAL ULTRASOUND INDICATION:   ? bladder mass seen on CT lower ext  COMPARISON: CT left lower extremity 5/7/2022 TECHNIQUE:   Ultrasound of the retroperitoneum was performed with a curvilinear transducer utilizing volumetric sweeps and still imaging techniques  FINDINGS: Limited study due to patient's clinical condition  KIDNEYS: Right kidney Markedly limited visualization  No hydronephrosis  Left kidney Unable to be imaged due to overlying bowel gas  URETERS: Nonvisualized  BLADDER: Normally distended  Polypoid echogenic mass within the bladder along the posterior wall measuring 2 1 x 1 6 x 2 6 cm    No detectable vascularity on color Doppler  Bilateral ureteral jets not detected  Impression: 1  Polypoid echogenic bladder mass concerning for neoplasm, correlating with prior CT finding  2   Left kidney was unable to be visualized  Limited evaluation of the right kidney shows no hydronephrosis  Workstation performed: [id number]     Echo complete w/ contrast if indicated    Result Date: 5/9/2022  Narrative: Emelina Amado  Left Ventricle: Wall thickness is mildly increased  The left ventricular ejection fraction is 55%  Systolic function is normal  Wall motion is normal    Right Ventricle: Right ventricular cavity size is severely dilated  Systolic function is moderately reduced    Left Atrium: The atrium is moderately dilated    Right Atrium: The atrium is severely dilated    Aortic Valve: The aortic valve is trileaflet  The leaflets are not thickened  The leaflets are moderately calcified  There is moderately reduced mobility  There is likely moderate stenosis  The aortic valve velocity is increased due to stenosis but lower than expected due to the presence of decreased flow    Mitral Valve: There is moderate annular calcification  There is mild to moderate regurgitation    Tricuspid Valve: There is severe regurgitation    Pericardium: There is a small left pleural effusion  Review of Systems   Constitutional: Negative  HENT: Negative  Eyes: Negative  Cardiovascular: Positive for leg swelling  Respiratory: Positive for shortness of breath  Endocrine: Negative  Hematologic/Lymphatic: Negative  Skin: Negative  Musculoskeletal: Negative  Gastrointestinal: Negative  Genitourinary: Negative  Neurological: Negative  Psychiatric/Behavioral: Negative  Allergic/Immunologic: Negative  Vitals:    06/07/22 1432   BP: 118/62   Pulse: 71           Physical Exam  Vitals and nursing note reviewed  Constitutional:       Appearance: Normal appearance  HENT:      Head: Normocephalic        Nose: Nose normal       Mouth/Throat:      Mouth: Mucous membranes are moist    Eyes:      General: No scleral icterus  Conjunctiva/sclera: Conjunctivae normal    Cardiovascular:      Rate and Rhythm: Normal rate  Rhythm irregular  Heart sounds: No murmur heard  No gallop  Pulmonary:      Effort: Pulmonary effort is normal  No respiratory distress  Breath sounds: Normal breath sounds  No wheezing or rales  Abdominal:      General: Abdomen is flat  Bowel sounds are normal  There is no distension  Palpations: Abdomen is soft  Tenderness: There is no abdominal tenderness  There is no guarding  Musculoskeletal:      Cervical back: Normal range of motion and neck supple  Right lower leg: Edema present  Left lower leg: Edema present  Skin:     General: Skin is warm and dry  Neurological:      General: No focal deficit present  Mental Status: She is alert and oriented to person, place, and time  Psychiatric:         Mood and Affect: Mood normal          Behavior: Behavior normal          Discussion/Summary:    Chronic Diastolic CHF: Dry weight 499 pounds  She has bilateral le edema  We will take metolazone 2 5 mg every Wednesday prior to AM torsemide with extra 10 eq KCL those days  Perm  Atrial Fibrillation: Continue rate control and AC with coumadin  S/P PPM implant  The patient was counseled regarding diagnostic results, instructions for management, risk factor reductions, impressions  total time of encounter was 25 minutes and 15 minutes was spent counseling

## 2022-06-10 ENCOUNTER — ANTICOAG VISIT (OUTPATIENT)
Dept: CARDIOLOGY CLINIC | Facility: CLINIC | Age: 87
End: 2022-06-10

## 2022-06-10 LAB — INR PPP: 2.9 (ref 0.84–1.19)

## 2022-06-10 NOTE — PROGRESS NOTES
Spoke with Denise at [facility], advised INR good, will continue 1 25 mg Tues Thurs, 2 5 mg all other days, will recheck in 2 weeks 6/23/22    Physician order faxed

## 2022-06-23 ENCOUNTER — ANTICOAG VISIT (OUTPATIENT)
Dept: CARDIOLOGY CLINIC | Facility: CLINIC | Age: 87
End: 2022-06-23

## 2022-06-23 LAB — INR PPP: 2.1 (ref 0.84–1.19)

## 2022-06-23 NOTE — PROGRESS NOTES
Left message for [facility], advised INR good, continue 1 25 mg Tues Thurs, 2 5 mg all other days   Will recheck next week 6/30/22    Physician order faxed

## 2022-06-29 ENCOUNTER — TELEPHONE (OUTPATIENT)
Dept: CARDIOLOGY CLINIC | Facility: CLINIC | Age: 87
End: 2022-06-29

## 2022-06-29 ENCOUNTER — REMOTE DEVICE CLINIC VISIT (OUTPATIENT)
Dept: CARDIOLOGY CLINIC | Facility: CLINIC | Age: 87
End: 2022-06-29
Payer: MEDICARE

## 2022-06-29 DIAGNOSIS — Z95.0 PRESENCE OF PERMANENT CARDIAC PACEMAKER: Primary | ICD-10-CM

## 2022-06-29 PROCEDURE — 93294 REM INTERROG EVL PM/LDLS PM: CPT | Performed by: INTERNAL MEDICINE

## 2022-06-29 PROCEDURE — 93296 REM INTERROG EVL PM/IDS: CPT | Performed by: INTERNAL MEDICINE

## 2022-06-29 RX ORDER — TORSEMIDE 20 MG/1
60 TABLET ORAL 2 TIMES DAILY
COMMUNITY

## 2022-06-29 NOTE — PROGRESS NOTES
BSC DUAL PPM (VVIR)/ NOT MRI CONDITIONAL   LATITUDE TRANSMISSION:  BATTERY VOLTAGE ADEQUATE (9 YR )    78% (>40%/CHB, VVIR 60 PPM)    ALL LEAD PARAMETERS WITHIN NORMAL LIMITS   NO SIGNIFICANT HIGH RATE EPISODES   NORMAL DEVICE FUNCTION  Fernando Meza

## 2022-06-29 NOTE — TELEPHONE ENCOUNTER
Received a call from [address], who is caring for [facility]  Bradley Gardner would like to speak to you, as the patient has requested, to discuss her meds and care  She said Jenny Vanegas has plus 4 edema and can't get her shoes on,  doesn't like to take weekly metolazone  The nurse Bradley Gardner believes the Torsemide dose is not effective  She would like your input on managing diuretics and anticoagulant now that she is on hospice care  She asked if she should remain on coumadin  The nurse asked specifically to speak to you regarding these issues as the patient stated that you know her best and asked for your direction on these issues even though on hospice       Teresita's phone #  [phone]

## 2022-07-20 ENCOUNTER — ANTICOAG VISIT (OUTPATIENT)
Dept: CARDIOLOGY CLINIC | Facility: CLINIC | Age: 87
End: 2022-07-20

## 2022-09-28 ENCOUNTER — REMOTE DEVICE CLINIC VISIT (OUTPATIENT)
Dept: CARDIOLOGY CLINIC | Facility: CLINIC | Age: 87
End: 2022-09-28
Payer: MEDICARE

## 2022-09-28 DIAGNOSIS — Z95.0 PRESENCE OF PERMANENT CARDIAC PACEMAKER: Primary | ICD-10-CM

## 2022-09-28 PROCEDURE — 93294 REM INTERROG EVL PM/LDLS PM: CPT | Performed by: INTERNAL MEDICINE

## 2022-09-28 PROCEDURE — 93296 REM INTERROG EVL PM/IDS: CPT | Performed by: INTERNAL MEDICINE

## 2022-09-28 NOTE — PROGRESS NOTES
Results for orders placed or performed in visit on 09/28/22   Cardiac EP device report    Narrative    BSC DUAL PPM (VVIR)/ NOT MRI CONDITIONAL  LATITUDE TRANSMISSION: BATTERY VOLTAGE ADEQUATE  (8 5 YRS)   59%  ALL AVAILABLE LEAD PARAMETERS WITHIN NORMAL LIMITS  NO SIGNIFICANT HIGH RATE EPISODES  NORMAL DEVICE FUNCTION  ---LADD

## 2022-12-28 ENCOUNTER — REMOTE DEVICE CLINIC VISIT (OUTPATIENT)
Dept: CARDIOLOGY CLINIC | Facility: CLINIC | Age: 87
End: 2022-12-28

## 2022-12-28 DIAGNOSIS — Z95.0 CARDIAC PACEMAKER IN SITU: Primary | ICD-10-CM

## 2022-12-29 NOTE — PROGRESS NOTES
Results for orders placed or performed in visit on 12/28/22   Cardiac EP device report    Narrative    BSC DUAL PPM (VVIR)/ NOT MRI CONDITIONAL  LATITUDE TRANSMISSION: BATTERY VOLTAGE ADEQUATE (8 5 YRS)  -54% (>40% [email or web address])  ALL AVAILABLE LEAD PARAMETERS WITHIN NORMAL LIMITS  NO SIGNIFICANT HIGH RATE EPISODES  NORMAL DEVICE FUNCTION   GV

## 2023-11-04 NOTE — PROGRESS NOTES
Tc to nurse at [facility], [address], will continue current dose, inr due 4 weeks  Nurse reports patient has no c/o bleeding   Doing well  - Currently on IABP 1:1  - Inotrope: N/A  - IV nitroprusside 0.5mcg/kg/min  - Patient had an IABP wean to 1:3 overnight with SvO2 increase to 83.8, subsequently had a fever to 38.3C  - Would recommend keeping IABP in for at least 1 more day for hemodynamic support due to vasodilatory component from sepsis  - PRN Diuresis CVP goal <10  - Trend perfusion markers daily, MVO2, BMP, Lactate and LFT's  - Strict I/O's. - Currently on IABP 1:1  - Inotrope: N/A  - IV nitroprusside 0.5mcg/kg/min  - Start aldactone 25mg daily  - Patient had an IABP wean to 1:3 overnight with SvO2 increase to 83.8, subsequently had a fever to 38.3C  - Would recommend keeping IABP in for at least 1 more day for hemodynamic support due to vasodilatory component from sepsis  - PRN Diuresis CVP goal <10  - Trend perfusion markers daily, MVO2, BMP, Lactate and LFT's  - Strict I/O's.